# Patient Record
Sex: MALE | Race: WHITE
[De-identification: names, ages, dates, MRNs, and addresses within clinical notes are randomized per-mention and may not be internally consistent; named-entity substitution may affect disease eponyms.]

---

## 2017-09-25 ENCOUNTER — HOSPITAL ENCOUNTER (OUTPATIENT)
Dept: HOSPITAL 53 - M LAB REF | Age: 67
End: 2017-09-25
Attending: INTERNAL MEDICINE
Payer: MEDICARE

## 2017-09-25 DIAGNOSIS — R19.7: Primary | ICD-10-CM

## 2017-10-31 ENCOUNTER — HOSPITAL ENCOUNTER (EMERGENCY)
Dept: HOSPITAL 53 - M ED | Age: 67
Discharge: HOME | End: 2017-10-31
Payer: MEDICARE

## 2017-10-31 VITALS — DIASTOLIC BLOOD PRESSURE: 56 MMHG | SYSTOLIC BLOOD PRESSURE: 105 MMHG

## 2017-10-31 VITALS — BODY MASS INDEX: 37.94 KG/M2 | HEIGHT: 70 IN | WEIGHT: 265 LBS

## 2017-10-31 DIAGNOSIS — E78.4: ICD-10-CM

## 2017-10-31 DIAGNOSIS — I25.2: ICD-10-CM

## 2017-10-31 DIAGNOSIS — T81.4XXA: Primary | ICD-10-CM

## 2017-10-31 DIAGNOSIS — Z86.19: ICD-10-CM

## 2017-10-31 DIAGNOSIS — Z72.0: ICD-10-CM

## 2017-10-31 LAB
ANION GAP SERPL CALC-SCNC: 5 MEQ/L (ref 8–16)
BASOPHILS # BLD AUTO: 0.1 10^3/UL (ref 0–0.2)
BASOPHILS NFR BLD AUTO: 0.8 % (ref 0–1)
BUN SERPL-MCNC: 23 MG/DL (ref 7–18)
CALCIUM SERPL-MCNC: 9.2 MG/DL (ref 8.8–10.2)
CHLORIDE SERPL-SCNC: 106 MEQ/L (ref 98–107)
CO2 SERPL-SCNC: 27 MEQ/L (ref 21–32)
CREAT SERPL-MCNC: 1.7 MG/DL (ref 0.7–1.3)
EOSINOPHIL # BLD AUTO: 0.6 10^3/UL (ref 0–0.5)
EOSINOPHIL NFR BLD AUTO: 7.2 % (ref 0–3)
ERYTHROCYTE [DISTWIDTH] IN BLOOD BY AUTOMATED COUNT: 15 % (ref 11.5–14.5)
GFR SERPL CREATININE-BSD FRML MDRD: 43 ML/MIN/{1.73_M2} (ref 49–?)
GLUCOSE SERPL-MCNC: 148 MG/DL (ref 80–110)
IMM GRANULOCYTES NFR BLD: 0.8 % (ref 0–0)
LYMPHOCYTES # BLD AUTO: 1.7 10^3/UL (ref 1.5–4.5)
LYMPHOCYTES NFR BLD AUTO: 18.4 % (ref 24–44)
MCH RBC QN AUTO: 28.8 PG (ref 27–33)
MCHC RBC AUTO-ENTMCNC: 32.8 G/DL (ref 32–36.5)
MCV RBC AUTO: 87.9 FL (ref 80–96)
MONOCYTES # BLD AUTO: 0.4 10^3/UL (ref 0–0.8)
MONOCYTES NFR BLD AUTO: 4.2 % (ref 0–5)
NEUTROPHILS # BLD AUTO: 6.1 10^3/UL (ref 1.8–7.7)
NEUTROPHILS NFR BLD AUTO: 68.6 % (ref 36–66)
NRBC BLD AUTO-RTO: 0 % (ref 0–0)
PLATELET # BLD AUTO: 219 10^3/UL (ref 150–450)
POTASSIUM SERPL-SCNC: 4.3 MEQ/L (ref 3.5–5.1)
SODIUM SERPL-SCNC: 138 MEQ/L (ref 136–145)
WBC # BLD AUTO: 9 10^3/UL (ref 4–10)

## 2017-10-31 NOTE — REP
Abdominal wall ultrasound:

 

The patient states she had a midline abdominal surgery in September 2017.

 

Scanning of the anterior abdominal wall identifies a few focal fluid collection

approximate 2 cm deep to the skin surface.  The collection measures 4.1 x 0.8 x

1.9 cm and contains internal echoes therefore is a complex fluid collection.

This is compatible with abscess or hematoma or seroma.  . There appears to be a

tract pointing to the skin surface from this fluid collection.

 

Interposed between this collection and the skin surface approximately 0.7 cm deep

to the skin surface.  There is a second hypoechoic collection measuring 2.3 x 1.3

x 1.2 cm, compatible with a second abscess versus hematoma versus phlegmon.

 

 

Signed by

Jackson Carnes MD 10/31/2017 03:27 P

## 2017-11-20 ENCOUNTER — HOSPITAL ENCOUNTER (INPATIENT)
Dept: HOSPITAL 53 - M MSPAV | Age: 67
LOS: 2 days | Discharge: HOME HEALTH | DRG: 372 | End: 2017-11-22
Attending: INTERNAL MEDICINE | Admitting: INTERNAL MEDICINE
Payer: MEDICARE

## 2017-11-20 VITALS — WEIGHT: 269.4 LBS | HEIGHT: 70 IN | BODY MASS INDEX: 38.57 KG/M2

## 2017-11-20 VITALS — DIASTOLIC BLOOD PRESSURE: 63 MMHG | SYSTOLIC BLOOD PRESSURE: 121 MMHG

## 2017-11-20 DIAGNOSIS — F32.9: ICD-10-CM

## 2017-11-20 DIAGNOSIS — Z88.8: ICD-10-CM

## 2017-11-20 DIAGNOSIS — Z88.0: ICD-10-CM

## 2017-11-20 DIAGNOSIS — E11.9: ICD-10-CM

## 2017-11-20 DIAGNOSIS — G20: ICD-10-CM

## 2017-11-20 DIAGNOSIS — Z79.84: ICD-10-CM

## 2017-11-20 DIAGNOSIS — G47.33: ICD-10-CM

## 2017-11-20 DIAGNOSIS — L02.211: ICD-10-CM

## 2017-11-20 DIAGNOSIS — N40.0: ICD-10-CM

## 2017-11-20 DIAGNOSIS — I27.20: ICD-10-CM

## 2017-11-20 DIAGNOSIS — M19.90: ICD-10-CM

## 2017-11-20 DIAGNOSIS — Z79.82: ICD-10-CM

## 2017-11-20 DIAGNOSIS — F17.210: ICD-10-CM

## 2017-11-20 DIAGNOSIS — E78.5: ICD-10-CM

## 2017-11-20 DIAGNOSIS — G47.00: ICD-10-CM

## 2017-11-20 DIAGNOSIS — I25.10: ICD-10-CM

## 2017-11-20 DIAGNOSIS — J44.9: ICD-10-CM

## 2017-11-20 DIAGNOSIS — I50.32: ICD-10-CM

## 2017-11-20 DIAGNOSIS — F41.9: ICD-10-CM

## 2017-11-20 DIAGNOSIS — B95.62: ICD-10-CM

## 2017-11-20 DIAGNOSIS — I35.0: ICD-10-CM

## 2017-11-20 DIAGNOSIS — Z96.643: ICD-10-CM

## 2017-11-20 DIAGNOSIS — J30.9: ICD-10-CM

## 2017-11-20 DIAGNOSIS — K21.9: ICD-10-CM

## 2017-11-20 DIAGNOSIS — Z79.899: ICD-10-CM

## 2017-11-20 DIAGNOSIS — E55.9: ICD-10-CM

## 2017-11-20 DIAGNOSIS — I11.0: ICD-10-CM

## 2017-11-20 DIAGNOSIS — A04.72: Primary | ICD-10-CM

## 2017-11-20 LAB
ALBUMIN SERPL BCG-MCNC: 3.8 GM/DL (ref 3.2–5.2)
ALBUMIN/GLOB SERPL: 1.03 {RATIO} (ref 1–1.93)
ALP SERPL-CCNC: 50 U/L (ref 45–117)
ALT SERPL W P-5'-P-CCNC: 13 U/L (ref 12–78)
ANION GAP SERPL CALC-SCNC: 8 MEQ/L (ref 8–16)
AST SERPL-CCNC: 15 U/L (ref 7–37)
BASOPHILS # BLD AUTO: 0.1 10^3/UL (ref 0–0.2)
BASOPHILS NFR BLD AUTO: 1.1 % (ref 0–1)
BILIRUB SERPL-MCNC: 0.3 MG/DL (ref 0.2–1)
BUN SERPL-MCNC: 25 MG/DL (ref 7–18)
CALCIUM SERPL-MCNC: 9.3 MG/DL (ref 8.8–10.2)
CHLORIDE SERPL-SCNC: 109 MEQ/L (ref 98–107)
CO2 SERPL-SCNC: 23 MEQ/L (ref 21–32)
CREAT SERPL-MCNC: 1.77 MG/DL (ref 0.7–1.3)
EOSINOPHIL # BLD AUTO: 0.7 10^3/UL (ref 0–0.5)
EOSINOPHIL NFR BLD AUTO: 7.5 % (ref 0–3)
ERYTHROCYTE [DISTWIDTH] IN BLOOD BY AUTOMATED COUNT: 14.2 % (ref 11.5–14.5)
ERYTHROCYTE [SEDIMENTATION RATE] IN BLOOD BY WESTERGREN METHOD: 40 MM/HR (ref 0–20)
GFR SERPL CREATININE-BSD FRML MDRD: 41.1 ML/MIN/{1.73_M2} (ref 49–?)
GLUCOSE SERPL-MCNC: 107 MG/DL (ref 80–110)
IMM GRANULOCYTES NFR BLD: 0.7 % (ref 0–0)
INR PPP: 1.06
LYMPHOCYTES # BLD AUTO: 2.1 10^3/UL (ref 1.5–4.5)
LYMPHOCYTES NFR BLD AUTO: 22.6 % (ref 24–44)
MCH RBC QN AUTO: 27.8 PG (ref 27–33)
MCHC RBC AUTO-ENTMCNC: 32 G/DL (ref 32–36.5)
MCV RBC AUTO: 87 FL (ref 80–96)
MONOCYTES # BLD AUTO: 0.5 10^3/UL (ref 0–0.8)
MONOCYTES NFR BLD AUTO: 4.9 % (ref 0–5)
NEUTROPHILS # BLD AUTO: 5.8 10^3/UL (ref 1.8–7.7)
NEUTROPHILS NFR BLD AUTO: 63.2 % (ref 36–66)
NRBC BLD AUTO-RTO: 0 % (ref 0–0)
PLATELET # BLD AUTO: 335 10^3/UL (ref 150–450)
POTASSIUM SERPL-SCNC: 5.1 MEQ/L (ref 3.5–5.1)
PROT SERPL-MCNC: 7.5 GM/DL (ref 6.4–8.2)
SODIUM SERPL-SCNC: 140 MEQ/L (ref 136–145)
WBC # BLD AUTO: 9.1 10^3/UL (ref 4–10)

## 2017-11-20 PROCEDURE — 02HV33Z INSERTION OF INFUSION DEVICE INTO SUPERIOR VENA CAVA, PERCUTANEOUS APPROACH: ICD-10-PCS | Performed by: RADIOLOGY

## 2017-11-20 RX ADMIN — INSULIN LISPRO SCH UNITS: 100 INJECTION, SOLUTION INTRAVENOUS; SUBCUTANEOUS at 18:58

## 2017-11-20 RX ADMIN — TAMSULOSIN HYDROCHLORIDE SCH MG: 0.4 CAPSULE ORAL at 22:12

## 2017-11-20 RX ADMIN — CARBIDOPA AND LEVODOPA SCH TAB: 25; 100 TABLET ORAL at 22:12

## 2017-11-20 RX ADMIN — MONTELUKAST SODIUM SCH MG: 10 TABLET, FILM COATED ORAL at 22:15

## 2017-11-20 RX ADMIN — Medication SCH UNITS: at 22:12

## 2017-11-20 RX ADMIN — PANTOPRAZOLE SODIUM SCH MG: 40 TABLET, DELAYED RELEASE ORAL at 19:04

## 2017-11-20 RX ADMIN — Medication SCH UNITS: at 18:59

## 2017-11-20 RX ADMIN — DEXTROSE MONOHYDRATE SCH MLS/HR: 50 INJECTION, SOLUTION INTRAVENOUS at 18:58

## 2017-11-20 RX ADMIN — SODIUM CHLORIDE SCH ML: 9 INJECTION, SOLUTION INTRAMUSCULAR; INTRAVENOUS; SUBCUTANEOUS at 18:59

## 2017-11-20 RX ADMIN — PROBIOTIC PRODUCT - TAB SCH EA: TAB at 18:57

## 2017-11-20 RX ADMIN — LISINOPRIL SCH MG: 10 TABLET ORAL at 22:15

## 2017-11-20 RX ADMIN — SITAGLIPTIN SCH MG: 50 TABLET, FILM COATED ORAL at 18:57

## 2017-11-20 RX ADMIN — INSULIN LISPRO SCH UNITS: 100 INJECTION, SOLUTION INTRAVENOUS; SUBCUTANEOUS at 20:10

## 2017-11-20 RX ADMIN — ESCITALOPRAM OXALATE SCH MG: 10 TABLET, FILM COATED ORAL at 22:11

## 2017-11-20 RX ADMIN — BUDESONIDE AND FORMOTEROL FUMARATE DIHYDRATE SCH PUFF: 160; 4.5 AEROSOL RESPIRATORY (INHALATION) at 20:46

## 2017-11-20 RX ADMIN — SODIUM CHLORIDE SCH UNITS: 4.5 INJECTION, SOLUTION INTRAVENOUS at 18:46

## 2017-11-20 RX ADMIN — SODIUM CHLORIDE SCH UNITS: 4.5 INJECTION, SOLUTION INTRAVENOUS at 22:17

## 2017-11-20 RX ADMIN — ROSUVASTATIN CALCIUM SCH MG: 10 TABLET, FILM COATED ORAL at 22:11

## 2017-11-20 RX ADMIN — DUTASTERIDE SCH MG: 0.5 CAPSULE, LIQUID FILLED ORAL at 22:16

## 2017-11-20 RX ADMIN — FIDAXOMICIN SCH MG: 200 TABLET, FILM COATED ORAL at 22:16

## 2017-11-20 NOTE — HPEPDOC
General


Date of Admission


2017 at 15:04


Other Providers


Infectious disease:  Dr. UMM Mendoza


Attending Physician:  NICK AUSTIN MD


Chief Complaint


The patient is a 67-year-old male admitted with a reason for visit of Mrsa,Abd 

Wound,Cdiff.


Source:  Patient, Family, RN notes reviewed, Old records


Exam Limitations:  No limitations





History of Present Illness


Mr. Singleton is a 67-year-old  male who presents to [facility] as a direct admission from the infectious disease specialist's office.





Past medical history is significant for:  MRSA of the abdomen, Clostridium 

difficile infection, dyslipidemia, benign prostatic hyperplasia, insomnia, 

degenerative disc disease, small sliding esophageal hiatal hernia, 

gastroesophageal reflux disease, diastolic congestive heart failure, pulmonary 

hypertension, mild aortic stensosis without insufficiency, obstructive sleep 

apnea, diabetes mellitus, chronic obstructive pulmonary disease, Parkinson's 

disease, depression/anxiety, hypertension, history of "silent" myocardial 

infarction.





Patient had presented to the infectious disease specialist's office for 

evaluation of his C. diff infection and abdominal abscess.  Initially presented 

to [facility] with fever on 17.  Transferred to [email or web address] in [place] 

where he received IV antibiotics for 48 hours and an I&D was performed.  

Discharged from [facility] [[email or web address]] on 17.  Continued with Ciprofloxacin and 

Metronidazole for two weeks post-discharge.  Patient admitted to the 

development of a rash with associated pruritis of his arms and abdomen, but 

unsure of its relation to antibiotic use.  Almost a month later on 17 

patient developed diarrhea and was positive for Clostridium difficile.  Treated 

with Vancomycin 250mg orally three times a day for ten days.  Has had a total 

of three cycles of antibiotic therapy with the most recent on 10/30/17.  

Patient then presented to the Emergency Department on 10/31/17 and an abdominal 

ultrasound was done which showed a 4x2 cm complex fluid collection compatible 

with an abscess with a tract pointing to the skin surface.  Would culture was 

performed and was positive for MRSA and Klebsiella oxytoca.  On 17 he was 

given Bactrim DS orally twice day, but subsequently developed an increase in 

his creatinine.  Patient was then switched to Doxycycline 100mg orally twice a 

day which he is still on.  Patient did have an appointment scheduled with 

general surgery for evaluation of his abdominal abscess.  Patient's wife states 

that incision is quite erythematous.  Patient continues to have diarrhea with a 

20 pound weight loss over the last two months.  Admits to shortness of breath 

with exertion and the occasional numbness and tingling in his extremities, but 

no other review of systems was positive during my evaluation at bedside.





Hospitalist service was consulted for further evaluation and management.





Home Medications


Scheduled


 (Trilipix) 135 Mg Cap, 135 MG PO QPM, (Reported)


 (Karen-Bid Probiotic) 1 Tab Tab, 2 EA PO WM


Aspirin (Aspirin 81) 81 Mg Tab, 81 MG PO QPM, (Reported)


Budesonide/Formoterol (Symbicort 160-4.5 Mcg/Act) 60 Puff/Inhaler Aers, 2 PUFF 

INH BID, (Reported)


Bupropion HCl (Wellbutrin Sr) 150 Mg Tabcr, 150 MG PO DAILY, (Reported)


Carbidopa/Levodopa (Carbidopa/Levodopa  mg) 1 Tab Tab, 1 TAB PO TID, (

Reported)


Cetirizine HCl (Cetirizine HCl) 10 Mg Tab, 10 MG PO QPM, (Reported)


Cholecalciferol (Vitamin D3) 2,000 Unit Tab, 2,000 UNIT PO QPM, (Reported)


Dutasteride (Avodart) 0.5 Mg Cap, 0.5 MG PO QPM, (Reported)


Escitalopram Oxalate (Escitalopram Oxalate) 20 Mg Tab, 20 MG PO QPM, (Reported)


Fidaxomicin (Dificid) 200 Mg Tab, 200 MG PO BID


Glimepiride (Glimepiride) 4 Mg Tab, 4 MG PO QPM, (Reported)


Lisinopril (Lisinopril) 10 Mg Tab, 10 MG PO QPM, (Reported)


Montelukast Sodium (Montelukast Sodium) 10 Mg Tab, 10 MG PO QPM, (Reported)


Omeprazole (Omeprazole) 20 Mg Cap, 20 MG PO QPM, (Reported)


Potassium Chloride (Klor-Con M10) 10 Meq Tabcr, 10 MEQ PO QPM, (Reported)


Roflumilast (Daliresp) 500 Mcg Tab, 500 MCG PO QPM, (Reported)


Rosuvastatin Calcium (Crestor) 20 Mg Tab, 20 MG PO QPM, (Reported)


Sitagliptin Phosphate (Januvia) 100 Mg Tab, 100 MG PO QPM, (Reported)


Tamsulosin Hydrochloride (Flomax) 0.4 Mg Cap, 0.4 MG PO QPM, (Reported)


Zolpidem Tartrate (Zolpidem Tartrate) 10 Mg Tab, 10 MG PO QHS, (Reported)





Scheduled PRN


Albuterol Sulfate (Proventil Hfa) 108 Mcg/Act Aer, 2 MCG INH QID PRN for 

SHORTNESS OF BREATH, (Reported)


Fluticasone Propionate (Flovent Hfa) Unknown Strength Aer, Unknown Dose INH BID 

PRN for SHORTNESS OF BREATH, (Reported)


Levalbuterol Hydrochloride (Xopenex Concentrate) 1.25 Mg/0.5 Ml Neb, 1.25 MG 

INH Q4H PRN for SHORTNESS OF BREATH, (Reported)


Lidocaine (Lidoderm) 5 % Dis, 1 PATCH TD DAILY PRN for BACK PAIN, (Reported)


Prednisone (Prednisone) 10 Mg Tab, 10 MG PO DAILY PRN for ASTHMA, (Reported)


Quetiapine Fumerate (Seroquel) 50 Mg Tab, 50 MG PO BID PRN for ANXIETY, (

Reported)





Allergies


Coded Allergies:  


     Metformin (Unverified  Allergy, Unknown, 6/15/16)


     Niacin (Unverified  Allergy, Unknown, 6/15/16)


     Penicillins (Verified  Allergy, Unknown, 13)


     Penicillins Cross Reactors (Verified  Allergy, Unknown, 13)


     Insulin Degludec (Unverified  Adverse Reaction, Intermediate, EXTREMELY 

LOW BLOOD SUGAR- JUST TRULICITY, 17)





Past Medical History


Medical History


1. MRSA of the abdomen with abscess


2. Clostridium difficile infection


3. Dyslipidemia


4. Benign prostatic hyperplasia


5. Insomnia


6. Degenerative disc disease


7. Small sliding esophageal hiatal hernia


8. Gastroesophageal reflux disease


9. Diastolic congestive heart failure


10. Pulmonary hypertension


11. Mild aortic stensosis without insufficiency


12. Obstructive sleep apnea


13. Diabetes mellitus


14. Chronic obstructive pulmonary disease


15. Parkinson's disease


16. Depression/anxiety


17. Hypertension


18. History of "silent" myocardial infarction.


Surgical History


1. Colonoscopy


2. Palate lesion removal


3. EGD


4. Bilateral hip replacements


5. Cardiac stress test x2


6. Incision and drainage of abdominal abscess





Family History


Father:  , 86, lung disease


Mother:  , 68, myocardial infarction


Brother:  , 62, myocardial infarction


Sister:  Alive, 77, Alzheimer's disease


Half-sister:  Alive, 81, renal and liver cancer


Son:  , 31, suicide





Social History


Lives independently with wife at home.  Retired  and fire 

.  One adult son living, one son who is .  Admits 

to five grandchildren.  Has a dog and two cats in the home.  Smokes 

approximately 1/2-1 PPD for the last 50 years.  Denies alcohol or illicit drug 

use.





Review of Symptoms


Constitutional:  Reports: Weight Loss (20 pounds over two months), 


   Denies: Chills, Fever, Night Sweats, Weakness, Fatigue, Lethargy


Eyes:  Denies: Pain, Vision change, Conjunctivae inflammation


ENT:  Denies: Head Aches, Dysphagia, Sinus Congestion, Post Nasal Drip, Sore 

Throat, Epistaxis


Skin:  Reports: Lesions, Dry, Breakdown, 


   Denies: Rash, Jaundice, Bruising, Itching


Pulmonary:  Denies: Dyspnea, Cough


Cardiovascular:  Denies: Chest Pain, Palpitations, Orthopnea, Paroxysmal Noc. 

Dyspnea, Edema, Lt Headedness


Gastrointestinal:  Reports: Diarrhea, 


   Denies: Nausea, Vomiting, Abdominal Pain, Constipation, Melena, Hematochezia


Genitourinary:  Denies: Dysuria, Frequency, Incontinence, Hematuria, Retention


Hematologic:  Denies: Bruising, Bleeding Excessively, Petecchia, Purpura, 

Enlarged Lymph Nodes


Musculoskeletal:  Denies: Neck Pain, Back Pain, Joint Pain


Neurological:  Denies: Weakness, Numbness





Physical Examination


General Exam:  Positive: Alert, Cooperative, No Acute Distress


Eye Exam:  Positive: PERRLA, Conjunctiva & lids normal, EOMI, 


   Negative: Sclera icteric, Ptosis


ENT Exam:  Positive: Atraumatic, Mucous membr. moist/pink, Pharynx Normal, 

Tongue Midline, Nares Patent, 


   Negative: Pharyngeal Edema


Neck Exam:  Positive: Supple, +2 carotid pulse wo bruit, 


   Negative: JVD, thyromegaly, Lymphadenopathy


Chest Exam:  Positive: Clear to auscultation, Normal air movement, 


   Negative: Rales, Rhonchi, Wheezing


Heart Exam:  Positive: Rate Normal, Regular Rhythm, Normal S1, Normal S2, 


   Negative: Gallops, Murmurs, Rubs


Abdomen Exam:  Positive: BS Hypoactive, Other (Protuberant, distended, tympanic 

throughout, healed midline abdominal incision extending below umbilicus, open 

and erythematous abodminal wound with apparent sinus tract)


Extremity Exam:  Positive: Normal pulses, Other (Small, discrete hyperkeratotic 

lesions noted on upper extremities bilaterally), 


   Negative: Clubbing, Cyanosis, Edema


Skin Exam:  Positive: Lesion (As noted above), 


   Negative: Rash


Neuro Exam:  Positive: Normal Speech, Cranial Nerves 3-12 NL


Other physical findings


PICC line insertion





CT abdomen and pelvis


IMPRESSION:


Pending





Vital Signs





Vital Signs








  Date Time  Temp Pulse Resp B/P (MAP) Pulse Ox O2 Delivery O2 Flow Rate FiO2


 


17 14:55 97.8 82 18 121/63 (82) 95 Room Air  








Height (in):  70


Weight (kg):  121.6


BMI (kg):  38.5





Laboratory Data


Labs 24H


Laboratory Tests 2


17 15:23: 


Immature Granulocyte % (Auto) 0.7H, White Blood Count 9.1, Red Blood Count 4.24L

, Hemoglobin 11.8L, Hematocrit 36.9L, Mean Corpuscular Volume 87.0, Mean 

Corpuscular Hemoglobin 27.8, Mean Corpuscular Hemoglobin Concent 32.0, Red Cell 

Distribution Width 14.2, Platelet Count 335, Neutrophils (%) (Auto) 63.2, 

Lymphocytes (%) (Auto) 22.6L, Monocytes (%) (Auto) 4.9, Eosinophils (%) (Auto) 

7.5H, Basophils (%) (Auto) 1.1H, Neutrophils # (Auto) 5.8, Lymphocytes # (Auto) 

2.1, Monocytes # (Auto) 0.5, Eosinophils # (Auto) 0.7H, Basophils # (Auto) 0.1, 

Immature Granulocyte # (Auto) 0.1H, Nucleated Red Blood Cells % (auto) 0.0, 

Erythrocyte Sedimentation Rate 40H, Prothrombin Time 13.9, Prothromb Time 

International Ratio 1.06, Anion Gap 8, Glomerular Filtration Rate 41.1L, Blood 

Urea Nitrogen 25H, Creatinine 1.77H, Sodium Level 140, Potassium Level 5.1, 

Chloride Level 109H, Carbon Dioxide Level 23, Calcium Level 9.3, Aspartate 

Amino Transf (AST/SGOT) 15, Alanine Aminotransferase (ALT/SGPT) 13, Alkaline 

Phosphatase 50, Total Bilirubin 0.3, Total Protein 7.5, Albumin 3.8, C-Reactive 

Protein, Quantitative 1.34H, Albumin/Globulin Ratio 1.03


17 16:51: Bedside Glucose (Misc Panel) 128H


CBC/BMP


Laboratory Tests


17 15:23








Red Blood Count 4.24 L, Mean Corpuscular Volume 87.0, Mean Corpuscular 

Hemoglobin 27.8, Mean Corpuscular Hemoglobin Concent 32.0, Red Cell 

Distribution Width 14.2, Neutrophils (%) (Auto) 63.2, Lymphocytes (%) (Auto) 

22.6 L, Monocytes (%) (Auto) 4.9, Eosinophils (%) (Auto) 7.5 H, Basophils (%) (

Auto) 1.1 H, Neutrophils # (Auto) 5.8, Lymphocytes # (Auto) 2.1, Monocytes # (

Auto) 0.5, Eosinophils # (Auto) 0.7 H, Basophils # (Auto) 0.1, Calcium Level 9.3

, Aspartate Amino Transf (AST/SGOT) 15, Alanine Aminotransferase (ALT/SGPT) 13, 

Alkaline Phosphatase 50, Total Bilirubin 0.3, Total Protein 7.5, Albumin 3.8





Plan / VTE


VTE Prophylaxis Ordered?:  Yes (Heparin 5,000 units SC every 8 hours)





Plan


Plan


Clostridium difficile


- Start Fidaxomycin


- Start Bacid


- Consult infectious disease


MRSA of abdominal abscess


- Start IV Vancomycin


- Start IVF with NS @125mLs/hr


- Percocet for pain, as needed


- Consult general surgery


Hypertension


- Continue with Lisinopril


COPD


- Continue Proventil, Symbicort, Avodart, Prednisone


Allergies


- Continue with Zyrtec, Singulair


Dyslipidemia


- Continue with Crestor


Diabetes mellitus


- Continue with Januvia


- Provided SSI, fingersticks before meals and at bedtime, hypoglycemic protocol


Depression


- Continue with Wellbutrin and Lexapro


Insomnia


- Continue Ambien, Seroquel


BPH


- Continue Flomax


GERD


- Protonix


Iron deficiency


- Ferrous sulfate


CAD


- ASA


Osteoarthritis


- Continue with Lidocaine patch


Vitamin D deficiency


- Continue with supplementation


Disposition


Admit:  Medical-surgical unit


Anticipated hospitalization:  2 nights


Attending;  Dr. Austin


IVF:  Initiate (NS @125mLs/hr)


Diet:  Continue Current


Activity:  Continue Current


Medications:  Change to IV, Start Antibiotics


Diagnostics:  Check Labs, Repeat Labs in AM, Obtain Cultures, CT (Guided 

abscess drain)


Anticipated Discharge:  Home





GME ATTESTATION


GME ATTESTATION


My faculty preceptor for this patient encounter was physically present during 

the encounter and was fully available. All aspects of the patient interview, 

examination, medical decision making process, and medical care plan development 

were reviewed and approved by the faculty preceptor. The faculty preceptor is 

aware and concurs with the plan as stated in the body of this note and will 

attest to such by his/her cosignature.





ATTENDING NOTE


I, Nick Austin, have both independently examined this patient as well as 

reviewed the documentation.  I have discussed in detail with the resident the 

findings and plan of treatment as documented in the residents documentation. I 

will continue to follow the patient and offer further guidance to the patients 

care as necessary during this hospital stay.











JOSÉ KRAUS DO 2017 21:38


NICK AUSTIN MD 2017 16:02

## 2017-11-20 NOTE — REP
RIGHT PICC LINE:

 

The procedure was performed by JOAN Romero under the direct

supervision of Dr. Tinajero.

 

The procedure along with its risks, benefits, and complications were discussed

with the patient prior to the examination. Informed consent was obtained both

verbally and written.

 

The patient was identified in the interventional suite and placed in a supine

position. The right arm was prepped and draped in the usual sterile fashion. A

procedural time out was performed to ensure that the correct, site and procedure

were being performed. Under ultrasound guidance the right basilic vein was

punctured. A thin guidewire was introduced and the needle was removed and local

infiltrative anesthesia was achieved using 2% Lidocaine. A break-away sheath was

placed over the wire. Under fluoroscopic observation via the break away sheath a

44 cm long 4.5 Estonian single lumen PICC line was placed with its tip at the

junction of the distal superior vena cava. The catheter was flushed with

heparinized saline and affixed to the patients skin. The patient tolerated the

procedure well and had no immediate complications.

 

IMPRESSION:

 

Uncomplicated placement of right upper extremity single lumen PICC line.

 

Fluoroscopy time 0.2 minutes were utilized for the this procedure.

 

 

Reviewed by

JOAN Maciel 11/21/2017 05:28 PEdited and Signed by

Jackson Tinajero MD 11/22/2017 01:49 P

## 2017-11-20 NOTE — PHACANCOPD
PHARMACY VANCOMYCIN DOSING


Pt Demographics


Demographics


Patient Age:67 , Weight:121.600  , Gender: male


Adjusted Body Weight


Date: 11/20/17, Adjusted Body Weight: [92.44] Kg





Events Past 24 Hours


Events Past 24 Hours:  YES: Other (MRSA positive abdominal wound culture)





Vancomycin


Vancomycin indication:  abd abscess


Vancomycin Target Ranges:  15-20 mcg/ml


Vancomycin Load Y/N:  No


Load Dose Date Time


Vancomycin Load Dose:         Date:         Time:


Vancomycin Dose


Date: 11/20/17. Current Vancomycin Dose: [1g IV Q12H]


Intermittent Dosing?:  No





Labs


Labs











Item Value  Date Time


 


White Blood Count 9.1 10^3/uL 11/20/17 1523


 


Erythrocyte Sedimentation Rate 40 mm/hr H 11/20/17 1523


 


Creatinine 1.77 MG/DL H 11/20/17 1523


 


Blood Urea Nitrogen 25 MG/DL H 11/20/17 1523


 


C-Reactive Protein, Quantitative 1.34 MG/DL H 11/20/17 1523








Creatinine Clearance


Date:11/20/17. Estimated Creatinine Clearance: [~42ml/min].


Pending Labs


Vancomycin trough level scheduled 11/22/17 @0500





Assessment and Plan


Maintaining Current Dose?:  Yes


Reason for dose change:  No Dose Change





Pharmacist Note


Pharmacist Note


Date: 11/20/17. Pharmacist note: Day #1 vancomycin tx initiated at 1g IV Q12H 

for the treatment of an abdominal abscess - aiming for a goal trough of 15-20mcg

/ml. The patient recently had an abdominal wound culture that was positive for 

MRSA from 10/31/17 with an PERI=1. No hx of vanco use here at [facility]. The patient 

is also currently being treated for cdiff colitis with Dificid. Patient is 

currently afebrile, WBC is WNL, but ESR and CRP are elevated. A vancomycin 

trough has been scheduled for 11/22/17@0500, prior to the 4th dose. We will 

continue to monitor and make dose adjustments if needed.











SALIMA VIRGEN PHARMACY Nov 20, 2017 21:04

## 2017-11-21 VITALS — SYSTOLIC BLOOD PRESSURE: 118 MMHG | DIASTOLIC BLOOD PRESSURE: 63 MMHG

## 2017-11-21 VITALS — DIASTOLIC BLOOD PRESSURE: 63 MMHG | SYSTOLIC BLOOD PRESSURE: 118 MMHG

## 2017-11-21 VITALS — DIASTOLIC BLOOD PRESSURE: 59 MMHG | SYSTOLIC BLOOD PRESSURE: 111 MMHG

## 2017-11-21 VITALS — OXYGEN SATURATION: 96 %

## 2017-11-21 VITALS — OXYGEN SATURATION: 95 %

## 2017-11-21 LAB
ANION GAP SERPL CALC-SCNC: 6 MEQ/L (ref 8–16)
BASOPHILS # BLD AUTO: 0.1 10^3/UL (ref 0–0.2)
BASOPHILS NFR BLD AUTO: 1.1 % (ref 0–1)
BUN SERPL-MCNC: 22 MG/DL (ref 7–18)
CALCIUM SERPL-MCNC: 8.8 MG/DL (ref 8.8–10.2)
CHLORIDE SERPL-SCNC: 108 MEQ/L (ref 98–107)
CO2 SERPL-SCNC: 26 MEQ/L (ref 21–32)
CREAT SERPL-MCNC: 1.8 MG/DL (ref 0.7–1.3)
EOSINOPHIL # BLD AUTO: 0.6 10^3/UL (ref 0–0.5)
EOSINOPHIL NFR BLD AUTO: 7.5 % (ref 0–3)
ERYTHROCYTE [DISTWIDTH] IN BLOOD BY AUTOMATED COUNT: 14.2 % (ref 11.5–14.5)
GFR SERPL CREATININE-BSD FRML MDRD: 40.3 ML/MIN/{1.73_M2} (ref 49–?)
GLUCOSE SERPL-MCNC: 59 MG/DL (ref 80–110)
IMM GRANULOCYTES NFR BLD: 0.7 % (ref 0–0)
LYMPHOCYTES # BLD AUTO: 2.2 10^3/UL (ref 1.5–4.5)
LYMPHOCYTES NFR BLD AUTO: 26.3 % (ref 24–44)
MCH RBC QN AUTO: 27.8 PG (ref 27–33)
MCHC RBC AUTO-ENTMCNC: 31.4 G/DL (ref 32–36.5)
MCV RBC AUTO: 88.4 FL (ref 80–96)
MONOCYTES # BLD AUTO: 0.4 10^3/UL (ref 0–0.8)
MONOCYTES NFR BLD AUTO: 5.3 % (ref 0–5)
NEUTROPHILS # BLD AUTO: 4.8 10^3/UL (ref 1.8–7.7)
NEUTROPHILS NFR BLD AUTO: 59.1 % (ref 36–66)
NRBC BLD AUTO-RTO: 0 % (ref 0–0)
PLATELET # BLD AUTO: 283 10^3/UL (ref 150–450)
POTASSIUM SERPL-SCNC: 4.7 MEQ/L (ref 3.5–5.1)
SODIUM SERPL-SCNC: 140 MEQ/L (ref 136–145)
WBC # BLD AUTO: 8.2 10^3/UL (ref 4–10)

## 2017-11-21 PROCEDURE — 0W9F3ZZ DRAINAGE OF ABDOMINAL WALL, PERCUTANEOUS APPROACH: ICD-10-PCS | Performed by: RADIOLOGY

## 2017-11-21 RX ADMIN — SODIUM CHLORIDE SCH ML: 9 INJECTION, SOLUTION INTRAMUSCULAR; INTRAVENOUS; SUBCUTANEOUS at 18:47

## 2017-11-21 RX ADMIN — TAMSULOSIN HYDROCHLORIDE SCH MG: 0.4 CAPSULE ORAL at 21:16

## 2017-11-21 RX ADMIN — ROSUVASTATIN CALCIUM SCH MG: 10 TABLET, FILM COATED ORAL at 21:17

## 2017-11-21 RX ADMIN — ESCITALOPRAM OXALATE SCH MG: 10 TABLET, FILM COATED ORAL at 21:17

## 2017-11-21 RX ADMIN — INSULIN LISPRO SCH UNITS: 100 INJECTION, SOLUTION INTRAVENOUS; SUBCUTANEOUS at 13:49

## 2017-11-21 RX ADMIN — SODIUM CHLORIDE SCH UNITS: 4.5 INJECTION, SOLUTION INTRAVENOUS at 21:18

## 2017-11-21 RX ADMIN — Medication SCH UNITS: at 21:16

## 2017-11-21 RX ADMIN — PROBIOTIC PRODUCT - TAB SCH EA: TAB at 08:54

## 2017-11-21 RX ADMIN — BUDESONIDE AND FORMOTEROL FUMARATE DIHYDRATE SCH PUFF: 160; 4.5 AEROSOL RESPIRATORY (INHALATION) at 22:38

## 2017-11-21 RX ADMIN — INSULIN LISPRO SCH UNITS: 100 INJECTION, SOLUTION INTRAVENOUS; SUBCUTANEOUS at 07:30

## 2017-11-21 RX ADMIN — FENOFIBRATE SCH MG: 145 TABLET ORAL at 09:16

## 2017-11-21 RX ADMIN — LISINOPRIL SCH MG: 10 TABLET ORAL at 21:16

## 2017-11-21 RX ADMIN — INSULIN LISPRO SCH UNITS: 100 INJECTION, SOLUTION INTRAVENOUS; SUBCUTANEOUS at 21:00

## 2017-11-21 RX ADMIN — SODIUM CHLORIDE SCH UNITS: 4.5 INJECTION, SOLUTION INTRAVENOUS at 05:12

## 2017-11-21 RX ADMIN — CARBIDOPA AND LEVODOPA SCH TAB: 25; 100 TABLET ORAL at 15:57

## 2017-11-21 RX ADMIN — DUTASTERIDE SCH MG: 0.5 CAPSULE, LIQUID FILLED ORAL at 21:17

## 2017-11-21 RX ADMIN — DEXTROSE MONOHYDRATE SCH MLS/HR: 50 INJECTION, SOLUTION INTRAVENOUS at 05:58

## 2017-11-21 RX ADMIN — SODIUM CHLORIDE SCH UNITS: 4.5 INJECTION, SOLUTION INTRAVENOUS at 13:50

## 2017-11-21 RX ADMIN — Medication SCH UNITS: at 18:47

## 2017-11-21 RX ADMIN — ASPIRIN SCH MG: 81 TABLET ORAL at 09:16

## 2017-11-21 RX ADMIN — CARBIDOPA AND LEVODOPA SCH TAB: 25; 100 TABLET ORAL at 21:17

## 2017-11-21 RX ADMIN — PROBIOTIC PRODUCT - TAB SCH EA: TAB at 13:50

## 2017-11-21 RX ADMIN — PANTOPRAZOLE SODIUM SCH MG: 40 TABLET, DELAYED RELEASE ORAL at 09:16

## 2017-11-21 RX ADMIN — FERROUS SULFATE TAB 325 MG (65 MG ELEMENTAL FE) SCH MG: 325 (65 FE) TAB at 09:16

## 2017-11-21 RX ADMIN — SODIUM CHLORIDE SCH ML: 9 INJECTION, SOLUTION INTRAMUSCULAR; INTRAVENOUS; SUBCUTANEOUS at 05:12

## 2017-11-21 RX ADMIN — BUPROPION HYDROCHLORIDE SCH MG: 150 TABLET, FILM COATED, EXTENDED RELEASE ORAL at 09:15

## 2017-11-21 RX ADMIN — INSULIN LISPRO SCH UNITS: 100 INJECTION, SOLUTION INTRAVENOUS; SUBCUTANEOUS at 17:35

## 2017-11-21 RX ADMIN — PROBIOTIC PRODUCT - TAB SCH EA: TAB at 17:35

## 2017-11-21 RX ADMIN — BUDESONIDE AND FORMOTEROL FUMARATE DIHYDRATE SCH PUFF: 160; 4.5 AEROSOL RESPIRATORY (INHALATION) at 07:25

## 2017-11-21 RX ADMIN — FIDAXOMICIN SCH MG: 200 TABLET, FILM COATED ORAL at 09:15

## 2017-11-21 RX ADMIN — Medication SCH UNITS: at 05:12

## 2017-11-21 RX ADMIN — MONTELUKAST SODIUM SCH MG: 10 TABLET, FILM COATED ORAL at 21:16

## 2017-11-21 RX ADMIN — SITAGLIPTIN SCH MG: 50 TABLET, FILM COATED ORAL at 17:34

## 2017-11-21 RX ADMIN — SODIUM CHLORIDE SCH UNITS: 4.5 INJECTION, SOLUTION INTRAVENOUS at 21:19

## 2017-11-21 RX ADMIN — FIDAXOMICIN SCH MG: 200 TABLET, FILM COATED ORAL at 21:17

## 2017-11-21 RX ADMIN — DEXTROSE MONOHYDRATE SCH MLS/HR: 50 INJECTION, SOLUTION INTRAVENOUS at 17:36

## 2017-11-21 RX ADMIN — CARBIDOPA AND LEVODOPA SCH TAB: 25; 100 TABLET ORAL at 09:16

## 2017-11-21 NOTE — REP
ULTRASOUND GUIDED ABDOMINAL ABSCESS DRAINAGE:

 

The procedure was performed by JOAN Zhou under the direct

supervision of Dr. Tinajero.

 

The procedure along with its risks, benefits, and complications were discussed

with the patient prior to the examination. Informed consent was obtained both

verbally and written.

 

The patient was identified in the ultrasound suite and placed in a supine

position. Ultrasound guidance was used to find an appropriate site for needle

entry. This area was marked, prepped and draped in the usual sterile fashion. A

procedural time-out was performed to ensure that the correct patient, site and

procedure were being performed. Local infiltrate of the anesthesia was achieved

using 1% Xylocaine. A small skin nick was made. An attempt was made to place a

#10-Egyptian skater catheter. This attempt was unsuccessful as I was not able to

get the catheter to advance through the scar tissue and into the abscess.

 

An #8-Egyptian centesis catheter was advanced into the area of interest and 5 mL of

purulent yellow fluid was aspirated. The fluid was placed in a container and sent

to the lab for further evaluation. Results pending. A soft dressing was applied

to the needle entry site.

The patient tolerated the procedure well and had no immediate complications.

 

IMPRESSION:

 

Ultrasound guided abdominal abscess drainage yielding 5 mL of purulent yellow

fluid that was sent to the lab for further evaluation. Results pending.

 

 

Reviewed by

JOAN Maciel 11/21/2017 06:02 PEdited and Signed by

Jackson Tinajero MD 11/22/2017 01:49 P

## 2017-11-21 NOTE — IPNPDOC
Text Note


Date of Service


The patient was seen on 11/21/17.





NOTE


Infectious Disease Progress Note





Subjective:


Mr. Singleton is feeling well today, he did have his aspiration performed by the 

surgeon earlier as well as some debridement of the abdominal wound, and the 

patient reports that they were unable to place a drain because of all the scar 

tissue, however they were able to draw off an aspirate for culture.  Otherwise, 

he is not complaining of much discomfort from the procedure.  He denies any 

fevers, chills, night sweats, respiratory issues, chest discomfort, and all 

other review of systems is negative.





Objective:


General: Awake, alert, oriented 3. He is in no acute distress. He is sitting 

upright on the edge of the bed.


HEENT: Head normocephalic, atraumatic, sclera are nonicteric. Hearing is 

grossly intact to conversation.


Respiratory: Clear to auscultation bilaterally with no wheezes, rales, or 

rhonchi.


Cardiovascular: Regular rate and rhythm, with no rubs, gallops, or murmur.


Abdomen: Protuberant belly, he does have a midline abdominal incision that was 

debrided earlier today, there is no surrounding erythema, no discharge at this 

time. He is not tender in the area.


Extremities: 2+ pulses in the radial and dorsalis pedis bilaterally. No 

evidence of clubbing or cyanosis.





Assessment/Plan:


1. Clostridium difficile colitis. He has failed 3 rounds of oral vancomycin 10 

days each (for a total of 30 days) and therefore he has been started on Dificid 

200 mg by mouth twice a day.  I have already sent the prescription to [facility] 

[facility] on [facility] {phone number is [phone]}, I called and spoke 

with the pharmacist on 11/21/17 @ 0735 who said that he does not require prior 

authorization, however they do not stock this drug and he will need to obtain 

the drug through their specialty pharmacy in [place], she has already sent the 

request/prescription to them and the patient should be receiving a phone call 

within the next 24 hours from the pharmacy and they will asked the patient 

whether he desires to have medication shipped to the local pharmacy or sensory 

to his home, therefore the patient should be expecting this phone call.  

Supposedly specialty pharmacy is quite good and usually is able to overnight 

drugs to their destination.





2. MRSA Abdominal abscess. I would recommend keeping him on IV vancomycin until 

more definitive surgical treatment is able to be performed regarding his 

abdominal abscess and infected mesh. It appears that surgery would like to 

order a special type of mesh that is less likely to become infected, and 

apparantly they will not be able to get him in to the OR until at least next 

week. He does have a PICC line in place.  He will have home IV vancomycin 1 

gram Q12H through the home health care [facility].  





Plan: From an infectious disease standpoint, he will be appropriate for 

discharge when his home antibiotics are all set up and ready to go.








My preceptor for this patient encounter was physically present in the building 

during the encounter and was fully available. As needed, all aspects of the 

patient interview, examination, medical decision making process, and medical 

care plan development were reviewed and approved by the preceptor. Preceptor is 

aware and concurs with the plan as stated in the body of this note and will 

attest to such by his/her cosignature.





VS,Paramjit, I+O


VS, Paramjit, I+O


Laboratory Tests


11/21/17 05:11








Red Blood Count 3.78 L, Mean Corpuscular Volume 88.4, Mean Corpuscular 

Hemoglobin 27.8, Mean Corpuscular Hemoglobin Concent 31.4 L, Red Cell 

Distribution Width 14.2, Neutrophils (%) (Auto) 59.1, Lymphocytes (%) (Auto) 

26.3, Monocytes (%) (Auto) 5.3 H, Eosinophils (%) (Auto) 7.5 H, Basophils (%) (

Auto) 1.1 H, Neutrophils # (Auto) 4.8, Lymphocytes # (Auto) 2.2, Monocytes # (

Auto) 0.4, Eosinophils # (Auto) 0.6 H, Basophils # (Auto) 0.1, Calcium Level 8.8








Vital Signs








  Date Time  Temp Pulse Resp B/P (MAP) Pulse Ox O2 Delivery O2 Flow Rate FiO2


 


11/21/17 14:00 99.5 63 20 111/59 (76) 93 Room Air  














I&O- Last 24 Hours up to 6 AM 


 


 11/22/17





 06:00


 


Intake Total 1330 ml


 


Output Total 400 ml


 


Balance 930 ml

















CARLOS ALLEN DO Nov 21, 2017 18:15

## 2017-11-21 NOTE — IPN
DATE:  11/21/2017

 

Mr. Singleton is feeling well today.  He has no complaints of pain, chest pain, no

shortness of breath.  He has tolerated a diet.

 

Most recent temperature in the computer is 97.8, pulse 55, respiratory rate 15,

blood pressure 128/78, pulse oximetry 95% on room air.  Intake and output are

notable for no bowel movements being recorded.  Body mass index (BMI) is 39.  He

is awake, appropriately interactive, pleasantly conversant.  Breathing is

symmetrical.  I-to-E ratio is 1:3.  Heart is distant sounding.  Normal S1 and S2.

Abdomen is soft.  There is an area of erythema under his subumbilical bandage

without any obvious drainage or scent.  It is grossly nontender.

 

White cell count 8.2, hemoglobin 10.5, and platelets of 283.  BUN 22, creatinine

1.8.

 

CT scan of that area has not been read.

 

My assessment is as follows:  This is a 67-year-old patient with abdominal wall

abscess, previously with Methicillin-resistant Staphylococcus aureus (MRSA).

 

My plan will be as follows:

1.  Infectious disease.  Patient is being treated for MRSA abdominal wall

abscess.  Will go for drainage today.  Patient also has C. Difficile, is on

Dificid, which will be continued.  Patient is being followed by Dr. Mendoza from

infectious disease and will be seen by Dr. Gosselin from general surgery.

 

2.  The patient has congestive heart failure (CHF) with diastolic dysfunction,

which is compensated.

 

3.  The patient has hypertension which is reasonably well controlled for the

current settings.

 

4.  The patient has diabetes.

 

5  The patient has known coronary artery disease.

 

6.  Deep vein thrombosis (DVT) prophylaxis with subcutaneous heparin.

## 2017-11-21 NOTE — REP
Clinical:  Abscess

 

Comparison:  MRI dated 12/07/2016.

 

Findings:

Evidence for an anterior abdominal wall surgery with small complex fluid

collection in the midline subcutaneous tissues measuring approximately 3.7 x 3.3

x 2.3 cm containing suspect a septation and small amount of gas. Small associated

hernia containing nonobstructed portions of the bowel (large bowel at images 55 -

64; small bowel and images 67 - 74).

 

Liver, spleen, pancreas, bilateral adrenal glands are normal.  Cholelithiasis

noted.  Kidneys demonstrate bilateral hypodensities compatible with cysts and

similar to findings on prior MRI.  5 mm nonobstructing left renal calculus

identified.  No perinephric stranding or hydroureteronephrosis.  The enteric

system is without obstruction or acute inflammatory process evidence of prior

sigmoid anastomoses.  Further evaluation of the pelvis is significantly limited

due to beam-hardening artifact from bilateral hip prostheses. No ascites.  No

free intraperitoneal air.  No significant adenopathy.  Musculoskeletal structures

demonstrate age-related degenerative changes.  Lung bases demonstrate minimal

posterior basilar dependent changes.  Visualized portions of the heart and

pericardium are normal.

 

Impression:

1.  Postsurgical changes involving the midline anterior abdominal wall with small

complex fluid collection/abscess may be amendable to percutaneous or incisional

drainage if necessary.  Associated partial hernia of nonobstructed large and

small bowel as described above.

2.  Cholelithiasis.

3.  Bilateral renal hypodensities compatible with cysts as documented by prior

MRI and 5 mm nonobstructing left renal calculus.

 

 

Signed by

Cuong Issa MD 11/21/2017 01:29 P

## 2017-11-22 VITALS — DIASTOLIC BLOOD PRESSURE: 56 MMHG | SYSTOLIC BLOOD PRESSURE: 109 MMHG

## 2017-11-22 LAB
ANION GAP SERPL CALC-SCNC: 7 MEQ/L (ref 8–16)
BASOPHILS # BLD AUTO: 0.1 10^3/UL (ref 0–0.2)
BASOPHILS NFR BLD AUTO: 1.2 % (ref 0–1)
BUN SERPL-MCNC: 25 MG/DL (ref 7–18)
CALCIUM SERPL-MCNC: 8.5 MG/DL (ref 8.8–10.2)
CHLORIDE SERPL-SCNC: 106 MEQ/L (ref 98–107)
CO2 SERPL-SCNC: 27 MEQ/L (ref 21–32)
CREAT SERPL-MCNC: 1.75 MG/DL (ref 0.7–1.3)
EOSINOPHIL # BLD AUTO: 0.4 10^3/UL (ref 0–0.5)
EOSINOPHIL NFR BLD AUTO: 6.9 % (ref 0–3)
ERYTHROCYTE [DISTWIDTH] IN BLOOD BY AUTOMATED COUNT: 14.1 % (ref 11.5–14.5)
GFR SERPL CREATININE-BSD FRML MDRD: 41.6 ML/MIN/{1.73_M2} (ref 49–?)
GLUCOSE SERPL-MCNC: 131 MG/DL (ref 80–110)
IMM GRANULOCYTES NFR BLD: 1 % (ref 0–0)
LYMPHOCYTES # BLD AUTO: 1.8 10^3/UL (ref 1.5–4.5)
LYMPHOCYTES NFR BLD AUTO: 31.1 % (ref 24–44)
MCH RBC QN AUTO: 27.4 PG (ref 27–33)
MCHC RBC AUTO-ENTMCNC: 31.5 G/DL (ref 32–36.5)
MCV RBC AUTO: 87.1 FL (ref 80–96)
MONOCYTES # BLD AUTO: 0.3 10^3/UL (ref 0–0.8)
MONOCYTES NFR BLD AUTO: 5.6 % (ref 0–5)
NEUTROPHILS # BLD AUTO: 3.2 10^3/UL (ref 1.8–7.7)
NEUTROPHILS NFR BLD AUTO: 54.2 % (ref 36–66)
NRBC BLD AUTO-RTO: 0 % (ref 0–0)
PLATELET # BLD AUTO: 225 10^3/UL (ref 150–450)
POTASSIUM SERPL-SCNC: 4.6 MEQ/L (ref 3.5–5.1)
SODIUM SERPL-SCNC: 140 MEQ/L (ref 136–145)
WBC # BLD AUTO: 5.9 10^3/UL (ref 4–10)

## 2017-11-22 RX ADMIN — BUDESONIDE AND FORMOTEROL FUMARATE DIHYDRATE SCH PUFF: 160; 4.5 AEROSOL RESPIRATORY (INHALATION) at 08:00

## 2017-11-22 RX ADMIN — BUPROPION HYDROCHLORIDE SCH MG: 150 TABLET, FILM COATED, EXTENDED RELEASE ORAL at 09:05

## 2017-11-22 RX ADMIN — SODIUM CHLORIDE SCH ML: 9 INJECTION, SOLUTION INTRAMUSCULAR; INTRAVENOUS; SUBCUTANEOUS at 05:19

## 2017-11-22 RX ADMIN — ASPIRIN SCH MG: 81 TABLET ORAL at 09:06

## 2017-11-22 RX ADMIN — SODIUM CHLORIDE SCH UNITS: 4.5 INJECTION, SOLUTION INTRAVENOUS at 05:08

## 2017-11-22 RX ADMIN — Medication SCH UNITS: at 05:19

## 2017-11-22 RX ADMIN — CARBIDOPA AND LEVODOPA SCH TAB: 25; 100 TABLET ORAL at 09:05

## 2017-11-22 RX ADMIN — INSULIN LISPRO SCH UNITS: 100 INJECTION, SOLUTION INTRAVENOUS; SUBCUTANEOUS at 09:05

## 2017-11-22 RX ADMIN — DEXTROSE MONOHYDRATE SCH MLS/HR: 50 INJECTION, SOLUTION INTRAVENOUS at 05:20

## 2017-11-22 RX ADMIN — FERROUS SULFATE TAB 325 MG (65 MG ELEMENTAL FE) SCH MG: 325 (65 FE) TAB at 09:06

## 2017-11-22 RX ADMIN — PANTOPRAZOLE SODIUM SCH MG: 40 TABLET, DELAYED RELEASE ORAL at 09:06

## 2017-11-22 RX ADMIN — FENOFIBRATE SCH MG: 145 TABLET ORAL at 09:06

## 2017-11-22 RX ADMIN — FIDAXOMICIN SCH MG: 200 TABLET, FILM COATED ORAL at 09:05

## 2017-11-22 RX ADMIN — PROBIOTIC PRODUCT - TAB SCH EA: TAB at 09:06

## 2017-11-22 NOTE — PHACANCOPD
PHARMACY VANCOMYCIN DOSING


Pt Demographics


Demographics


Patient Age:67 , Weight:123.400  , Gender: male


Adjusted Body Weight


Date: 11/20/17, Adjusted Body Weight: [92.44] Kg





Vancomycin


Vancomycin indication:  abd abscess


Vancomycin Target Ranges:  15-20 mcg/ml


Vancomycin Load Y/N:  No


Load Dose Date Time


Vancomycin Load Dose:         Date:         Time:


Vancomycin Dose


Date: 11/20/17. Current Vancomycin Dose: [1g IV Q12H]


Intermittent Dosing?:  No





Labs


Labs











Item Value  Date Time


 


White Blood Count 5.9 10^3/uL 11/22/17 0521


 


Creatinine 1.75 MG/DL H 11/22/17 0521


 


Blood Urea Nitrogen 25 MG/DL H 11/22/17 0521


 


Vancomycin Level Trough 16.5 UG/ML 11/22/17 0521











 Vital Signs








Label Value  Date Time


 


Patient Temperature 98.9 degrees F 11/21/17 2200


 


Temperature Source Temporal 11/21/17 2200








Micro





Microbiology


11/21/17 Anaerobic Culture, Received


           Pending


11/21/17 Gram Stain - Final, Resulted


           


11/21/17 Abscess Culture, Resulted


           Pending


Creatinine Clearance


Date:11/20/17. Estimated Creatinine Clearance: [~42ml/min].





Assessment and Plan


Maintaining Current Dose?:  Yes


Reason for dose change:  No Dose Change





Pharmacist Note


Pharmacist Note


Date: 11/20/17. Pharmacist note:Trough of 16.5 is within target range.  Will 

continue current dosing.  Will continue to monitor and make adjustments as 

needed.











ALDO [facility] Nov 22, 2017 06:00

## 2017-11-23 NOTE — DSES
DATE OF ADMISSION:  11/20/2017

DATE OF DISCHARGE: 11/22/2017

 

SPECIALISTS INVOLVED IN CARE:  Dr. Gosselin and Dr. Mendoza.

 

COMPLICATIONS:  No complications during stay.

 

PROCEDURES PERFORMED DURING STAY:

Needle aspiration of abdominal wall abscess, cultures of which are pending.

 

DISCHARGE DIAGNOSES:

1. Abdominal wall abscess, presumably methicillin-resistant Staphylococcus 
aureus

(MRSA).

2. Clostridium (C.) difficile colitis, recurrent.

3. Congestive heart failure with diastolic dysfunction, compensated.

4. Hypertension.

5. Diabetes.

6. Coronary artery disease.

7. Degenerative disc disease.

8. Pulmonary hypertension.

9. Mild aortic stenosis without insufficiency.

10. Obstructive sleep apnea (CHRISTIE).

11. Parkinson's.

 

SUMMARY OF PRESENTATION:

This is a 67-year-old who was admitted as a direct admission from Dr. Mendoza's

office with known MRSA infection of the abdominal wall, history of C. difficile.

Was treated with an incision and drainage (I and D) of his abdominal wall in

August at [facility]. Was treated with

antibiotics over three different courses. Has had C. difficile colitis, went to

Dr. Mendoza's office and felt to need admission and was admitted. He had an abscess

drain. Had a peripherally inserted central catheter (PICC) line placed. Was

started on Dificid and vancomycin. We seen in consultation by Dr. Mendoza, and the

case was discussed with Dr. Gosselin. On the day of discharge he is feeling 
well.

The plan is to followup with Dr. Gosselin for mesh replacement and debridement,

and to have ongoing antibiotic therapy as managed by Dr. Mendoza.

 

On the day of discharge, he is feeling well. He has no complaints of pain, chest

pain, shortness of breath. Is tolerating diet.

 

PHYSICAL EXAMINATION:

VITAL SIGNS: Temperature 97.4, pulse 59, respiratory rate 17, blood pressure

109/56, 93% on room air.

GENERAL: Awake, appropriately interactive, pleasantly conversant.

LUNGS: Breathing is symmetrical and rested.

HEART: Regular rate and rhythm.

ABDOMEN: Soft, doughy, nontender. He has an Optifoam covering the subumbilical

site of abscess drainage. There is some maceration of the skin from previous

wet-to-dry dressing changes.

 

LABORATORY DATA:

White blood cell count 5.9, hemoglobin 10.4, platelets of 225. BUN 25, 
creatinine

1.75.

 

DISCHARGE INSTRUCTIONS:

Followup with Dr. Mendoza per her instruction; Dr. Gosselin within two weeks. Diet

and activity as tolerated. Continue Optifoam dressing; apply daily and as 
needed.

 

DISCHARGE MEDICATIONS:

- Dificid 200 mg by mouth twice daily for nine days

- probiotic tablets two tablets with meals #90

- albuterol inhaled four times a day

- aspirin 81 mg by mouth every evening

- Symbicort two puffs inhaled twice daily

- Wellbutrin S R 150 mg by mouth daily

- Sinemet one tablet by mouth three times a day

- cetirizine 10 mg by mouth every evening

- vitamin D 2000 units every morning

- Avodart 0.5 mg by mouth every evening

- Nexium 20 mg by mouth every evening

- Flovent inhaled twice daily as needed for shortness of breath

- glimepiride 4 mg by mouth every evening

- Xopenex every four hours as needed for shortness of breath

- Lidoderm one patch transdermally as needed for back pain

- lisinopril 10 mg by mouth every evening

- Singulair 10 mg by mouth every evening

- omeprazole 20 mg by mouth every evening

- potassium chloride 10 mEq by mouth every evening

- prednisone 10 mg by mouth daily as needed for asthma

- Seroquel 50 mg by mouth twice a day as needed for anxiety

- Daliresp 500 mcg by mouth every evening

- Crestor 20 mg by mouth every evening

- Januvia 100 mg by mouth every evening

- Flomax 0.4 mg by mouth every evening

- Trilipix 135 mg by mouth every evening

- Ambien 10 mg by mouth daily at bedtime

 

Discontinue doxycycline, discontinue oral vancomycin.

MTDD

## 2017-11-27 ENCOUNTER — HOSPITAL ENCOUNTER (OUTPATIENT)
Dept: HOSPITAL 53 - M LAB REF | Age: 67
End: 2017-11-27
Attending: INTERNAL MEDICINE
Payer: MEDICARE

## 2017-11-27 DIAGNOSIS — B95.62: Primary | ICD-10-CM

## 2017-11-27 LAB
BASOPHILS # BLD AUTO: 0.1 10^3/UL (ref 0–0.2)
BASOPHILS NFR BLD AUTO: 1.1 % (ref 0–1)
EOSINOPHIL # BLD AUTO: 0.6 10^3/UL (ref 0–0.5)
EOSINOPHIL NFR BLD AUTO: 7.5 % (ref 0–3)
ERYTHROCYTE [DISTWIDTH] IN BLOOD BY AUTOMATED COUNT: 14.6 % (ref 11.5–14.5)
ERYTHROCYTE [SEDIMENTATION RATE] IN BLOOD BY WESTERGREN METHOD: 25 MM/HR (ref 0–20)
IMM GRANULOCYTES NFR BLD: 1.6 % (ref 0–0)
LYMPHOCYTES # BLD AUTO: 2 10^3/UL (ref 1.5–4.5)
LYMPHOCYTES NFR BLD AUTO: 23.1 % (ref 24–44)
MCH RBC QN AUTO: 28.3 PG (ref 27–33)
MCHC RBC AUTO-ENTMCNC: 31.7 G/DL (ref 32–36.5)
MCV RBC AUTO: 89.1 FL (ref 80–96)
MONOCYTES # BLD AUTO: 0.5 10^3/UL (ref 0–0.8)
MONOCYTES NFR BLD AUTO: 5.9 % (ref 0–5)
NEUTROPHILS # BLD AUTO: 5.2 10^3/UL (ref 1.8–7.7)
NEUTROPHILS NFR BLD AUTO: 60.8 % (ref 36–66)
NRBC BLD AUTO-RTO: 0 % (ref 0–0)
PLATELET # BLD AUTO: 249 10^3/UL (ref 150–450)
WBC # BLD AUTO: 8.5 10^3/UL (ref 4–10)

## 2017-12-04 ENCOUNTER — HOSPITAL ENCOUNTER (OUTPATIENT)
Dept: HOSPITAL 53 - M LAB REF | Age: 67
End: 2017-12-04
Attending: INTERNAL MEDICINE
Payer: MEDICARE

## 2017-12-04 DIAGNOSIS — B95.62: Primary | ICD-10-CM

## 2017-12-04 LAB
ANION GAP SERPL CALC-SCNC: 8 MEQ/L (ref 8–16)
BASOPHILS # BLD AUTO: 0.1 10^3/UL (ref 0–0.2)
BASOPHILS NFR BLD AUTO: 1 % (ref 0–1)
BUN SERPL-MCNC: 32 MG/DL (ref 7–18)
CALCIUM SERPL-MCNC: 8.9 MG/DL (ref 8.8–10.2)
CHLORIDE SERPL-SCNC: 106 MEQ/L (ref 98–107)
CO2 SERPL-SCNC: 26 MEQ/L (ref 21–32)
CREAT SERPL-MCNC: 1.81 MG/DL (ref 0.7–1.3)
EOSINOPHIL # BLD AUTO: 0.6 10^3/UL (ref 0–0.5)
EOSINOPHIL NFR BLD AUTO: 8.4 % (ref 0–3)
ERYTHROCYTE [DISTWIDTH] IN BLOOD BY AUTOMATED COUNT: 14.1 % (ref 11.5–14.5)
ERYTHROCYTE [SEDIMENTATION RATE] IN BLOOD BY WESTERGREN METHOD: 49 MM/HR (ref 0–20)
GFR SERPL CREATININE-BSD FRML MDRD: 40 ML/MIN/{1.73_M2} (ref 49–?)
GLUCOSE SERPL-MCNC: 180 MG/DL (ref 80–110)
IMM GRANULOCYTES NFR BLD: 0.6 % (ref 0–0)
LYMPHOCYTES # BLD AUTO: 1.3 10^3/UL (ref 1.5–4.5)
LYMPHOCYTES NFR BLD AUTO: 19.1 % (ref 24–44)
MCH RBC QN AUTO: 27.7 PG (ref 27–33)
MCHC RBC AUTO-ENTMCNC: 31.4 G/DL (ref 32–36.5)
MCV RBC AUTO: 88.3 FL (ref 80–96)
MONOCYTES # BLD AUTO: 0.3 10^3/UL (ref 0–0.8)
MONOCYTES NFR BLD AUTO: 4.3 % (ref 0–5)
NEUTROPHILS # BLD AUTO: 4.5 10^3/UL (ref 1.8–7.7)
NEUTROPHILS NFR BLD AUTO: 66.6 % (ref 36–66)
NRBC BLD AUTO-RTO: 0 % (ref 0–0)
PLATELET # BLD AUTO: 232 10^3/UL (ref 150–450)
POTASSIUM SERPL-SCNC: 4.5 MEQ/L (ref 3.5–5.1)
SODIUM SERPL-SCNC: 140 MEQ/L (ref 136–145)
WBC # BLD AUTO: 6.8 10^3/UL (ref 4–10)

## 2017-12-11 ENCOUNTER — HOSPITAL ENCOUNTER (OUTPATIENT)
Dept: HOSPITAL 53 - M LAB REF | Age: 67
End: 2017-12-11
Attending: INTERNAL MEDICINE
Payer: MEDICARE

## 2017-12-11 DIAGNOSIS — B95.62: ICD-10-CM

## 2017-12-11 DIAGNOSIS — T81.4XXA: Primary | ICD-10-CM

## 2017-12-11 LAB
ANION GAP SERPL CALC-SCNC: 10 MEQ/L (ref 8–16)
BUN SERPL-MCNC: 25 MG/DL (ref 7–18)
CALCIUM SERPL-MCNC: 8.8 MG/DL (ref 8.8–10.2)
CHLORIDE SERPL-SCNC: 107 MEQ/L (ref 98–107)
CO2 SERPL-SCNC: 26 MEQ/L (ref 21–32)
CREAT SERPL-MCNC: 1.64 MG/DL (ref 0.7–1.3)
GFR SERPL CREATININE-BSD FRML MDRD: 44.8 ML/MIN/{1.73_M2} (ref 49–?)
GLUCOSE SERPL-MCNC: 149 MG/DL (ref 80–110)
POTASSIUM SERPL-SCNC: 4.6 MEQ/L (ref 3.5–5.1)
SODIUM SERPL-SCNC: 143 MEQ/L (ref 136–145)

## 2017-12-18 ENCOUNTER — HOSPITAL ENCOUNTER (OUTPATIENT)
Dept: HOSPITAL 53 - M LAB REF | Age: 67
End: 2017-12-18
Attending: INTERNAL MEDICINE
Payer: MEDICARE

## 2017-12-18 DIAGNOSIS — L02.211: Primary | ICD-10-CM

## 2017-12-18 DIAGNOSIS — B95.62: ICD-10-CM

## 2017-12-18 LAB
ANION GAP SERPL CALC-SCNC: 8 MEQ/L (ref 8–16)
BASOPHILS # BLD AUTO: 0.1 10^3/UL (ref 0–0.2)
BASOPHILS NFR BLD AUTO: 1.3 % (ref 0–1)
BUN SERPL-MCNC: 35 MG/DL (ref 7–18)
CALCIUM SERPL-MCNC: 8.9 MG/DL (ref 8.8–10.2)
CHLORIDE SERPL-SCNC: 106 MEQ/L (ref 98–107)
CO2 SERPL-SCNC: 26 MEQ/L (ref 21–32)
CREAT SERPL-MCNC: 1.74 MG/DL (ref 0.7–1.3)
EOSINOPHIL # BLD AUTO: 0.5 10^3/UL (ref 0–0.5)
EOSINOPHIL NFR BLD AUTO: 8.6 % (ref 0–3)
ERYTHROCYTE [DISTWIDTH] IN BLOOD BY AUTOMATED COUNT: 14.5 % (ref 11.5–14.5)
ERYTHROCYTE [SEDIMENTATION RATE] IN BLOOD BY WESTERGREN METHOD: 28 MM/HR (ref 0–20)
GFR SERPL CREATININE-BSD FRML MDRD: 41.9 ML/MIN/{1.73_M2} (ref 49–?)
GLUCOSE SERPL-MCNC: 133 MG/DL (ref 80–110)
IMM GRANULOCYTES NFR BLD: 1 % (ref 0–0)
LYMPHOCYTES # BLD AUTO: 1.4 10^3/UL (ref 1.5–4.5)
LYMPHOCYTES NFR BLD AUTO: 22.2 % (ref 24–44)
MCH RBC QN AUTO: 27.9 PG (ref 27–33)
MCHC RBC AUTO-ENTMCNC: 31.5 G/DL (ref 32–36.5)
MCV RBC AUTO: 88.5 FL (ref 80–96)
MONOCYTES # BLD AUTO: 0.4 10^3/UL (ref 0–0.8)
MONOCYTES NFR BLD AUTO: 5.9 % (ref 0–5)
NEUTROPHILS # BLD AUTO: 3.9 10^3/UL (ref 1.8–7.7)
NEUTROPHILS NFR BLD AUTO: 61 % (ref 36–66)
NRBC BLD AUTO-RTO: 0 % (ref 0–0)
PLATELET # BLD AUTO: 202 10^3/UL (ref 150–450)
POTASSIUM SERPL-SCNC: 4.5 MEQ/L (ref 3.5–5.1)
SODIUM SERPL-SCNC: 140 MEQ/L (ref 136–145)
WBC # BLD AUTO: 6.3 10^3/UL (ref 4–10)

## 2017-12-26 ENCOUNTER — HOSPITAL ENCOUNTER (OUTPATIENT)
Dept: HOSPITAL 53 - M LAB | Age: 67
End: 2017-12-26
Attending: STUDENT IN AN ORGANIZED HEALTH CARE EDUCATION/TRAINING PROGRAM
Payer: MEDICARE

## 2017-12-26 DIAGNOSIS — J44.9: Primary | ICD-10-CM

## 2017-12-26 LAB
ALBUMIN SERPL BCG-MCNC: 3.9 GM/DL (ref 3.2–5.2)
ALBUMIN/GLOB SERPL: 1.15 {RATIO} (ref 1–1.93)
ALP SERPL-CCNC: 51 U/L (ref 45–117)
ALT SERPL W P-5'-P-CCNC: 14 U/L (ref 12–78)
ANION GAP SERPL CALC-SCNC: 5 MEQ/L (ref 8–16)
AST SERPL-CCNC: 10 U/L (ref 7–37)
BILIRUB SERPL-MCNC: 0.4 MG/DL (ref 0.2–1)
BUN SERPL-MCNC: 29 MG/DL (ref 7–18)
CALCIUM SERPL-MCNC: 8.6 MG/DL (ref 8.8–10.2)
CHLORIDE SERPL-SCNC: 109 MEQ/L (ref 98–107)
CO2 SERPL-SCNC: 27 MEQ/L (ref 21–32)
CREAT SERPL-MCNC: 1.84 MG/DL (ref 0.7–1.3)
GFR SERPL CREATININE-BSD FRML MDRD: 39.3 ML/MIN/{1.73_M2} (ref 49–?)
GLUCOSE SERPL-MCNC: 95 MG/DL (ref 80–110)
POTASSIUM SERPL-SCNC: 4.9 MEQ/L (ref 3.5–5.1)
PROT SERPL-MCNC: 7.3 GM/DL (ref 6.4–8.2)
SODIUM SERPL-SCNC: 141 MEQ/L (ref 136–145)

## 2017-12-26 NOTE — ECGEPIP
Stationary ECG Study

                              [facility]

                                       

                                       Test Date:    2017

Pat Name:     BISI HOLM          Department:   

Patient ID:   [id number]                 Room:         -

Gender:       M                        Technician:   VIKKI

:          1950               Requested By: ALONDRA BARILLAS

Order Number: [id number]     Reading MD:   Simon Briones

                                 Measurements

Intervals                              Axis          

Rate:         64                       P:            64

WV:           173                      QRS:          28

QRSD:         97                       T:            56

QT:           399                                    

QTc:          412                                    

                           Interpretive Statements

Normal sinus rhythm

Low QRS complex voltage in the limb leads

Nonspecific ST-T wave abnormalities

Comparison tracing not on file

Electronically Signed On 2017 21:27:04 EST by Simon Briones

## 2017-12-27 ENCOUNTER — HOSPITAL ENCOUNTER (OUTPATIENT)
Dept: HOSPITAL 53 - M LAB REF | Age: 67
End: 2017-12-27
Attending: INTERNAL MEDICINE
Payer: MEDICARE

## 2017-12-27 DIAGNOSIS — L02.211: Primary | ICD-10-CM

## 2017-12-27 DIAGNOSIS — B95.62: ICD-10-CM

## 2017-12-27 LAB
ANION GAP: 6 MEQ/L (ref 8–16)
BASO #: 0.1 10^3/UL (ref 0–0.2)
BASO %: 1.2 % (ref 0–1)
BLOOD UREA NITROGEN: 29 MG/DL (ref 7–18)
CALCIUM LEVEL: 8.7 MG/DL (ref 8.8–10.2)
CARBON DIOXIDE LEVEL: 27 MEQ/L (ref 21–32)
CHLORIDE LEVEL: 109 MEQ/L (ref 98–107)
CREATININE FOR GFR: 1.82 MG/DL (ref 0.7–1.3)
EOS #: 0.4 10^3/UL (ref 0–0.5)
EOSINOPHIL NFR BLD AUTO: 5.7 % (ref 0–3)
ERYTHROCYTE SEDIMENTATION RATE: 20 MM/HR (ref 0–20)
GFR SERPL CREATININE-BSD FRML MDRD: 39.8 ML/MIN/{1.73_M2} (ref 49–?)
GLUCOSE, FASTING: 81 MG/DL (ref 80–110)
IMMATURE GRANULOCYTE #: 0.1 10^3/UL (ref 0–0)
IMMATURE GRANULOCYTE %: 0.9 % (ref 0–0)
LYMPH #: 1.4 10^3/UL (ref 1.5–4.5)
LYMPH %: 20.9 % (ref 24–44)
MEAN CORPUSCULAR HEMOGLOBIN: 27.8 PG (ref 27–33)
MEAN CORPUSCULAR HGB CONC: 31.6 G/DL (ref 32–36.5)
MEAN CORPUSCULAR VOLUME: 88 FL (ref 80–96)
MONO #: 0.3 10^3/UL (ref 0–0.8)
MONO %: 4.8 % (ref 0–5)
NEUTROPHILS #: 4.5 10^3/UL (ref 1.8–7.7)
NEUTROPHILS %: 66.5 % (ref 36–66)
NRBC BLD AUTO-RTO: 0 % (ref 0–0)
PLATELET COUNT, AUTOMATED: 204 10^3/UL (ref 150–450)
POTASSIUM SERUM: 5.1 MEQ/L (ref 3.5–5.1)
RED CELL DISTRIBUTION WIDTH: 14.5 % (ref 11.5–14.5)
SODIUM LEVEL: 142 MEQ/L (ref 136–145)
VANCOMYCIN RANDOM: 15.4 UG/ML
WHITE BLOOD COUNT: 6.7 10^3/UL (ref 4–10)

## 2017-12-27 PROCEDURE — 80202 ASSAY OF VANCOMYCIN: CPT

## 2017-12-27 NOTE — REP
Clinical:  Chest pain.  History of COPD.  .

 

Comparison: None .

 

Technique:  PA and lateral.

 

Findings:

The mediastinum and cardiac silhouette are normal.  Right PICC line with tip in

the SVC.  The lung fields are clear and without acute consolidation, effusion, or

pneumothorax.  The skeletal structures are intact and normal.

 

Impression:

1.   No acute cardiopulmonary process.

 

 

Signed by

Cuong Issa MD 12/26/2017 09:42 A

## 2018-01-02 ENCOUNTER — HOSPITAL ENCOUNTER (INPATIENT)
Dept: HOSPITAL 53 - M OR | Age: 68
LOS: 12 days | Discharge: HOME HEALTH | DRG: 907 | End: 2018-01-14
Attending: GENERAL PRACTICE | Admitting: SURGERY
Payer: MEDICARE

## 2018-01-02 DIAGNOSIS — I25.2: ICD-10-CM

## 2018-01-02 DIAGNOSIS — F32.9: ICD-10-CM

## 2018-01-02 DIAGNOSIS — Z79.82: ICD-10-CM

## 2018-01-02 DIAGNOSIS — Z88.0: ICD-10-CM

## 2018-01-02 DIAGNOSIS — I25.10: ICD-10-CM

## 2018-01-02 DIAGNOSIS — B95.62: ICD-10-CM

## 2018-01-02 DIAGNOSIS — A41.9: ICD-10-CM

## 2018-01-02 DIAGNOSIS — Z96.643: ICD-10-CM

## 2018-01-02 DIAGNOSIS — N17.0: ICD-10-CM

## 2018-01-02 DIAGNOSIS — K43.2: ICD-10-CM

## 2018-01-02 DIAGNOSIS — T85.79XA: Primary | ICD-10-CM

## 2018-01-02 DIAGNOSIS — N40.0: ICD-10-CM

## 2018-01-02 DIAGNOSIS — G47.33: ICD-10-CM

## 2018-01-02 DIAGNOSIS — F43.10: ICD-10-CM

## 2018-01-02 DIAGNOSIS — N18.3: ICD-10-CM

## 2018-01-02 DIAGNOSIS — E66.01: ICD-10-CM

## 2018-01-02 DIAGNOSIS — F17.210: ICD-10-CM

## 2018-01-02 DIAGNOSIS — E87.6: ICD-10-CM

## 2018-01-02 DIAGNOSIS — M62.08: ICD-10-CM

## 2018-01-02 DIAGNOSIS — I50.32: ICD-10-CM

## 2018-01-02 DIAGNOSIS — Z79.2: ICD-10-CM

## 2018-01-02 DIAGNOSIS — E83.51: ICD-10-CM

## 2018-01-02 DIAGNOSIS — A04.72: ICD-10-CM

## 2018-01-02 DIAGNOSIS — I27.20: ICD-10-CM

## 2018-01-02 DIAGNOSIS — G20: ICD-10-CM

## 2018-01-02 DIAGNOSIS — I13.0: ICD-10-CM

## 2018-01-02 DIAGNOSIS — Z91.048: ICD-10-CM

## 2018-01-02 DIAGNOSIS — J44.1: ICD-10-CM

## 2018-01-02 DIAGNOSIS — F41.9: ICD-10-CM

## 2018-01-02 DIAGNOSIS — E78.5: ICD-10-CM

## 2018-01-02 DIAGNOSIS — I35.0: ICD-10-CM

## 2018-01-02 DIAGNOSIS — Z79.84: ICD-10-CM
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
## 2018-01-02 DIAGNOSIS — Z88.8: ICD-10-CM

## 2018-01-02 DIAGNOSIS — Z79.899: ICD-10-CM

## 2018-01-02 DIAGNOSIS — K21.9: ICD-10-CM

## 2018-01-02 DIAGNOSIS — E11.22: ICD-10-CM

## 2018-01-02 DIAGNOSIS — Z99.81: ICD-10-CM

## 2018-01-02 DIAGNOSIS — G47.00: ICD-10-CM

## 2018-01-02 LAB
GLUCOSE BLDC GLUCOMTR-MCNC: 175 MG/DL (ref 80–115)
GLUCOSE BLDC GLUCOMTR-MCNC: 89 MG/DL (ref 80–115)

## 2018-01-02 PROCEDURE — 0WUF0JZ SUPPLEMENT ABDOMINAL WALL WITH SYNTHETIC SUBSTITUTE, OPEN APPROACH: ICD-10-PCS

## 2018-01-02 PROCEDURE — 0KXK0Z6 TRANSFER RIGHT ABDOMEN MUSCLE, TRANSVERSE RECTUS ABDOMINIS MYOCUTANEOUS FLAP, OPEN APPROACH: ICD-10-PCS

## 2018-01-02 PROCEDURE — 0WPF0JZ REMOVAL OF SYNTHETIC SUBSTITUTE FROM ABDOMINAL WALL, OPEN APPROACH: ICD-10-PCS

## 2018-01-02 RX ADMIN — SODIUM CHLORIDE, POTASSIUM CHLORIDE, SODIUM LACTATE AND CALCIUM CHLORIDE 1 MLS/HR: 600; 310; 30; 20 INJECTION, SOLUTION INTRAVENOUS at 13:30

## 2018-01-02 RX ADMIN — DEXTROSE MONOHYDRATE 1 MLS/HR: 50 INJECTION, SOLUTION INTRAVENOUS at 08:00

## 2018-01-02 RX ADMIN — Medication 1: at 21:00

## 2018-01-02 RX ADMIN — BUPROPION HYDROCHLORIDE 1 MG: 150 TABLET, FILM COATED, EXTENDED RELEASE ORAL at 13:44

## 2018-01-02 RX ADMIN — VANCOMYCIN HYDROCHLORIDE 1 MG: KIT at 21:59

## 2018-01-02 RX ADMIN — SODIUM CHLORIDE, POTASSIUM CHLORIDE, SODIUM LACTATE AND CALCIUM CHLORIDE 1 MLS/HR: 600; 310; 30; 20 INJECTION, SOLUTION INTRAVENOUS at 07:56

## 2018-01-02 RX ADMIN — DEXTROSE MONOHYDRATE 1 MG: 50 INJECTION, SOLUTION INTRAVENOUS at 14:00

## 2018-01-02 RX ADMIN — SODIUM CHLORIDE 1 MLS/HR: 9 INJECTION, SOLUTION INTRAVENOUS at 14:00

## 2018-01-02 RX ADMIN — NALBUPHINE HYDROCHLORIDE 1 MG: 10 INJECTION, SOLUTION INTRAMUSCULAR; INTRAVENOUS; SUBCUTANEOUS at 21:58

## 2018-01-02 RX ADMIN — ESCITALOPRAM OXALATE 1 MG: 10 TABLET, FILM COATED ORAL at 21:59

## 2018-01-02 RX ADMIN — INSULIN LISPRO 1 UNITS: 100 INJECTION, SOLUTION INTRAVENOUS; SUBCUTANEOUS at 17:20

## 2018-01-02 RX ADMIN — PROBIOTIC PRODUCT - TAB 1 EA: TAB at 21:59

## 2018-01-02 RX ADMIN — SODIUM CHLORIDE 1 MLS/HR: 9 INJECTION, SOLUTION INTRAVENOUS at 12:05

## 2018-01-02 RX ADMIN — DUTASTERIDE 1 MG: 0.5 CAPSULE, LIQUID FILLED ORAL at 22:00

## 2018-01-02 RX ADMIN — ZOLPIDEM TARTRATE 1 MG: 10 TABLET, FILM COATED ORAL at 21:34

## 2018-01-02 RX ADMIN — BUDESONIDE AND FORMOTEROL FUMARATE DIHYDRATE 1 PUFF: 160; 4.5 AEROSOL RESPIRATORY (INHALATION) at 19:47

## 2018-01-02 RX ADMIN — ROSUVASTATIN CALCIUM 1 MG: 10 TABLET, FILM COATED ORAL at 22:00

## 2018-01-02 RX ADMIN — CETIRIZINE HYDROCHLORIDE 1 MG: 10 TABLET, FILM COATED ORAL at 22:00

## 2018-01-02 RX ADMIN — CARBIDOPA AND LEVODOPA 1 TAB: 25; 100 TABLET ORAL at 21:59

## 2018-01-02 RX ADMIN — CARBIDOPA AND LEVODOPA 1 TAB: 25; 100 TABLET ORAL at 17:20

## 2018-01-02 RX ADMIN — PROBIOTIC PRODUCT - TAB 1 EA: TAB at 09:00

## 2018-01-02 RX ADMIN — OMEPRAZOLE 1 MG: 20 CAPSULE, DELAYED RELEASE ORAL at 09:00

## 2018-01-02 RX ADMIN — SODIUM CHLORIDE 1 MLS/HR: 9 INJECTION, SOLUTION INTRAVENOUS at 22:03

## 2018-01-02 RX ADMIN — ALBUTEROL SULFATE 1 MG: 2.5 SOLUTION RESPIRATORY (INHALATION) at 08:15

## 2018-01-02 RX ADMIN — ALBUTEROL SULFATE 1 MG: 2.5 SOLUTION RESPIRATORY (INHALATION) at 16:07

## 2018-01-02 RX ADMIN — PROBIOTIC PRODUCT - TAB 1 EA: TAB at 17:19

## 2018-01-02 RX ADMIN — ALBUTEROL SULFATE 1 MG: 2.5 SOLUTION RESPIRATORY (INHALATION) at 19:47

## 2018-01-02 RX ADMIN — TAMSULOSIN HYDROCHLORIDE 1 MG: 0.4 CAPSULE ORAL at 22:00

## 2018-01-03 LAB
ALBUMIN: 3.5 GM/DL (ref 3.2–5.2)
ALBUMIN: 3.6 GM/DL (ref 3.2–5.2)
ANION GAP: 8 MEQ/L (ref 8–16)
ANION GAP: 9 MEQ/L (ref 8–16)
ANION GAP: 9 MEQ/L (ref 8–16)
BLOOD UREA NITROGEN: 36 MG/DL (ref 7–18)
BLOOD UREA NITROGEN: 43 MG/DL (ref 7–18)
BLOOD UREA NITROGEN: 48 MG/DL (ref 7–18)
CALCIUM LEVEL: 6.6 MG/DL (ref 8.8–10.2)
CALCIUM LEVEL: 7 MG/DL (ref 8.8–10.2)
CALCIUM LEVEL: 7.4 MG/DL (ref 8.8–10.2)
CARBON DIOXIDE LEVEL: 21 MEQ/L (ref 21–32)
CARBON DIOXIDE LEVEL: 22 MEQ/L (ref 21–32)
CARBON DIOXIDE LEVEL: 22 MEQ/L (ref 21–32)
CHLORIDE LEVEL: 108 MEQ/L (ref 98–107)
CHLORIDE LEVEL: 110 MEQ/L (ref 98–107)
CHLORIDE LEVEL: 111 MEQ/L (ref 98–107)
CREATININE FOR GFR: 3.28 MG/DL (ref 0.7–1.3)
CREATININE FOR GFR: 4.3 MG/DL (ref 0.7–1.3)
CREATININE FOR GFR: 4.63 MG/DL (ref 0.7–1.3)
GFR SERPL CREATININE-BSD FRML MDRD: 13.5 ML/MIN/{1.73_M2} (ref 49–?)
GFR SERPL CREATININE-BSD FRML MDRD: 14.7 ML/MIN/{1.73_M2} (ref 49–?)
GFR SERPL CREATININE-BSD FRML MDRD: 20.1 ML/MIN/{1.73_M2} (ref 49–?)
GLUCOSE BLDC GLUCOMTR-MCNC: 107 MG/DL (ref 80–115)
GLUCOSE BLDC GLUCOMTR-MCNC: 118 MG/DL (ref 80–115)
GLUCOSE BLDC GLUCOMTR-MCNC: 176 MG/DL (ref 80–115)
GLUCOSE BLDC GLUCOMTR-MCNC: 176 MG/DL (ref 80–115)
GLUCOSE BLDC GLUCOMTR-MCNC: 218 MG/DL (ref 80–115)
GLUCOSE BLDC GLUCOMTR-MCNC: 89 MG/DL (ref 80–115)
GLUCOSE, FASTING: 106 MG/DL (ref 80–110)
GLUCOSE, FASTING: 148 MG/DL (ref 80–110)
GLUCOSE, FASTING: 157 MG/DL (ref 80–110)
HEMATOCRIT: 32.7 % (ref 42–52)
HEMATOCRIT: 34.1 % (ref 42–52)
HEMOGLOBIN: 10.1 G/DL (ref 14–18)
HEMOGLOBIN: 10.4 G/DL (ref 14–18)
LACTIC ACID SEPSIS PROTOCOL: 1.1 MMOL/L (ref 0.4–2)
MAGNESIUM LEVEL: 1.7 MG/DL (ref 1.8–2.4)
MAGNESIUM LEVEL: 2 MG/DL (ref 1.8–2.4)
MEAN CORPUSCULAR HEMOGLOBIN: 27.8 PG (ref 27–33)
MEAN CORPUSCULAR HEMOGLOBIN: 28.5 PG (ref 27–33)
MEAN CORPUSCULAR HGB CONC: 30.5 G/DL (ref 32–36.5)
MEAN CORPUSCULAR HGB CONC: 30.9 G/DL (ref 32–36.5)
MEAN CORPUSCULAR VOLUME: 91.2 FL (ref 80–96)
MEAN CORPUSCULAR VOLUME: 92.1 FL (ref 80–96)
NRBC BLD AUTO-RTO: 0 % (ref 0–0)
NRBC BLD AUTO-RTO: 0 % (ref 0–0)
OSMOLALITY SERUM: 303 MOSM/KG (ref 280–301)
PHOSPHORUS LEVEL: 5.6 MG/DL (ref 2.5–4.9)
PHOSPHORUS LEVEL: 5.8 MG/DL (ref 2.5–4.9)
PLATELET COUNT, AUTOMATED: 187 10^3/UL (ref 150–450)
PLATELET COUNT, AUTOMATED: 203 10^3/UL (ref 150–450)
POTASSIUM SERUM: 5.8 MEQ/L (ref 3.5–5.1)
POTASSIUM SERUM: 6.1 MEQ/L (ref 3.5–5.1)
POTASSIUM SERUM: 6.4 MEQ/L (ref 3.5–5.1)
RED BLOOD COUNT: 3.55 10^6/UL (ref 4.3–6.1)
RED BLOOD COUNT: 3.74 10^6/UL (ref 4.3–6.1)
RED CELL DISTRIBUTION WIDTH: 14.6 % (ref 11.5–14.5)
RED CELL DISTRIBUTION WIDTH: 14.8 % (ref 11.5–14.5)
SODIUM LEVEL: 138 MEQ/L (ref 136–145)
SODIUM LEVEL: 140 MEQ/L (ref 136–145)
SODIUM LEVEL: 142 MEQ/L (ref 136–145)
VANCOMYCIN RANDOM: 23.9 UG/ML
WHITE BLOOD COUNT: 11.3 10^3/UL (ref 4–10)
WHITE BLOOD COUNT: 11.4 10^3/UL (ref 4–10)

## 2018-01-03 RX ADMIN — ONDANSETRON 1 MG: 2 INJECTION INTRAMUSCULAR; INTRAVENOUS at 04:16

## 2018-01-03 RX ADMIN — VANCOMYCIN HYDROCHLORIDE 1 MG: KIT at 09:23

## 2018-01-03 RX ADMIN — CARBIDOPA AND LEVODOPA 1 TAB: 25; 100 TABLET ORAL at 09:23

## 2018-01-03 RX ADMIN — INSULIN LISPRO 1 UNITS: 100 INJECTION, SOLUTION INTRAVENOUS; SUBCUTANEOUS at 17:39

## 2018-01-03 RX ADMIN — VANCOMYCIN HYDROCHLORIDE 1 MG: KIT at 21:39

## 2018-01-03 RX ADMIN — BUDESONIDE AND FORMOTEROL FUMARATE DIHYDRATE 1 PUFF: 160; 4.5 AEROSOL RESPIRATORY (INHALATION) at 08:05

## 2018-01-03 RX ADMIN — INSULIN LISPRO 1 UNITS: 100 INJECTION, SOLUTION INTRAVENOUS; SUBCUTANEOUS at 23:36

## 2018-01-03 RX ADMIN — METRONIDAZOLE 1 MLS/HR: 500 INJECTION, SOLUTION INTRAVENOUS at 12:34

## 2018-01-03 RX ADMIN — ALBUTEROL SULFATE 1 MG: 2.5 SOLUTION RESPIRATORY (INHALATION) at 12:13

## 2018-01-03 RX ADMIN — INSULIN HUMAN 1 UNITS: 100 INJECTION, SOLUTION PARENTERAL at 02:45

## 2018-01-03 RX ADMIN — PROBIOTIC PRODUCT - TAB 1 EA: TAB at 16:02

## 2018-01-03 RX ADMIN — SODIUM CHLORIDE 1 ML: 9 INJECTION, SOLUTION INTRAMUSCULAR; INTRAVENOUS; SUBCUTANEOUS at 04:16

## 2018-01-03 RX ADMIN — INSULIN LISPRO 1 UNITS: 100 INJECTION, SOLUTION INTRAVENOUS; SUBCUTANEOUS at 00:00

## 2018-01-03 RX ADMIN — ALBUTEROL SULFATE 1 MG: 2.5 SOLUTION RESPIRATORY (INHALATION) at 08:00

## 2018-01-03 RX ADMIN — DEXTROSE MONOHYDRATE 1 MG: 50 INJECTION, SOLUTION INTRAVENOUS at 09:23

## 2018-01-03 RX ADMIN — Medication 1 UNITS: at 17:39

## 2018-01-03 RX ADMIN — METRONIDAZOLE 1 MLS/HR: 500 INJECTION, SOLUTION INTRAVENOUS at 21:38

## 2018-01-03 RX ADMIN — SODIUM CHLORIDE 1 MLS/HR: 9 INJECTION, SOLUTION INTRAVENOUS at 18:00

## 2018-01-03 RX ADMIN — SODIUM CHLORIDE 1 MLS/HR: 9 INJECTION, SOLUTION INTRAVENOUS at 06:00

## 2018-01-03 RX ADMIN — SODIUM CHLORIDE 1 ML: 9 INJECTION, SOLUTION INTRAVENOUS at 11:35

## 2018-01-03 RX ADMIN — Medication 1 UNITS: at 04:16

## 2018-01-03 RX ADMIN — MONTELUKAST SODIUM 1 MG: 10 TABLET, FILM COATED ORAL at 09:23

## 2018-01-03 RX ADMIN — ESCITALOPRAM OXALATE 1 MG: 10 TABLET, FILM COATED ORAL at 21:38

## 2018-01-03 RX ADMIN — INSULIN LISPRO 1 UNITS: 100 INJECTION, SOLUTION INTRAVENOUS; SUBCUTANEOUS at 12:00

## 2018-01-03 RX ADMIN — Medication 1: at 21:00

## 2018-01-03 RX ADMIN — DEXTROSE MONOHYDRATE 1 ML: 25 INJECTION, SOLUTION INTRAVENOUS at 03:09

## 2018-01-03 RX ADMIN — PROBIOTIC PRODUCT - TAB 1 EA: TAB at 21:38

## 2018-01-03 RX ADMIN — PROBIOTIC PRODUCT - TAB 1 EA: TAB at 09:23

## 2018-01-03 RX ADMIN — INSULIN LISPRO 1 UNITS: 100 INJECTION, SOLUTION INTRAVENOUS; SUBCUTANEOUS at 03:09

## 2018-01-03 RX ADMIN — BUPROPION HYDROCHLORIDE 1 MG: 150 TABLET, FILM COATED, EXTENDED RELEASE ORAL at 09:23

## 2018-01-03 RX ADMIN — BUDESONIDE AND FORMOTEROL FUMARATE DIHYDRATE 1 PUFF: 160; 4.5 AEROSOL RESPIRATORY (INHALATION) at 20:21

## 2018-01-03 RX ADMIN — SODIUM CHLORIDE 1 MLS/HR: 9 INJECTION, SOLUTION INTRAVENOUS at 12:34

## 2018-01-03 RX ADMIN — SODIUM CHLORIDE 1 MLS/HR: 9 INJECTION, SOLUTION INTRAVENOUS at 21:39

## 2018-01-03 RX ADMIN — LEVALBUTEROL HYDROCHLORIDE 1 MG: 1.25 SOLUTION, CONCENTRATE RESPIRATORY (INHALATION) at 04:51

## 2018-01-03 RX ADMIN — SODIUM CHLORIDE 1 ML: 9 INJECTION, SOLUTION INTRAMUSCULAR; INTRAVENOUS; SUBCUTANEOUS at 17:39

## 2018-01-03 RX ADMIN — ALBUTEROL SULFATE 1 MG: 2.5 SOLUTION RESPIRATORY (INHALATION) at 20:00

## 2018-01-03 RX ADMIN — SODIUM POLYSTYRENE SULFONATE 1 GM: 15 SUSPENSION ORAL; RECTAL at 03:08

## 2018-01-03 RX ADMIN — ALBUTEROL SULFATE 1 MG: 2.5 SOLUTION RESPIRATORY (INHALATION) at 15:41

## 2018-01-03 RX ADMIN — Medication 1 MG: at 14:48

## 2018-01-03 RX ADMIN — INSULIN LISPRO 4 UNITS: 100 INJECTION, SOLUTION INTRAVENOUS; SUBCUTANEOUS at 06:17

## 2018-01-04 LAB
ANION GAP: 8 MEQ/L (ref 8–16)
APPEARANCE, URINE: (no result)
BACTERIA UR QL AUTO: NEGATIVE
BILIRUBIN, URINE AUTO: NEGATIVE
BLOOD UREA NITROGEN: 53 MG/DL (ref 7–18)
BLOOD, URINE BLOOD: (no result)
CALCIUM LEVEL: 7.1 MG/DL (ref 8.8–10.2)
CARBON DIOXIDE LEVEL: 23 MEQ/L (ref 21–32)
CHLORIDE LEVEL: 111 MEQ/L (ref 98–107)
CREATININE FOR GFR: 3.61 MG/DL (ref 0.7–1.3)
CREATININE,RANDOM URINE: 291 MG/DL
GFR SERPL CREATININE-BSD FRML MDRD: 18 ML/MIN/{1.73_M2} (ref 49–?)
GLUCOSE BLDC GLUCOMTR-MCNC: 109 MG/DL (ref 80–115)
GLUCOSE BLDC GLUCOMTR-MCNC: 110 MG/DL (ref 80–115)
GLUCOSE BLDC GLUCOMTR-MCNC: 145 MG/DL (ref 80–115)
GLUCOSE, FASTING: 100 MG/DL (ref 80–110)
GLUCOSE, URINE (UA) AUTO: NEGATIVE MG/DL
HEMATOCRIT: 32.1 % (ref 42–52)
HEMOGLOBIN: 9.7 G/DL (ref 14–18)
IONIZED CALCIUM: 3.9 MG/DL (ref 4.5–5.3)
KETONE, URINE AUTO: (no result) MG/DL
LEUKOCYTE ESTERASE UR QL STRIP.AUTO: NEGATIVE
MAGNESIUM LEVEL: 1.8 MG/DL (ref 1.8–2.4)
MEAN CORPUSCULAR HEMOGLOBIN: 28.4 PG (ref 27–33)
MEAN CORPUSCULAR HGB CONC: 30.2 G/DL (ref 32–36.5)
MEAN CORPUSCULAR VOLUME: 94.1 FL (ref 80–96)
MUCUS, URINE: (no result)
NITRITE, URINE AUTO: NEGATIVE
NRBC BLD AUTO-RTO: 0 % (ref 0–0)
OSMOLALITY URINE: 389 MOSM/KG (ref 500–800)
PH,URINE: 5 UNITS (ref 5–9)
PLATELET COUNT, AUTOMATED: 148 10^3/UL (ref 150–450)
POTASSIUM RANDOM URINE: 43.1 MEQ/L
POTASSIUM SERUM: 5.2 MEQ/L (ref 3.5–5.1)
PROT UR QL STRIP.AUTO: NEGATIVE MG/DL
RBC, URINE AUTO: 20 /HPF (ref 0–3)
RED BLOOD COUNT: 3.41 10^6/UL (ref 4.3–6.1)
RED CELL DISTRIBUTION WIDTH: 14.7 % (ref 11.5–14.5)
SODIUM LEVEL: 142 MEQ/L (ref 136–145)
SODIUM,RANDOM URINE: 25 MEQ/L
SPECIFIC GRAVITY URINE AUTO: 1.01 (ref 1–1.03)
SQUAMOUS #/AREA URNS AUTO: 0 /HPF (ref 0–6)
UROBILINOGEN, URINE AUTO: 0.2 MG/DL (ref 0–2)
VANCOMYCIN RANDOM: 15 UG/ML
WBC, URINE AUTO: 5 /HPF (ref 0–3)
WHITE BLOOD COUNT: 10.6 10^3/UL (ref 4–10)

## 2018-01-04 RX ADMIN — SODIUM CHLORIDE 1 MLS/HR: 4.5 INJECTION, SOLUTION INTRAVENOUS at 21:39

## 2018-01-04 RX ADMIN — ALBUTEROL SULFATE 1 MG: 2.5 SOLUTION RESPIRATORY (INHALATION) at 11:13

## 2018-01-04 RX ADMIN — INFLUENZA A VIRUS A/MICHIGAN/45/2015 X-275 (H1N1) ANTIGEN (FORMALDEHYDE INACTIVATED), INFLUENZA A VIRUS A/SINGAPORE/INFIMH-16-0019/2016 IVR-186 (H3N2) ANTIGEN (FORMALDEHYDE INACTIVATED), AND INFLUENZA B VIRUS B/MARYLAND/15/2016 BX-69A (A B/COLORADO/6/2017-LIKE VIRUS) ANTIGEN (FORMALDEHYDE INACTIVATED) 1 ML: 60; 60; 60 INJECTION, SUSPENSION INTRAMUSCULAR at 09:00

## 2018-01-04 RX ADMIN — SODIUM CHLORIDE 1 MLS/HR: 4.5 INJECTION, SOLUTION INTRAVENOUS at 14:02

## 2018-01-04 RX ADMIN — SODIUM CHLORIDE 1 ML: 9 INJECTION, SOLUTION INTRAMUSCULAR; INTRAVENOUS; SUBCUTANEOUS at 05:37

## 2018-01-04 RX ADMIN — PROBIOTIC PRODUCT - TAB 1 EA: TAB at 16:06

## 2018-01-04 RX ADMIN — SODIUM CHLORIDE 1 MLS/HR: 9 INJECTION, SOLUTION INTRAVENOUS at 09:40

## 2018-01-04 RX ADMIN — SODIUM CHLORIDE 1 ML: 9 INJECTION, SOLUTION INTRAMUSCULAR; INTRAVENOUS; SUBCUTANEOUS at 21:41

## 2018-01-04 RX ADMIN — ONDANSETRON 1 MG: 2 INJECTION INTRAMUSCULAR; INTRAVENOUS at 21:40

## 2018-01-04 RX ADMIN — PROBIOTIC PRODUCT - TAB 1 EA: TAB at 20:15

## 2018-01-04 RX ADMIN — MONTELUKAST SODIUM 1 MG: 10 TABLET, FILM COATED ORAL at 09:43

## 2018-01-04 RX ADMIN — ACETAMINOPHEN 1 MG: 325 TABLET ORAL at 21:40

## 2018-01-04 RX ADMIN — DEXTROSE AND SODIUM CHLORIDE 1 MLS/HR: 10; .2 INJECTION, SOLUTION INTRAVENOUS at 09:37

## 2018-01-04 RX ADMIN — ALVIMOPAN 1 MG: 12 CAPSULE ORAL at 20:15

## 2018-01-04 RX ADMIN — METRONIDAZOLE 1 MLS/HR: 500 INJECTION, SOLUTION INTRAVENOUS at 05:37

## 2018-01-04 RX ADMIN — PNEUMOCOCCAL 13-VALENT CONJUGATE VACCINE 1 ML: 2.2; 2.2; 2.2; 2.2; 2.2; 4.4; 2.2; 2.2; 2.2; 2.2; 2.2; 2.2; 2.2 INJECTION, SUSPENSION INTRAMUSCULAR at 09:00

## 2018-01-04 RX ADMIN — BUDESONIDE AND FORMOTEROL FUMARATE DIHYDRATE 1 PUFF: 160; 4.5 AEROSOL RESPIRATORY (INHALATION) at 19:55

## 2018-01-04 RX ADMIN — VANCOMYCIN HYDROCHLORIDE 1 MG: KIT at 20:14

## 2018-01-04 RX ADMIN — ALVIMOPAN 1 MG: 12 CAPSULE ORAL at 09:44

## 2018-01-04 RX ADMIN — BUDESONIDE AND FORMOTEROL FUMARATE DIHYDRATE 1 PUFF: 160; 4.5 AEROSOL RESPIRATORY (INHALATION) at 08:01

## 2018-01-04 RX ADMIN — INSULIN LISPRO 1 UNITS: 100 INJECTION, SOLUTION INTRAVENOUS; SUBCUTANEOUS at 17:38

## 2018-01-04 RX ADMIN — INSULIN LISPRO 1 UNITS: 100 INJECTION, SOLUTION INTRAVENOUS; SUBCUTANEOUS at 11:46

## 2018-01-04 RX ADMIN — PROBIOTIC PRODUCT - TAB 1 EA: TAB at 09:44

## 2018-01-04 RX ADMIN — ALBUTEROL SULFATE 1 MG: 2.5 SOLUTION RESPIRATORY (INHALATION) at 08:00

## 2018-01-04 RX ADMIN — METRONIDAZOLE 1 MLS/HR: 500 INJECTION, SOLUTION INTRAVENOUS at 12:28

## 2018-01-04 RX ADMIN — Medication 1 UNITS: at 17:51

## 2018-01-04 RX ADMIN — BUPROPION HYDROCHLORIDE 1 MG: 150 TABLET, FILM COATED, EXTENDED RELEASE ORAL at 09:44

## 2018-01-04 RX ADMIN — SODIUM CHLORIDE 1 MLS/HR: 9 INJECTION, SOLUTION INTRAVENOUS at 06:20

## 2018-01-04 RX ADMIN — VANCOMYCIN HYDROCHLORIDE 1 MG: KIT at 09:44

## 2018-01-04 RX ADMIN — SODIUM CHLORIDE 1 MLS/HR: 9 INJECTION, SOLUTION INTRAVENOUS at 09:44

## 2018-01-04 RX ADMIN — ALBUTEROL SULFATE 1 MG: 2.5 SOLUTION RESPIRATORY (INHALATION) at 19:56

## 2018-01-04 RX ADMIN — ESCITALOPRAM OXALATE 1 MG: 10 TABLET, FILM COATED ORAL at 20:15

## 2018-01-04 RX ADMIN — Medication 1 UNITS: at 05:37

## 2018-01-04 RX ADMIN — SODIUM CHLORIDE 1 ML: 9 INJECTION, SOLUTION INTRAMUSCULAR; INTRAVENOUS; SUBCUTANEOUS at 17:50

## 2018-01-04 RX ADMIN — ALBUTEROL SULFATE 1 MG: 2.5 SOLUTION RESPIRATORY (INHALATION) at 15:29

## 2018-01-04 RX ADMIN — DEXTROSE MONOHYDRATE 1 MG: 50 INJECTION, SOLUTION INTRAVENOUS at 09:40

## 2018-01-04 RX ADMIN — Medication 1 UNITS: at 21:41

## 2018-01-04 RX ADMIN — Medication 1: at 20:02

## 2018-01-04 RX ADMIN — INSULIN LISPRO 1 UNITS: 100 INJECTION, SOLUTION INTRAVENOUS; SUBCUTANEOUS at 05:31

## 2018-01-05 LAB
ANION GAP: 12 MEQ/L (ref 8–16)
BLOOD UREA NITROGEN: 41 MG/DL (ref 7–18)
CALCIUM LEVEL: 7.5 MG/DL (ref 8.8–10.2)
CARBON DIOXIDE LEVEL: 19 MEQ/L (ref 21–32)
CHLORIDE LEVEL: 109 MEQ/L (ref 98–107)
CREATININE FOR GFR: 1.95 MG/DL (ref 0.7–1.3)
GFR SERPL CREATININE-BSD FRML MDRD: 36.7 ML/MIN/{1.73_M2} (ref 49–?)
GLUCOSE BLDC GLUCOMTR-MCNC: 118 MG/DL (ref 80–115)
GLUCOSE BLDC GLUCOMTR-MCNC: 133 MG/DL (ref 80–115)
GLUCOSE BLDC GLUCOMTR-MCNC: 152 MG/DL (ref 80–115)
GLUCOSE BLDC GLUCOMTR-MCNC: 178 MG/DL (ref 80–115)
GLUCOSE, FASTING: 186 MG/DL (ref 80–110)
HEMATOCRIT: 30.2 % (ref 42–52)
HEMOGLOBIN: 9.1 G/DL (ref 14–18)
MAGNESIUM LEVEL: 1.9 MG/DL (ref 1.8–2.4)
MEAN CORPUSCULAR HEMOGLOBIN: 28.1 PG (ref 27–33)
MEAN CORPUSCULAR HGB CONC: 30.1 G/DL (ref 32–36.5)
MEAN CORPUSCULAR VOLUME: 93.2 FL (ref 80–96)
NRBC BLD AUTO-RTO: 0 % (ref 0–0)
PLATELET COUNT, AUTOMATED: 137 10^3/UL (ref 150–450)
POTASSIUM SERUM: 5.5 MEQ/L (ref 3.5–5.1)
RED BLOOD COUNT: 3.24 10^6/UL (ref 4.3–6.1)
RED CELL DISTRIBUTION WIDTH: 14.2 % (ref 11.5–14.5)
SODIUM LEVEL: 140 MEQ/L (ref 136–145)
VANCOMYCIN RANDOM: 9.2 UG/ML
WHITE BLOOD COUNT: 9.5 10^3/UL (ref 4–10)

## 2018-01-05 RX ADMIN — Medication 1 UNITS: at 05:42

## 2018-01-05 RX ADMIN — Medication 1 UNITS: at 02:53

## 2018-01-05 RX ADMIN — DEXTROSE AND SODIUM CHLORIDE 1 MLS/HR: 10; .2 INJECTION, SOLUTION INTRAVENOUS at 10:06

## 2018-01-05 RX ADMIN — PROBIOTIC PRODUCT - TAB 1 EA: TAB at 09:02

## 2018-01-05 RX ADMIN — MONTELUKAST SODIUM 1 MG: 10 TABLET, FILM COATED ORAL at 09:02

## 2018-01-05 RX ADMIN — ALBUTEROL SULFATE 1 MG: 2.5 SOLUTION RESPIRATORY (INHALATION) at 08:00

## 2018-01-05 RX ADMIN — ALBUTEROL SULFATE 1 MG: 2.5 SOLUTION RESPIRATORY (INHALATION) at 19:53

## 2018-01-05 RX ADMIN — PROBIOTIC PRODUCT - TAB 1 EA: TAB at 21:21

## 2018-01-05 RX ADMIN — HYDROCODONE BITARTRATE AND ACETAMINOPHEN 1 TAB: 5; 325 TABLET ORAL at 14:59

## 2018-01-05 RX ADMIN — SODIUM CHLORIDE 1 ML: 9 INJECTION, SOLUTION INTRAMUSCULAR; INTRAVENOUS; SUBCUTANEOUS at 05:41

## 2018-01-05 RX ADMIN — Medication 1: at 20:56

## 2018-01-05 RX ADMIN — LEVALBUTEROL HYDROCHLORIDE 1 MG: 1.25 SOLUTION, CONCENTRATE RESPIRATORY (INHALATION) at 04:58

## 2018-01-05 RX ADMIN — SODIUM CHLORIDE 1 MLS/HR: 4.5 INJECTION, SOLUTION INTRAVENOUS at 12:40

## 2018-01-05 RX ADMIN — HYDROCODONE BITARTRATE AND ACETAMINOPHEN 1 TAB: 5; 325 TABLET ORAL at 21:22

## 2018-01-05 RX ADMIN — ESCITALOPRAM OXALATE 1 MG: 10 TABLET, FILM COATED ORAL at 21:21

## 2018-01-05 RX ADMIN — ALBUTEROL SULFATE 1 MG: 2.5 SOLUTION RESPIRATORY (INHALATION) at 11:24

## 2018-01-05 RX ADMIN — PROMETHAZINE HYDROCHLORIDE 1 MG: 25 INJECTION INTRAMUSCULAR; INTRAVENOUS at 02:07

## 2018-01-05 RX ADMIN — ALVIMOPAN 1 MG: 12 CAPSULE ORAL at 09:02

## 2018-01-05 RX ADMIN — DEXTROSE MONOHYDRATE 1 MG: 50 INJECTION, SOLUTION INTRAVENOUS at 08:59

## 2018-01-05 RX ADMIN — SODIUM CHLORIDE 1 ML: 9 INJECTION, SOLUTION INTRAMUSCULAR; INTRAVENOUS; SUBCUTANEOUS at 02:53

## 2018-01-05 RX ADMIN — DEXTROSE MONOHYDRATE 1 MLS/HR: 50 INJECTION, SOLUTION INTRAVENOUS at 08:58

## 2018-01-05 RX ADMIN — VANCOMYCIN HYDROCHLORIDE 1 MG: KIT at 21:21

## 2018-01-05 RX ADMIN — LEVALBUTEROL HYDROCHLORIDE 1 MG: 1.25 SOLUTION, CONCENTRATE RESPIRATORY (INHALATION) at 17:57

## 2018-01-05 RX ADMIN — BUPROPION HYDROCHLORIDE 1 MG: 150 TABLET, FILM COATED, EXTENDED RELEASE ORAL at 09:02

## 2018-01-05 RX ADMIN — BUDESONIDE AND FORMOTEROL FUMARATE DIHYDRATE 1 PUFF: 160; 4.5 AEROSOL RESPIRATORY (INHALATION) at 08:22

## 2018-01-05 RX ADMIN — INSULIN LISPRO 4 UNITS: 100 INJECTION, SOLUTION INTRAVENOUS; SUBCUTANEOUS at 17:35

## 2018-01-05 RX ADMIN — INSULIN LISPRO 1 UNITS: 100 INJECTION, SOLUTION INTRAVENOUS; SUBCUTANEOUS at 00:00

## 2018-01-05 RX ADMIN — INSULIN LISPRO 1 UNITS: 100 INJECTION, SOLUTION INTRAVENOUS; SUBCUTANEOUS at 20:55

## 2018-01-05 RX ADMIN — CARBIDOPA AND LEVODOPA 1 TAB: 25; 100 TABLET ORAL at 15:00

## 2018-01-05 RX ADMIN — INSULIN LISPRO 2 UNITS: 100 INJECTION, SOLUTION INTRAVENOUS; SUBCUTANEOUS at 12:40

## 2018-01-05 RX ADMIN — SODIUM CHLORIDE 1 ML: 9 INJECTION, SOLUTION INTRAMUSCULAR; INTRAVENOUS; SUBCUTANEOUS at 17:34

## 2018-01-05 RX ADMIN — VANCOMYCIN HYDROCHLORIDE 1 MG: KIT at 09:03

## 2018-01-05 RX ADMIN — PROBIOTIC PRODUCT - TAB 1 EA: TAB at 15:00

## 2018-01-05 RX ADMIN — Medication 1 UNITS: at 17:34

## 2018-01-05 RX ADMIN — ALBUTEROL SULFATE 1 MG: 2.5 SOLUTION RESPIRATORY (INHALATION) at 15:07

## 2018-01-05 RX ADMIN — CARBIDOPA AND LEVODOPA 1 TAB: 25; 100 TABLET ORAL at 21:21

## 2018-01-05 RX ADMIN — INSULIN LISPRO 4 UNITS: 100 INJECTION, SOLUTION INTRAVENOUS; SUBCUTANEOUS at 06:04

## 2018-01-05 RX ADMIN — ACETAMINOPHEN 1 MG: 325 TABLET ORAL at 04:48

## 2018-01-05 RX ADMIN — SODIUM CHLORIDE 1 MLS/HR: 4.5 INJECTION, SOLUTION INTRAVENOUS at 05:41

## 2018-01-05 RX ADMIN — BUDESONIDE AND FORMOTEROL FUMARATE DIHYDRATE 1 PUFF: 160; 4.5 AEROSOL RESPIRATORY (INHALATION) at 19:52

## 2018-01-05 RX ADMIN — CARBIDOPA AND LEVODOPA 1 TAB: 25; 100 TABLET ORAL at 10:06

## 2018-01-06 LAB
ABG BASE EXCESS: -3.5 (ref -2–2)
ABG HCO3: 21.7 MEQ/L (ref 22–26)
ABG O2 SATURATION: 97.6 % (ref 95–99)
ABG PARTIAL PRESSURE CO2: 40 MMHG (ref 35–45)
ABG PARTIAL PRESSURE O2: 96.1 MMHG (ref 75–100)
ABG PH (ARTERIAL): 7.35 UNITS (ref 7.35–7.45)
ABG STANDARD HCO3: 21.5 MEQ/L (ref 22–26)
ABG TOTAL CO2: 23 MEQ/L (ref 23–31)
ANION GAP: 7 MEQ/L (ref 8–16)
BLOOD UREA NITROGEN: 25 MG/DL (ref 7–18)
CALCIUM LEVEL: 8.3 MG/DL (ref 8.8–10.2)
CARBON DIOXIDE LEVEL: 27 MEQ/L (ref 21–32)
CHLORIDE LEVEL: 107 MEQ/L (ref 98–107)
CREATININE FOR GFR: 1.21 MG/DL (ref 0.7–1.3)
GFR SERPL CREATININE-BSD FRML MDRD: > 60 ML/MIN/{1.73_M2} (ref 49–?)
GLUCOSE BLDC GLUCOMTR-MCNC: 207 MG/DL (ref 80–115)
GLUCOSE BLDC GLUCOMTR-MCNC: 304 MG/DL (ref 80–115)
GLUCOSE, FASTING: 133 MG/DL (ref 80–110)
HEMATOCRIT: 28.9 % (ref 42–52)
HEMOGLOBIN: 9.1 G/DL (ref 14–18)
MAGNESIUM LEVEL: 1.6 MG/DL (ref 1.8–2.4)
MEAN CORPUSCULAR HEMOGLOBIN: 27.7 PG (ref 27–33)
MEAN CORPUSCULAR HGB CONC: 31.5 G/DL (ref 32–36.5)
MEAN CORPUSCULAR VOLUME: 88.1 FL (ref 80–96)
NRBC BLD AUTO-RTO: 0 % (ref 0–0)
PLATELET COUNT, AUTOMATED: 177 10^3/UL (ref 150–450)
POTASSIUM SERUM: 4.1 MEQ/L (ref 3.5–5.1)
RED BLOOD COUNT: 3.28 10^6/UL (ref 4.3–6.1)
RED CELL DISTRIBUTION WIDTH: 14.1 % (ref 11.5–14.5)
SODIUM LEVEL: 141 MEQ/L (ref 136–145)
WHITE BLOOD COUNT: 8.1 10^3/UL (ref 4–10)

## 2018-01-06 RX ADMIN — Medication 1: at 21:00

## 2018-01-06 RX ADMIN — DEXTROSE MONOHYDRATE 1 MG: 50 INJECTION, SOLUTION INTRAVENOUS at 08:33

## 2018-01-06 RX ADMIN — MONTELUKAST SODIUM 1 MG: 10 TABLET, FILM COATED ORAL at 08:35

## 2018-01-06 RX ADMIN — PROBIOTIC PRODUCT - TAB 1 EA: TAB at 08:33

## 2018-01-06 RX ADMIN — FORMOTEROL FUMARATE DIHYDRATE 1 MCG: 20 SOLUTION RESPIRATORY (INHALATION) at 20:00

## 2018-01-06 RX ADMIN — DEXTROSE AND SODIUM CHLORIDE 1 MLS/HR: 10; .2 INJECTION, SOLUTION INTRAVENOUS at 15:54

## 2018-01-06 RX ADMIN — Medication 1 UNITS: at 11:06

## 2018-01-06 RX ADMIN — INSULIN LISPRO 2 UNITS: 100 INJECTION, SOLUTION INTRAVENOUS; SUBCUTANEOUS at 08:33

## 2018-01-06 RX ADMIN — CARBIDOPA AND LEVODOPA 1 TAB: 25; 100 TABLET ORAL at 15:54

## 2018-01-06 RX ADMIN — VANCOMYCIN HYDROCHLORIDE 1 MG: KIT at 21:23

## 2018-01-06 RX ADMIN — BUDESONIDE AND FORMOTEROL FUMARATE DIHYDRATE 1 PUFF: 160; 4.5 AEROSOL RESPIRATORY (INHALATION) at 08:36

## 2018-01-06 RX ADMIN — HYDROCODONE BITARTRATE AND ACETAMINOPHEN 1 TAB: 5; 325 TABLET ORAL at 08:35

## 2018-01-06 RX ADMIN — BUDESONIDE 1 MG: 0.5 INHALANT RESPIRATORY (INHALATION) at 20:00

## 2018-01-06 RX ADMIN — PROBIOTIC PRODUCT - TAB 1 EA: TAB at 15:54

## 2018-01-06 RX ADMIN — CARBIDOPA AND LEVODOPA 1 TAB: 25; 100 TABLET ORAL at 08:36

## 2018-01-06 RX ADMIN — METHYLPREDNISOLONE SODIUM SUCCINATE 1 MG: 125 INJECTION, POWDER, FOR SOLUTION INTRAMUSCULAR; INTRAVENOUS at 11:05

## 2018-01-06 RX ADMIN — SODIUM CHLORIDE 1 ML: 9 INJECTION, SOLUTION INTRAMUSCULAR; INTRAVENOUS; SUBCUTANEOUS at 11:06

## 2018-01-06 RX ADMIN — INSULIN LISPRO 8 UNITS: 100 INJECTION, SOLUTION INTRAVENOUS; SUBCUTANEOUS at 13:24

## 2018-01-06 RX ADMIN — DEXTROSE MONOHYDRATE 1 MLS/HR: 50 INJECTION, SOLUTION INTRAVENOUS at 08:36

## 2018-01-06 RX ADMIN — CARBIDOPA AND LEVODOPA 1 TAB: 25; 100 TABLET ORAL at 21:22

## 2018-01-06 RX ADMIN — IPRATROPIUM BROMIDE AND ALBUTEROL SULFATE 1 ML: .5; 3 SOLUTION RESPIRATORY (INHALATION) at 13:48

## 2018-01-06 RX ADMIN — SODIUM CHLORIDE 1 ML: 9 INJECTION, SOLUTION INTRAMUSCULAR; INTRAVENOUS; SUBCUTANEOUS at 06:18

## 2018-01-06 RX ADMIN — PROBIOTIC PRODUCT - TAB 1 EA: TAB at 21:22

## 2018-01-06 RX ADMIN — VANCOMYCIN HYDROCHLORIDE 1 MG: KIT at 08:36

## 2018-01-06 RX ADMIN — Medication 1 UNITS: at 06:18

## 2018-01-06 RX ADMIN — ESCITALOPRAM OXALATE 1 MG: 10 TABLET, FILM COATED ORAL at 21:22

## 2018-01-06 RX ADMIN — ALBUTEROL SULFATE 1 MG: 2.5 SOLUTION RESPIRATORY (INHALATION) at 08:00

## 2018-01-06 RX ADMIN — INSULIN LISPRO 10 UNITS: 100 INJECTION, SOLUTION INTRAVENOUS; SUBCUTANEOUS at 17:32

## 2018-01-06 RX ADMIN — BUPROPION HYDROCHLORIDE 1 MG: 150 TABLET, FILM COATED, EXTENDED RELEASE ORAL at 08:33

## 2018-01-06 RX ADMIN — INSULIN LISPRO 1 UNITS: 100 INJECTION, SOLUTION INTRAVENOUS; SUBCUTANEOUS at 21:00

## 2018-01-06 RX ADMIN — MAGNESIUM SULFATE IN DEXTROSE 1 MLS/HR: 10 INJECTION, SOLUTION INTRAVENOUS at 11:05

## 2018-01-06 RX ADMIN — MAGNESIUM SULFATE IN DEXTROSE 1 MLS/HR: 10 INJECTION, SOLUTION INTRAVENOUS at 11:04

## 2018-01-06 RX ADMIN — IPRATROPIUM BROMIDE AND ALBUTEROL SULFATE 1 ML: .5; 3 SOLUTION RESPIRATORY (INHALATION) at 21:40

## 2018-01-07 LAB
ANION GAP: 6 MEQ/L (ref 8–16)
BLOOD UREA NITROGEN: 22 MG/DL (ref 7–18)
CALCIUM LEVEL: 8.8 MG/DL (ref 8.8–10.2)
CARBON DIOXIDE LEVEL: 30 MEQ/L (ref 21–32)
CHLORIDE LEVEL: 108 MEQ/L (ref 98–107)
CREATININE FOR GFR: 1.26 MG/DL (ref 0.7–1.3)
GFR SERPL CREATININE-BSD FRML MDRD: > 60 ML/MIN/{1.73_M2} (ref 49–?)
GLUCOSE BLDC GLUCOMTR-MCNC: 288 MG/DL (ref 80–115)
GLUCOSE BLDC GLUCOMTR-MCNC: 302 MG/DL (ref 80–115)
GLUCOSE, FASTING: 187 MG/DL (ref 80–110)
HEMATOCRIT: 29.1 % (ref 42–52)
HEMOGLOBIN: 9.2 G/DL (ref 14–18)
MAGNESIUM LEVEL: 2 MG/DL (ref 1.8–2.4)
MEAN CORPUSCULAR HEMOGLOBIN: 27.7 PG (ref 27–33)
MEAN CORPUSCULAR HGB CONC: 31.6 G/DL (ref 32–36.5)
MEAN CORPUSCULAR VOLUME: 87.7 FL (ref 80–96)
NRBC BLD AUTO-RTO: 0 % (ref 0–0)
PLATELET COUNT, AUTOMATED: 192 10^3/UL (ref 150–450)
POTASSIUM SERUM: 3.9 MEQ/L (ref 3.5–5.1)
RED BLOOD COUNT: 3.32 10^6/UL (ref 4.3–6.1)
RED CELL DISTRIBUTION WIDTH: 14.2 % (ref 11.5–14.5)
SODIUM LEVEL: 144 MEQ/L (ref 136–145)
VANCOMYCIN LEVEL TROUGH: 7.5 UG/ML (ref 10–20)
WHITE BLOOD COUNT: 8.2 10^3/UL (ref 4–10)

## 2018-01-07 RX ADMIN — CARBIDOPA AND LEVODOPA 1 TAB: 25; 100 TABLET ORAL at 15:17

## 2018-01-07 RX ADMIN — IPRATROPIUM BROMIDE AND ALBUTEROL SULFATE 1 ML: .5; 3 SOLUTION RESPIRATORY (INHALATION) at 02:00

## 2018-01-07 RX ADMIN — INSULIN LISPRO 4 UNITS: 100 INJECTION, SOLUTION INTRAVENOUS; SUBCUTANEOUS at 13:00

## 2018-01-07 RX ADMIN — Medication 1: at 21:00

## 2018-01-07 RX ADMIN — PROBIOTIC PRODUCT - TAB 1 EA: TAB at 21:39

## 2018-01-07 RX ADMIN — IPRATROPIUM BROMIDE AND ALBUTEROL SULFATE 1 ML: .5; 3 SOLUTION RESPIRATORY (INHALATION) at 12:16

## 2018-01-07 RX ADMIN — FORMOTEROL FUMARATE DIHYDRATE 1 MCG: 20 SOLUTION RESPIRATORY (INHALATION) at 07:49

## 2018-01-07 RX ADMIN — CARBIDOPA AND LEVODOPA 1 TAB: 25; 100 TABLET ORAL at 20:54

## 2018-01-07 RX ADMIN — PROBIOTIC PRODUCT - TAB 1 EA: TAB at 09:13

## 2018-01-07 RX ADMIN — BUPROPION HYDROCHLORIDE 1 MG: 150 TABLET, FILM COATED, EXTENDED RELEASE ORAL at 09:13

## 2018-01-07 RX ADMIN — FORMOTEROL FUMARATE DIHYDRATE 1 MCG: 20 SOLUTION RESPIRATORY (INHALATION) at 21:18

## 2018-01-07 RX ADMIN — IPRATROPIUM BROMIDE AND ALBUTEROL SULFATE 1 ML: .5; 3 SOLUTION RESPIRATORY (INHALATION) at 07:49

## 2018-01-07 RX ADMIN — Medication 1 UNITS: at 05:11

## 2018-01-07 RX ADMIN — INSULIN LISPRO 8 UNITS: 100 INJECTION, SOLUTION INTRAVENOUS; SUBCUTANEOUS at 18:18

## 2018-01-07 RX ADMIN — PROBIOTIC PRODUCT - TAB 1 EA: TAB at 15:17

## 2018-01-07 RX ADMIN — BUDESONIDE 1 MG: 0.5 INHALANT RESPIRATORY (INHALATION) at 21:18

## 2018-01-07 RX ADMIN — Medication 1 UNITS: at 18:18

## 2018-01-07 RX ADMIN — ESCITALOPRAM OXALATE 1 MG: 10 TABLET, FILM COATED ORAL at 20:54

## 2018-01-07 RX ADMIN — HYDROCODONE BITARTRATE AND ACETAMINOPHEN 1 TAB: 5; 325 TABLET ORAL at 06:07

## 2018-01-07 RX ADMIN — CARBIDOPA AND LEVODOPA 1 TAB: 25; 100 TABLET ORAL at 09:13

## 2018-01-07 RX ADMIN — IPRATROPIUM BROMIDE AND ALBUTEROL SULFATE 1 ML: .5; 3 SOLUTION RESPIRATORY (INHALATION) at 20:00

## 2018-01-07 RX ADMIN — INSULIN LISPRO 4 UNITS: 100 INJECTION, SOLUTION INTRAVENOUS; SUBCUTANEOUS at 09:14

## 2018-01-07 RX ADMIN — DEXTROSE MONOHYDRATE 1 MG: 50 INJECTION, SOLUTION INTRAVENOUS at 09:13

## 2018-01-07 RX ADMIN — DEXTROSE MONOHYDRATE 1 MLS/HR: 50 INJECTION, SOLUTION INTRAVENOUS at 09:13

## 2018-01-07 RX ADMIN — BUDESONIDE 1 MG: 0.5 INHALANT RESPIRATORY (INHALATION) at 07:49

## 2018-01-07 RX ADMIN — MONTELUKAST SODIUM 1 MG: 10 TABLET, FILM COATED ORAL at 09:13

## 2018-01-07 RX ADMIN — DEXTROSE MONOHYDRATE 1 MLS/HR: 50 INJECTION, SOLUTION INTRAVENOUS at 18:19

## 2018-01-07 RX ADMIN — SODIUM CHLORIDE 1 ML: 9 INJECTION, SOLUTION INTRAMUSCULAR; INTRAVENOUS; SUBCUTANEOUS at 18:18

## 2018-01-07 RX ADMIN — SODIUM CHLORIDE 1 ML: 9 INJECTION, SOLUTION INTRAMUSCULAR; INTRAVENOUS; SUBCUTANEOUS at 05:11

## 2018-01-07 RX ADMIN — INSULIN LISPRO 4 UNITS: 100 INJECTION, SOLUTION INTRAVENOUS; SUBCUTANEOUS at 20:56

## 2018-01-07 RX ADMIN — VANCOMYCIN HYDROCHLORIDE 1 MG: KIT at 09:13

## 2018-01-07 RX ADMIN — VANCOMYCIN HYDROCHLORIDE 1 MG: KIT at 20:54

## 2018-01-08 LAB
ANION GAP: 7 MEQ/L (ref 8–16)
BLOOD UREA NITROGEN: 22 MG/DL (ref 7–18)
CALCIUM LEVEL: 8.8 MG/DL (ref 8.8–10.2)
CARBON DIOXIDE LEVEL: 31 MEQ/L (ref 21–32)
CHLORIDE LEVEL: 106 MEQ/L (ref 98–107)
CREATININE FOR GFR: 1.26 MG/DL (ref 0.7–1.3)
GFR SERPL CREATININE-BSD FRML MDRD: > 60 ML/MIN/{1.73_M2} (ref 49–?)
GLUCOSE BLDC GLUCOMTR-MCNC: 188 MG/DL (ref 80–115)
GLUCOSE BLDC GLUCOMTR-MCNC: 298 MG/DL (ref 80–115)
GLUCOSE BLDC GLUCOMTR-MCNC: 313 MG/DL (ref 80–115)
GLUCOSE, FASTING: 116 MG/DL (ref 80–110)
HEMATOCRIT: 31.1 % (ref 42–52)
HEMOGLOBIN: 9.8 G/DL (ref 14–18)
MAGNESIUM LEVEL: 1.6 MG/DL (ref 1.8–2.4)
MEAN CORPUSCULAR HEMOGLOBIN: 27.6 PG (ref 27–33)
MEAN CORPUSCULAR HGB CONC: 31.5 G/DL (ref 32–36.5)
MEAN CORPUSCULAR VOLUME: 87.6 FL (ref 80–96)
NRBC BLD AUTO-RTO: 0.2 % (ref 0–0)
PLATELET COUNT, AUTOMATED: 229 10^3/UL (ref 150–450)
POTASSIUM SERUM: 4.1 MEQ/L (ref 3.5–5.1)
RED BLOOD COUNT: 3.55 10^6/UL (ref 4.3–6.1)
RED CELL DISTRIBUTION WIDTH: 14.5 % (ref 11.5–14.5)
SODIUM LEVEL: 144 MEQ/L (ref 136–145)
WHITE BLOOD COUNT: 10.6 10^3/UL (ref 4–10)

## 2018-01-08 RX ADMIN — IPRATROPIUM BROMIDE AND ALBUTEROL SULFATE 1 ML: .5; 3 SOLUTION RESPIRATORY (INHALATION) at 02:00

## 2018-01-08 RX ADMIN — DEXTROSE MONOHYDRATE 1 MLS/HR: 50 INJECTION, SOLUTION INTRAVENOUS at 05:50

## 2018-01-08 RX ADMIN — INSULIN LISPRO 8 UNITS: 100 INJECTION, SOLUTION INTRAVENOUS; SUBCUTANEOUS at 18:26

## 2018-01-08 RX ADMIN — PROBIOTIC PRODUCT - TAB 1 EA: TAB at 15:09

## 2018-01-08 RX ADMIN — FORMOTEROL FUMARATE DIHYDRATE 1 MCG: 20 SOLUTION RESPIRATORY (INHALATION) at 07:13

## 2018-01-08 RX ADMIN — SODIUM CHLORIDE 1 ML: 9 INJECTION, SOLUTION INTRAMUSCULAR; INTRAVENOUS; SUBCUTANEOUS at 18:27

## 2018-01-08 RX ADMIN — IPRATROPIUM BROMIDE AND ALBUTEROL SULFATE 1 ML: .5; 3 SOLUTION RESPIRATORY (INHALATION) at 07:13

## 2018-01-08 RX ADMIN — DEXTROSE MONOHYDRATE 1 MLS/HR: 50 INJECTION, SOLUTION INTRAVENOUS at 18:27

## 2018-01-08 RX ADMIN — HYDROCODONE BITARTRATE AND ACETAMINOPHEN 1 TAB: 5; 325 TABLET ORAL at 15:11

## 2018-01-08 RX ADMIN — BUDESONIDE 1 MG: 0.5 INHALANT RESPIRATORY (INHALATION) at 20:00

## 2018-01-08 RX ADMIN — IPRATROPIUM BROMIDE AND ALBUTEROL SULFATE 1 ML: .5; 3 SOLUTION RESPIRATORY (INHALATION) at 20:00

## 2018-01-08 RX ADMIN — VANCOMYCIN HYDROCHLORIDE 1 MG: KIT at 09:30

## 2018-01-08 RX ADMIN — Medication 1 UNITS: at 09:30

## 2018-01-08 RX ADMIN — INSULIN LISPRO 4 UNITS: 100 INJECTION, SOLUTION INTRAVENOUS; SUBCUTANEOUS at 20:59

## 2018-01-08 RX ADMIN — HYDROCODONE BITARTRATE AND ACETAMINOPHEN 1 TAB: 5; 325 TABLET ORAL at 20:57

## 2018-01-08 RX ADMIN — CARBIDOPA AND LEVODOPA 1 TAB: 25; 100 TABLET ORAL at 21:01

## 2018-01-08 RX ADMIN — IPRATROPIUM BROMIDE AND ALBUTEROL SULFATE 1 ML: .5; 3 SOLUTION RESPIRATORY (INHALATION) at 13:00

## 2018-01-08 RX ADMIN — HYDRALAZINE HYDROCHLORIDE 1 MG: 25 TABLET, FILM COATED ORAL at 20:58

## 2018-01-08 RX ADMIN — VANCOMYCIN HYDROCHLORIDE 1 MG: KIT at 20:59

## 2018-01-08 RX ADMIN — MAGNESIUM SULFATE IN DEXTROSE 1 MLS/HR: 10 INJECTION, SOLUTION INTRAVENOUS at 14:00

## 2018-01-08 RX ADMIN — BUPROPION HYDROCHLORIDE 1 MG: 150 TABLET, FILM COATED, EXTENDED RELEASE ORAL at 09:34

## 2018-01-08 RX ADMIN — INSULIN LISPRO 4 UNITS: 100 INJECTION, SOLUTION INTRAVENOUS; SUBCUTANEOUS at 13:59

## 2018-01-08 RX ADMIN — INSULIN DETEMIR 1 UNITS: 100 INJECTION, SOLUTION SUBCUTANEOUS at 13:59

## 2018-01-08 RX ADMIN — CARBIDOPA AND LEVODOPA 1 TAB: 25; 100 TABLET ORAL at 15:12

## 2018-01-08 RX ADMIN — PROBIOTIC PRODUCT - TAB 1 EA: TAB at 09:34

## 2018-01-08 RX ADMIN — MAGNESIUM SULFATE IN DEXTROSE 1 MLS/HR: 10 INJECTION, SOLUTION INTRAVENOUS at 13:59

## 2018-01-08 RX ADMIN — CARBIDOPA AND LEVODOPA 1 TAB: 25; 100 TABLET ORAL at 09:34

## 2018-01-08 RX ADMIN — PROBIOTIC PRODUCT - TAB 1 EA: TAB at 20:57

## 2018-01-08 RX ADMIN — BUDESONIDE 1 MG: 0.5 INHALANT RESPIRATORY (INHALATION) at 07:12

## 2018-01-08 RX ADMIN — Medication 1: at 21:00

## 2018-01-08 RX ADMIN — MONTELUKAST SODIUM 1 MG: 10 TABLET, FILM COATED ORAL at 09:35

## 2018-01-08 RX ADMIN — HYDRALAZINE HYDROCHLORIDE 1 MG: 25 TABLET, FILM COATED ORAL at 09:35

## 2018-01-08 RX ADMIN — SODIUM CHLORIDE 1 ML: 9 INJECTION, SOLUTION INTRAMUSCULAR; INTRAVENOUS; SUBCUTANEOUS at 09:29

## 2018-01-08 RX ADMIN — Medication 1 UNITS: at 18:27

## 2018-01-08 RX ADMIN — DEXTROSE MONOHYDRATE 1 MG: 50 INJECTION, SOLUTION INTRAVENOUS at 09:33

## 2018-01-08 RX ADMIN — HYDRALAZINE HYDROCHLORIDE 1 MG: 25 TABLET, FILM COATED ORAL at 15:11

## 2018-01-08 RX ADMIN — Medication 1 UNITS: at 20:59

## 2018-01-08 RX ADMIN — ESCITALOPRAM OXALATE 1 MG: 10 TABLET, FILM COATED ORAL at 20:55

## 2018-01-08 RX ADMIN — FORMOTEROL FUMARATE DIHYDRATE 1 MCG: 20 SOLUTION RESPIRATORY (INHALATION) at 20:00

## 2018-01-08 RX ADMIN — INSULIN LISPRO 2 UNITS: 100 INJECTION, SOLUTION INTRAVENOUS; SUBCUTANEOUS at 09:34

## 2018-01-09 LAB
ANION GAP: 9 MEQ/L (ref 8–16)
BLOOD UREA NITROGEN: 24 MG/DL (ref 7–18)
CALCIUM LEVEL: 8.6 MG/DL (ref 8.8–10.2)
CARBON DIOXIDE LEVEL: 30 MEQ/L (ref 21–32)
CHLORIDE LEVEL: 103 MEQ/L (ref 98–107)
CREATININE FOR GFR: 1.28 MG/DL (ref 0.7–1.3)
CRP SERPL-MCNC: 4.68 MG/DL (ref 0–0.3)
GFR SERPL CREATININE-BSD FRML MDRD: 59.7 ML/MIN/{1.73_M2} (ref 49–?)
GLUCOSE BLDC GLUCOMTR-MCNC: 148 MG/DL (ref 80–115)
GLUCOSE BLDC GLUCOMTR-MCNC: 232 MG/DL (ref 80–115)
GLUCOSE BLDC GLUCOMTR-MCNC: 263 MG/DL (ref 80–115)
GLUCOSE, FASTING: 110 MG/DL (ref 80–110)
HEMATOCRIT: 31 % (ref 42–52)
HEMOGLOBIN: 9.9 G/DL (ref 14–18)
MAGNESIUM LEVEL: 1.9 MG/DL (ref 1.8–2.4)
MEAN CORPUSCULAR HEMOGLOBIN: 27.9 PG (ref 27–33)
MEAN CORPUSCULAR HGB CONC: 31.9 G/DL (ref 32–36.5)
MEAN CORPUSCULAR VOLUME: 87.3 FL (ref 80–96)
NRBC BLD AUTO-RTO: 0.2 % (ref 0–0)
PLATELET COUNT, AUTOMATED: 210 10^3/UL (ref 150–450)
POTASSIUM SERUM: 3.9 MEQ/L (ref 3.5–5.1)
RED BLOOD COUNT: 3.55 10^6/UL (ref 4.3–6.1)
RED CELL DISTRIBUTION WIDTH: 14.2 % (ref 11.5–14.5)
SODIUM LEVEL: 142 MEQ/L (ref 136–145)
VANCOMYCIN LEVEL TROUGH: 10.6 UG/ML (ref 10–20)
WHITE BLOOD COUNT: 9.5 10^3/UL (ref 4–10)

## 2018-01-09 RX ADMIN — ESCITALOPRAM OXALATE 1 MG: 10 TABLET, FILM COATED ORAL at 20:50

## 2018-01-09 RX ADMIN — CARBIDOPA AND LEVODOPA 1 TAB: 25; 100 TABLET ORAL at 20:49

## 2018-01-09 RX ADMIN — Medication 1 UNITS: at 18:08

## 2018-01-09 RX ADMIN — IPRATROPIUM BROMIDE AND ALBUTEROL SULFATE 1 ML: .5; 3 SOLUTION RESPIRATORY (INHALATION) at 20:00

## 2018-01-09 RX ADMIN — IPRATROPIUM BROMIDE AND ALBUTEROL SULFATE 1 ML: .5; 3 SOLUTION RESPIRATORY (INHALATION) at 02:00

## 2018-01-09 RX ADMIN — PROBIOTIC PRODUCT - TAB 1 EA: TAB at 20:50

## 2018-01-09 RX ADMIN — IPRATROPIUM BROMIDE AND ALBUTEROL SULFATE 1 ML: .5; 3 SOLUTION RESPIRATORY (INHALATION) at 14:53

## 2018-01-09 RX ADMIN — HYDRALAZINE HYDROCHLORIDE 1 MG: 25 TABLET, FILM COATED ORAL at 16:00

## 2018-01-09 RX ADMIN — BUDESONIDE 1 MG: 0.5 INHALANT RESPIRATORY (INHALATION) at 21:51

## 2018-01-09 RX ADMIN — MONTELUKAST SODIUM 1 MG: 10 TABLET, FILM COATED ORAL at 08:43

## 2018-01-09 RX ADMIN — BUDESONIDE 1 MG: 0.5 INHALANT RESPIRATORY (INHALATION) at 07:38

## 2018-01-09 RX ADMIN — Medication 1: at 20:50

## 2018-01-09 RX ADMIN — INSULIN LISPRO 2 UNITS: 100 INJECTION, SOLUTION INTRAVENOUS; SUBCUTANEOUS at 13:38

## 2018-01-09 RX ADMIN — FORMOTEROL FUMARATE DIHYDRATE 1 MCG: 20 SOLUTION RESPIRATORY (INHALATION) at 07:37

## 2018-01-09 RX ADMIN — PROBIOTIC PRODUCT - TAB 1 EA: TAB at 08:43

## 2018-01-09 RX ADMIN — CARBIDOPA AND LEVODOPA 1 TAB: 25; 100 TABLET ORAL at 08:42

## 2018-01-09 RX ADMIN — CARBIDOPA AND LEVODOPA 1 TAB: 25; 100 TABLET ORAL at 16:22

## 2018-01-09 RX ADMIN — DEXTROSE MONOHYDRATE 1 MLS/HR: 50 INJECTION, SOLUTION INTRAVENOUS at 18:08

## 2018-01-09 RX ADMIN — HYDRALAZINE HYDROCHLORIDE 1 MG: 25 TABLET, FILM COATED ORAL at 08:41

## 2018-01-09 RX ADMIN — HYDROCODONE BITARTRATE AND ACETAMINOPHEN 1 TAB: 5; 325 TABLET ORAL at 08:41

## 2018-01-09 RX ADMIN — PROBIOTIC PRODUCT - TAB 1 EA: TAB at 16:22

## 2018-01-09 RX ADMIN — INSULIN LISPRO 2 UNITS: 100 INJECTION, SOLUTION INTRAVENOUS; SUBCUTANEOUS at 20:50

## 2018-01-09 RX ADMIN — INSULIN LISPRO 6 UNITS: 100 INJECTION, SOLUTION INTRAVENOUS; SUBCUTANEOUS at 18:08

## 2018-01-09 RX ADMIN — IPRATROPIUM BROMIDE AND ALBUTEROL SULFATE 1 ML: .5; 3 SOLUTION RESPIRATORY (INHALATION) at 07:41

## 2018-01-09 RX ADMIN — HYDROCODONE BITARTRATE AND ACETAMINOPHEN 1 TAB: 5; 325 TABLET ORAL at 20:49

## 2018-01-09 RX ADMIN — DEXTROSE MONOHYDRATE 1 MG: 50 INJECTION, SOLUTION INTRAVENOUS at 08:43

## 2018-01-09 RX ADMIN — SODIUM CHLORIDE 1 ML: 9 INJECTION, SOLUTION INTRAMUSCULAR; INTRAVENOUS; SUBCUTANEOUS at 05:53

## 2018-01-09 RX ADMIN — VANCOMYCIN HYDROCHLORIDE 1 MG: KIT at 08:43

## 2018-01-09 RX ADMIN — BUPROPION HYDROCHLORIDE 1 MG: 150 TABLET, FILM COATED, EXTENDED RELEASE ORAL at 08:42

## 2018-01-09 RX ADMIN — Medication 1 UNITS: at 05:53

## 2018-01-09 RX ADMIN — INSULIN LISPRO 2 UNITS: 100 INJECTION, SOLUTION INTRAVENOUS; SUBCUTANEOUS at 08:44

## 2018-01-09 RX ADMIN — FORMOTEROL FUMARATE DIHYDRATE 1 MCG: 20 SOLUTION RESPIRATORY (INHALATION) at 21:51

## 2018-01-09 RX ADMIN — INSULIN DETEMIR 1 UNITS: 100 INJECTION, SOLUTION SUBCUTANEOUS at 08:44

## 2018-01-09 RX ADMIN — DEXTROSE MONOHYDRATE 1 MLS/HR: 50 INJECTION, SOLUTION INTRAVENOUS at 05:53

## 2018-01-09 RX ADMIN — HYDRALAZINE HYDROCHLORIDE 1 MG: 25 TABLET, FILM COATED ORAL at 20:50

## 2018-01-09 RX ADMIN — SODIUM CHLORIDE 1 ML: 9 INJECTION, SOLUTION INTRAMUSCULAR; INTRAVENOUS; SUBCUTANEOUS at 18:08

## 2018-01-10 LAB
ADD MANUAL DIFFER: YES
ANION GAP: 8 MEQ/L (ref 8–16)
ANISOCYTOSIS: (no result)
ATYPICAL LYMPH: 3 % (ref 0–5)
BANDS: 3 % (ref ?–11)
BLOOD UREA NITROGEN: 25 MG/DL (ref 7–18)
CALCIUM LEVEL: 8.6 MG/DL (ref 8.8–10.2)
CARBON DIOXIDE LEVEL: 29 MEQ/L (ref 21–32)
CHLORIDE LEVEL: 102 MEQ/L (ref 98–107)
CREATININE FOR GFR: 1.27 MG/DL (ref 0.7–1.3)
DIFF SLIDE NUMBER: 48
EOSINOPHILS: 13 % (ref 0–5)
GFR SERPL CREATININE-BSD FRML MDRD: > 60 ML/MIN/{1.73_M2} (ref 49–?)
GLUCOSE BLDC GLUCOMTR-MCNC: 172 MG/DL (ref 80–115)
GLUCOSE BLDC GLUCOMTR-MCNC: 174 MG/DL (ref 80–115)
GLUCOSE BLDC GLUCOMTR-MCNC: 208 MG/DL (ref 80–115)
GLUCOSE, FASTING: 160 MG/DL (ref 80–110)
HEMATOCRIT: 32.9 % (ref 42–52)
HEMOGLOBIN: 10.6 G/DL (ref 14–18)
LYMPHOCYTES: 17 % (ref 16–52)
MEAN CORPUSCULAR HEMOGLOBIN: 27.7 PG (ref 27–33)
MEAN CORPUSCULAR HGB CONC: 32.2 G/DL (ref 32–36.5)
MEAN CORPUSCULAR VOLUME: 86.1 FL (ref 80–96)
METAMYELOCYTES: 4 % (ref 0–0)
MONOCYTES: 2 % (ref 0–8)
MYELOCYTES: 9 % (ref 0–0)
NEUTROPHILS: 49 % (ref 35–75)
NRBC BLD AUTO-RTO: 0 % (ref 0–0)
PLATELET BLD QL SMEAR: NORMAL
PLATELET COUNT, AUTOMATED: 224 10^3/UL (ref 150–450)
POIKILOCYTOSIS: (no result)
POS COUNT: (no result)
POSITIVE MORPH: (no result)
POTASSIUM SERUM: 3.7 MEQ/L (ref 3.5–5.1)
RED BLOOD COUNT: 3.82 10^6/UL (ref 4.3–6.1)
RED CELL DISTRIBUTION WIDTH: 14.5 % (ref 11.5–14.5)
SODIUM LEVEL: 139 MEQ/L (ref 136–145)
WHITE BLOOD COUNT: 10.1 10^3/UL (ref 4–10)

## 2018-01-10 RX ADMIN — HYDRALAZINE HYDROCHLORIDE 1 MG: 25 TABLET, FILM COATED ORAL at 09:00

## 2018-01-10 RX ADMIN — BUDESONIDE 1 MG: 0.5 INHALANT RESPIRATORY (INHALATION) at 21:35

## 2018-01-10 RX ADMIN — PROBIOTIC PRODUCT - TAB 1 EA: TAB at 16:19

## 2018-01-10 RX ADMIN — INSULIN LISPRO 4 UNITS: 100 INJECTION, SOLUTION INTRAVENOUS; SUBCUTANEOUS at 12:18

## 2018-01-10 RX ADMIN — IPRATROPIUM BROMIDE AND ALBUTEROL SULFATE 1 ML: .5; 3 SOLUTION RESPIRATORY (INHALATION) at 01:34

## 2018-01-10 RX ADMIN — SODIUM CHLORIDE 1 ML: 9 INJECTION, SOLUTION INTRAMUSCULAR; INTRAVENOUS; SUBCUTANEOUS at 05:53

## 2018-01-10 RX ADMIN — BUPROPION HYDROCHLORIDE 1 MG: 150 TABLET, FILM COATED, EXTENDED RELEASE ORAL at 09:10

## 2018-01-10 RX ADMIN — INSULIN DETEMIR 1 UNITS: 100 INJECTION, SOLUTION SUBCUTANEOUS at 09:13

## 2018-01-10 RX ADMIN — DEXTROSE MONOHYDRATE 1 MLS/HR: 50 INJECTION, SOLUTION INTRAVENOUS at 18:05

## 2018-01-10 RX ADMIN — VANCOMYCIN HYDROCHLORIDE 1 MG: KIT at 20:19

## 2018-01-10 RX ADMIN — ACETAMINOPHEN 1 MG: 325 TABLET ORAL at 14:05

## 2018-01-10 RX ADMIN — INSULIN LISPRO 1 UNITS: 100 INJECTION, SOLUTION INTRAVENOUS; SUBCUTANEOUS at 21:00

## 2018-01-10 RX ADMIN — HYDRALAZINE HYDROCHLORIDE 1 MG: 25 TABLET, FILM COATED ORAL at 16:20

## 2018-01-10 RX ADMIN — Medication 1 UNITS: at 05:53

## 2018-01-10 RX ADMIN — DEXTROSE MONOHYDRATE 1 MLS/HR: 50 INJECTION, SOLUTION INTRAVENOUS at 05:54

## 2018-01-10 RX ADMIN — CARBIDOPA AND LEVODOPA 1 TAB: 25; 100 TABLET ORAL at 09:11

## 2018-01-10 RX ADMIN — IPRATROPIUM BROMIDE AND ALBUTEROL SULFATE 1 ML: .5; 3 SOLUTION RESPIRATORY (INHALATION) at 13:43

## 2018-01-10 RX ADMIN — INSULIN LISPRO 4 UNITS: 100 INJECTION, SOLUTION INTRAVENOUS; SUBCUTANEOUS at 18:06

## 2018-01-10 RX ADMIN — INSULIN LISPRO 4 UNITS: 100 INJECTION, SOLUTION INTRAVENOUS; SUBCUTANEOUS at 09:12

## 2018-01-10 RX ADMIN — VANCOMYCIN HYDROCHLORIDE 1 MG: KIT at 01:19

## 2018-01-10 RX ADMIN — MONTELUKAST SODIUM 1 MG: 10 TABLET, FILM COATED ORAL at 09:11

## 2018-01-10 RX ADMIN — SODIUM CHLORIDE 1 ML: 9 INJECTION, SOLUTION INTRAMUSCULAR; INTRAVENOUS; SUBCUTANEOUS at 18:05

## 2018-01-10 RX ADMIN — FORMOTEROL FUMARATE DIHYDRATE 1 MCG: 20 SOLUTION RESPIRATORY (INHALATION) at 07:50

## 2018-01-10 RX ADMIN — CARBIDOPA AND LEVODOPA 1 TAB: 25; 100 TABLET ORAL at 20:19

## 2018-01-10 RX ADMIN — PROBIOTIC PRODUCT - TAB 1 EA: TAB at 09:10

## 2018-01-10 RX ADMIN — Medication 1 UNITS: at 19:23

## 2018-01-10 RX ADMIN — BUDESONIDE 1 MG: 0.5 INHALANT RESPIRATORY (INHALATION) at 07:51

## 2018-01-10 RX ADMIN — VANCOMYCIN HYDROCHLORIDE 1 MG: KIT at 09:12

## 2018-01-10 RX ADMIN — IPRATROPIUM BROMIDE AND ALBUTEROL SULFATE 1 ML: .5; 3 SOLUTION RESPIRATORY (INHALATION) at 07:51

## 2018-01-10 RX ADMIN — Medication 1: at 20:20

## 2018-01-10 RX ADMIN — HYDRALAZINE HYDROCHLORIDE 1 MG: 25 TABLET, FILM COATED ORAL at 20:19

## 2018-01-10 RX ADMIN — HYDROCODONE BITARTRATE AND ACETAMINOPHEN 1 TAB: 5; 325 TABLET ORAL at 12:17

## 2018-01-10 RX ADMIN — IPRATROPIUM BROMIDE AND ALBUTEROL SULFATE 1 ML: .5; 3 SOLUTION RESPIRATORY (INHALATION) at 18:46

## 2018-01-10 RX ADMIN — ESCITALOPRAM OXALATE 1 MG: 10 TABLET, FILM COATED ORAL at 20:19

## 2018-01-10 RX ADMIN — DEXTROSE MONOHYDRATE 1 MG: 50 INJECTION, SOLUTION INTRAVENOUS at 09:11

## 2018-01-10 RX ADMIN — CARBIDOPA AND LEVODOPA 1 TAB: 25; 100 TABLET ORAL at 16:19

## 2018-01-10 RX ADMIN — FORMOTEROL FUMARATE DIHYDRATE 1 MCG: 20 SOLUTION RESPIRATORY (INHALATION) at 21:35

## 2018-01-10 RX ADMIN — PROBIOTIC PRODUCT - TAB 1 EA: TAB at 20:19

## 2018-01-11 LAB
ADD MANUAL DIFFER: YES
ANION GAP: 6 MEQ/L (ref 8–16)
BLOOD UREA NITROGEN: 25 MG/DL (ref 7–18)
CALCIUM LEVEL: 8.3 MG/DL (ref 8.8–10.2)
CARBON DIOXIDE LEVEL: 32 MEQ/L (ref 21–32)
CHLORIDE LEVEL: 103 MEQ/L (ref 98–107)
CREATININE FOR GFR: 1.4 MG/DL (ref 0.7–1.3)
CRP SERPL-MCNC: 7.46 MG/DL (ref 0–0.3)
DIFF SLIDE NUMBER: 38
EOSINOPHILS: 6 % (ref 0–5)
GFR SERPL CREATININE-BSD FRML MDRD: 53.8 ML/MIN/{1.73_M2} (ref 49–?)
GLUCOSE BLDC GLUCOMTR-MCNC: 176 MG/DL (ref 80–115)
GLUCOSE BLDC GLUCOMTR-MCNC: 189 MG/DL (ref 80–115)
GLUCOSE BLDC GLUCOMTR-MCNC: 251 MG/DL (ref 80–115)
GLUCOSE, FASTING: 135 MG/DL (ref 80–110)
HEMATOCRIT: 31.8 % (ref 42–52)
HEMOGLOBIN: 10.3 G/DL (ref 14–18)
HYPOCHROMASIA: (no result)
LYMPHOCYTES: 15 % (ref 16–52)
MEAN CORPUSCULAR HEMOGLOBIN: 28.4 PG (ref 27–33)
MEAN CORPUSCULAR HGB CONC: 32.4 G/DL (ref 32–36.5)
MEAN CORPUSCULAR VOLUME: 87.6 FL (ref 80–96)
METAMYELOCYTES: 2 % (ref 0–0)
MONOCYTES: 11 % (ref 0–8)
MYELOCYTES: 2 % (ref 0–0)
NEUTROPHILS: 64 % (ref 35–75)
NRBC BLD AUTO-RTO: 0 % (ref 0–0)
PLATELET BLD QL SMEAR: NORMAL
PLATELET COUNT, AUTOMATED: 218 10^3/UL (ref 150–450)
POS COUNT: (no result)
POSITIVE MORPH: (no result)
POTASSIUM SERUM: 4.1 MEQ/L (ref 3.5–5.1)
RED BLOOD COUNT: 3.63 10^6/UL (ref 4.3–6.1)
RED CELL DISTRIBUTION WIDTH: 14.3 % (ref 11.5–14.5)
SODIUM LEVEL: 141 MEQ/L (ref 136–145)
VANCOMYCIN LEVEL TROUGH: 16.5 UG/ML (ref 10–20)
WHITE BLOOD COUNT: 11.6 10^3/UL (ref 4–10)

## 2018-01-11 RX ADMIN — FORMOTEROL FUMARATE DIHYDRATE 1 MCG: 20 SOLUTION RESPIRATORY (INHALATION) at 19:43

## 2018-01-11 RX ADMIN — DEXTROSE MONOHYDRATE 1 MG: 50 INJECTION, SOLUTION INTRAVENOUS at 09:53

## 2018-01-11 RX ADMIN — HYDROCODONE BITARTRATE AND ACETAMINOPHEN 1 TAB: 5; 325 TABLET ORAL at 00:28

## 2018-01-11 RX ADMIN — HYDROCODONE BITARTRATE AND ACETAMINOPHEN 1 TAB: 5; 325 TABLET ORAL at 17:22

## 2018-01-11 RX ADMIN — CARBIDOPA AND LEVODOPA 1 TAB: 25; 100 TABLET ORAL at 20:15

## 2018-01-11 RX ADMIN — INSULIN LISPRO 8 UNITS: 100 INJECTION, SOLUTION INTRAVENOUS; SUBCUTANEOUS at 14:18

## 2018-01-11 RX ADMIN — PROBIOTIC PRODUCT - TAB 1 EA: TAB at 20:15

## 2018-01-11 RX ADMIN — Medication 1: at 20:16

## 2018-01-11 RX ADMIN — HYDROCODONE BITARTRATE AND ACETAMINOPHEN 1 TAB: 5; 325 TABLET ORAL at 12:47

## 2018-01-11 RX ADMIN — DEXTROSE MONOHYDRATE 1 MLS/HR: 50 INJECTION, SOLUTION INTRAVENOUS at 05:52

## 2018-01-11 RX ADMIN — Medication 1 UNITS: at 05:52

## 2018-01-11 RX ADMIN — IPRATROPIUM BROMIDE AND ALBUTEROL SULFATE 1 ML: .5; 3 SOLUTION RESPIRATORY (INHALATION) at 02:00

## 2018-01-11 RX ADMIN — BUPROPION HYDROCHLORIDE 1 MG: 150 TABLET, FILM COATED, EXTENDED RELEASE ORAL at 09:53

## 2018-01-11 RX ADMIN — DEXTROSE MONOHYDRATE 1 MLS/HR: 50 INJECTION, SOLUTION INTRAVENOUS at 18:41

## 2018-01-11 RX ADMIN — SODIUM CHLORIDE 1 ML: 9 INJECTION, SOLUTION INTRAMUSCULAR; INTRAVENOUS; SUBCUTANEOUS at 05:52

## 2018-01-11 RX ADMIN — HYDRALAZINE HYDROCHLORIDE 1 MG: 25 TABLET, FILM COATED ORAL at 10:06

## 2018-01-11 RX ADMIN — ESCITALOPRAM OXALATE 1 MG: 10 TABLET, FILM COATED ORAL at 20:16

## 2018-01-11 RX ADMIN — BUDESONIDE 1 MG: 0.5 INHALANT RESPIRATORY (INHALATION) at 19:43

## 2018-01-11 RX ADMIN — MONTELUKAST SODIUM 1 MG: 10 TABLET, FILM COATED ORAL at 09:55

## 2018-01-11 RX ADMIN — SODIUM CHLORIDE 1 ML: 9 INJECTION, SOLUTION INTRAMUSCULAR; INTRAVENOUS; SUBCUTANEOUS at 18:00

## 2018-01-11 RX ADMIN — CARBIDOPA AND LEVODOPA 1 TAB: 25; 100 TABLET ORAL at 16:23

## 2018-01-11 RX ADMIN — IPRATROPIUM BROMIDE AND ALBUTEROL SULFATE 1 ML: .5; 3 SOLUTION RESPIRATORY (INHALATION) at 07:58

## 2018-01-11 RX ADMIN — FORMOTEROL FUMARATE DIHYDRATE 1 MCG: 20 SOLUTION RESPIRATORY (INHALATION) at 07:58

## 2018-01-11 RX ADMIN — INSULIN LISPRO 1 UNITS: 100 INJECTION, SOLUTION INTRAVENOUS; SUBCUTANEOUS at 21:00

## 2018-01-11 RX ADMIN — VANCOMYCIN HYDROCHLORIDE 1 MG: KIT at 20:15

## 2018-01-11 RX ADMIN — INSULIN LISPRO 4 UNITS: 100 INJECTION, SOLUTION INTRAVENOUS; SUBCUTANEOUS at 09:56

## 2018-01-11 RX ADMIN — PROBIOTIC PRODUCT - TAB 1 EA: TAB at 09:54

## 2018-01-11 RX ADMIN — VANCOMYCIN HYDROCHLORIDE 1 MG: KIT at 09:53

## 2018-01-11 RX ADMIN — INSULIN DETEMIR 1 UNITS: 100 INJECTION, SOLUTION SUBCUTANEOUS at 09:55

## 2018-01-11 RX ADMIN — HYDRALAZINE HYDROCHLORIDE 1 MG: 25 TABLET, FILM COATED ORAL at 16:21

## 2018-01-11 RX ADMIN — HYDRALAZINE HYDROCHLORIDE 1 MG: 25 TABLET, FILM COATED ORAL at 20:16

## 2018-01-11 RX ADMIN — INSULIN LISPRO 4 UNITS: 100 INJECTION, SOLUTION INTRAVENOUS; SUBCUTANEOUS at 18:41

## 2018-01-11 RX ADMIN — PROBIOTIC PRODUCT - TAB 1 EA: TAB at 16:23

## 2018-01-11 RX ADMIN — IPRATROPIUM BROMIDE AND ALBUTEROL SULFATE 1 ML: .5; 3 SOLUTION RESPIRATORY (INHALATION) at 19:45

## 2018-01-11 RX ADMIN — BUDESONIDE 1 MG: 0.5 INHALANT RESPIRATORY (INHALATION) at 07:58

## 2018-01-11 RX ADMIN — IPRATROPIUM BROMIDE AND ALBUTEROL SULFATE 1 ML: .5; 3 SOLUTION RESPIRATORY (INHALATION) at 13:12

## 2018-01-11 RX ADMIN — CARBIDOPA AND LEVODOPA 1 TAB: 25; 100 TABLET ORAL at 09:54

## 2018-01-11 RX ADMIN — Medication 1 UNITS: at 20:15

## 2018-01-12 LAB
ADD MANUAL DIFFER: YES
ANION GAP: 7 MEQ/L (ref 8–16)
BLOOD UREA NITROGEN: 27 MG/DL (ref 7–18)
CALCIUM LEVEL: 8.3 MG/DL (ref 8.8–10.2)
CARBON DIOXIDE LEVEL: 31 MEQ/L (ref 21–32)
CHLORIDE LEVEL: 103 MEQ/L (ref 98–107)
CREATININE FOR GFR: 1.65 MG/DL (ref 0.7–1.3)
DIFF SLIDE NUMBER: 20
EOSINOPHILS: 8 % (ref 0–5)
GFR SERPL CREATININE-BSD FRML MDRD: 44.5 ML/MIN/{1.73_M2} (ref 49–?)
GLUCOSE BLDC GLUCOMTR-MCNC: 181 MG/DL (ref 80–115)
GLUCOSE BLDC GLUCOMTR-MCNC: 197 MG/DL (ref 80–115)
GLUCOSE, FASTING: 137 MG/DL (ref 80–110)
HEMATOCRIT: 32.6 % (ref 42–52)
HEMOGLOBIN: 10.3 G/DL (ref 14–18)
LYMPHOCYTES: 23 % (ref 16–52)
MEAN CORPUSCULAR HEMOGLOBIN: 27.5 PG (ref 27–33)
MEAN CORPUSCULAR HGB CONC: 31.6 G/DL (ref 32–36.5)
MEAN CORPUSCULAR VOLUME: 87.2 FL (ref 80–96)
METAMYELOCYTES: 1 % (ref 0–0)
MONOCYTES: 6 % (ref 0–8)
MYELOCYTES: 3 % (ref 0–0)
NEUTROPHILS: 59 % (ref 35–75)
NRBC BLD AUTO-RTO: 0 % (ref 0–0)
PLATELET BLD QL SMEAR: NORMAL
PLATELET COUNT, AUTOMATED: 234 10^3/UL (ref 150–450)
POIKILOCYTOSIS: (no result)
POS COUNT: (no result)
POSITIVE MORPH: (no result)
POTASSIUM SERUM: 4.8 MEQ/L (ref 3.5–5.1)
RED BLOOD COUNT: 3.74 10^6/UL (ref 4.3–6.1)
RED CELL DISTRIBUTION WIDTH: 14.2 % (ref 11.5–14.5)
SODIUM LEVEL: 141 MEQ/L (ref 136–145)
WHITE BLOOD COUNT: 11.8 10^3/UL (ref 4–10)

## 2018-01-12 RX ADMIN — Medication 1: at 21:00

## 2018-01-12 RX ADMIN — DIATRIZOATE MEGLUMINE AND DIATRIZOATE SODIUM 1 ML: 600; 100 SOLUTION ORAL; RECTAL at 10:19

## 2018-01-12 RX ADMIN — BUDESONIDE 1 MG: 0.5 INHALANT RESPIRATORY (INHALATION) at 20:18

## 2018-01-12 RX ADMIN — IPRATROPIUM BROMIDE AND ALBUTEROL SULFATE 1 ML: .5; 3 SOLUTION RESPIRATORY (INHALATION) at 13:36

## 2018-01-12 RX ADMIN — BUDESONIDE 1 MG: 0.5 INHALANT RESPIRATORY (INHALATION) at 07:19

## 2018-01-12 RX ADMIN — PROBIOTIC PRODUCT - TAB 1 EA: TAB at 08:31

## 2018-01-12 RX ADMIN — SODIUM CHLORIDE 1 MLS/HR: 9 INJECTION, SOLUTION INTRAVENOUS at 10:17

## 2018-01-12 RX ADMIN — INSULIN LISPRO 1 UNITS: 100 INJECTION, SOLUTION INTRAVENOUS; SUBCUTANEOUS at 21:00

## 2018-01-12 RX ADMIN — Medication 1 UNITS: at 21:30

## 2018-01-12 RX ADMIN — DIATRIZOATE MEGLUMINE AND DIATRIZOATE SODIUM 1 ML: 600; 100 SOLUTION ORAL; RECTAL at 10:18

## 2018-01-12 RX ADMIN — HYDROCODONE BITARTRATE AND ACETAMINOPHEN 1 TAB: 5; 325 TABLET ORAL at 15:52

## 2018-01-12 RX ADMIN — CARBIDOPA AND LEVODOPA 1 TAB: 25; 100 TABLET ORAL at 15:51

## 2018-01-12 RX ADMIN — INSULIN DETEMIR 1 UNITS: 100 INJECTION, SOLUTION SUBCUTANEOUS at 08:33

## 2018-01-12 RX ADMIN — DEXTROSE MONOHYDRATE 1 MLS/HR: 50 INJECTION, SOLUTION INTRAVENOUS at 05:48

## 2018-01-12 RX ADMIN — IPRATROPIUM BROMIDE AND ALBUTEROL SULFATE 1 ML: .5; 3 SOLUTION RESPIRATORY (INHALATION) at 01:49

## 2018-01-12 RX ADMIN — MONTELUKAST SODIUM 1 MG: 10 TABLET, FILM COATED ORAL at 08:31

## 2018-01-12 RX ADMIN — IPRATROPIUM BROMIDE AND ALBUTEROL SULFATE 1 ML: .5; 3 SOLUTION RESPIRATORY (INHALATION) at 20:00

## 2018-01-12 RX ADMIN — INSULIN LISPRO 2 UNITS: 100 INJECTION, SOLUTION INTRAVENOUS; SUBCUTANEOUS at 08:32

## 2018-01-12 RX ADMIN — DEXTROSE MONOHYDRATE 1 MLS/HR: 50 INJECTION, SOLUTION INTRAVENOUS at 17:47

## 2018-01-12 RX ADMIN — ESCITALOPRAM OXALATE 1 MG: 10 TABLET, FILM COATED ORAL at 21:35

## 2018-01-12 RX ADMIN — HYDRALAZINE HYDROCHLORIDE 1 MG: 25 TABLET, FILM COATED ORAL at 21:00

## 2018-01-12 RX ADMIN — HYDRALAZINE HYDROCHLORIDE 1 MG: 25 TABLET, FILM COATED ORAL at 15:52

## 2018-01-12 RX ADMIN — VANCOMYCIN HYDROCHLORIDE 1 MG: KIT at 21:34

## 2018-01-12 RX ADMIN — HYDROCODONE BITARTRATE AND ACETAMINOPHEN 1 TAB: 5; 325 TABLET ORAL at 08:51

## 2018-01-12 RX ADMIN — FORMOTEROL FUMARATE DIHYDRATE 1 MCG: 20 SOLUTION RESPIRATORY (INHALATION) at 20:18

## 2018-01-12 RX ADMIN — VANCOMYCIN HYDROCHLORIDE 1 MG: KIT at 08:32

## 2018-01-12 RX ADMIN — SODIUM CHLORIDE 1 ML: 9 INJECTION, SOLUTION INTRAMUSCULAR; INTRAVENOUS; SUBCUTANEOUS at 05:48

## 2018-01-12 RX ADMIN — HYDROCODONE BITARTRATE AND ACETAMINOPHEN 1 TAB: 5; 325 TABLET ORAL at 21:35

## 2018-01-12 RX ADMIN — SODIUM CHLORIDE 1 ML: 9 INJECTION, SOLUTION INTRAMUSCULAR; INTRAVENOUS; SUBCUTANEOUS at 21:30

## 2018-01-12 RX ADMIN — HYDRALAZINE HYDROCHLORIDE 1 MG: 25 TABLET, FILM COATED ORAL at 08:30

## 2018-01-12 RX ADMIN — SODIUM CHLORIDE 1 ML: 9 INJECTION, SOLUTION INTRAMUSCULAR; INTRAVENOUS; SUBCUTANEOUS at 08:31

## 2018-01-12 RX ADMIN — DEXTROSE MONOHYDRATE 1 MG: 50 INJECTION, SOLUTION INTRAVENOUS at 08:31

## 2018-01-12 RX ADMIN — FORMOTEROL FUMARATE DIHYDRATE 1 MCG: 20 SOLUTION RESPIRATORY (INHALATION) at 07:19

## 2018-01-12 RX ADMIN — PROBIOTIC PRODUCT - TAB 1 EA: TAB at 21:36

## 2018-01-12 RX ADMIN — BUPROPION HYDROCHLORIDE 1 MG: 150 TABLET, FILM COATED, EXTENDED RELEASE ORAL at 08:31

## 2018-01-12 RX ADMIN — IPRATROPIUM BROMIDE AND ALBUTEROL SULFATE 1 ML: .5; 3 SOLUTION RESPIRATORY (INHALATION) at 07:20

## 2018-01-12 RX ADMIN — INSULIN LISPRO 4 UNITS: 100 INJECTION, SOLUTION INTRAVENOUS; SUBCUTANEOUS at 12:19

## 2018-01-12 RX ADMIN — Medication 1 UNITS: at 08:31

## 2018-01-12 RX ADMIN — INSULIN LISPRO 4 UNITS: 100 INJECTION, SOLUTION INTRAVENOUS; SUBCUTANEOUS at 17:48

## 2018-01-12 RX ADMIN — Medication 1 UNITS: at 05:48

## 2018-01-12 RX ADMIN — PROBIOTIC PRODUCT - TAB 1 EA: TAB at 15:51

## 2018-01-12 RX ADMIN — CARBIDOPA AND LEVODOPA 1 TAB: 25; 100 TABLET ORAL at 21:36

## 2018-01-12 RX ADMIN — CARBIDOPA AND LEVODOPA 1 TAB: 25; 100 TABLET ORAL at 08:31

## 2018-01-13 LAB
ADD MANUAL DIFFER: YES
ANION GAP: 6 MEQ/L (ref 8–16)
BANDS: 1 % (ref ?–11)
BLOOD UREA NITROGEN: 24 MG/DL (ref 7–18)
CALCIUM LEVEL: 8.2 MG/DL (ref 8.8–10.2)
CARBON DIOXIDE LEVEL: 27 MEQ/L (ref 21–32)
CHLORIDE LEVEL: 105 MEQ/L (ref 98–107)
CREATININE FOR GFR: 1.45 MG/DL (ref 0.7–1.3)
CRP SERPL-MCNC: 8.87 MG/DL (ref 0–0.3)
DIFF SLIDE NUMBER: 14
EOSINOPHILS: 6 % (ref 0–5)
GFR SERPL CREATININE-BSD FRML MDRD: 51.7 ML/MIN/{1.73_M2} (ref 49–?)
GLUCOSE BLDC GLUCOMTR-MCNC: 134 MG/DL (ref 80–115)
GLUCOSE BLDC GLUCOMTR-MCNC: 167 MG/DL (ref 80–115)
GLUCOSE BLDC GLUCOMTR-MCNC: 178 MG/DL (ref 80–115)
GLUCOSE BLDC GLUCOMTR-MCNC: 228 MG/DL (ref 80–115)
GLUCOSE, FASTING: 138 MG/DL (ref 80–110)
HEMATOCRIT: 31.7 % (ref 42–52)
HEMOGLOBIN: 10.1 G/DL (ref 14–18)
LYMPHOCYTES: 15 % (ref 16–52)
MEAN CORPUSCULAR HEMOGLOBIN: 27.9 PG (ref 27–33)
MEAN CORPUSCULAR HGB CONC: 31.9 G/DL (ref 32–36.5)
MEAN CORPUSCULAR VOLUME: 87.6 FL (ref 80–96)
METAMYELOCYTES: 4 % (ref 0–0)
MONOCYTES: 7 % (ref 0–8)
MYELOCYTES: 3 % (ref 0–0)
NEUTROPHILS: 64 % (ref 35–75)
NRBC BLD AUTO-RTO: 0 % (ref 0–0)
PLATELET BLD QL SMEAR: NORMAL
PLATELET COUNT, AUTOMATED: 225 10^3/UL (ref 150–450)
POS COUNT: (no result)
POSITIVE MORPH: (no result)
POTASSIUM SERUM: 4.2 MEQ/L (ref 3.5–5.1)
RBC MORPHOLOGY: NORMAL
RED BLOOD COUNT: 3.62 10^6/UL (ref 4.3–6.1)
RED CELL DISTRIBUTION WIDTH: 13.7 % (ref 11.5–14.5)
SODIUM LEVEL: 138 MEQ/L (ref 136–145)
VANCOMYCIN LEVEL TROUGH: 20.4 UG/ML (ref 10–20)
VANCOMYCIN LEVEL TROUGH: 40 UG/ML (ref 10–20)
WHITE BLOOD COUNT: 10.7 10^3/UL (ref 4–10)

## 2018-01-13 RX ADMIN — DEXTROSE MONOHYDRATE 1 MLS/HR: 50 INJECTION, SOLUTION INTRAVENOUS at 05:20

## 2018-01-13 RX ADMIN — BUPROPION HYDROCHLORIDE 1 MG: 150 TABLET, FILM COATED, EXTENDED RELEASE ORAL at 09:34

## 2018-01-13 RX ADMIN — CARBIDOPA AND LEVODOPA 1 TAB: 25; 100 TABLET ORAL at 09:34

## 2018-01-13 RX ADMIN — VANCOMYCIN HYDROCHLORIDE 1 MG: KIT at 09:54

## 2018-01-13 RX ADMIN — PROBIOTIC PRODUCT - TAB 1 EA: TAB at 17:06

## 2018-01-13 RX ADMIN — CARBIDOPA AND LEVODOPA 1 TAB: 25; 100 TABLET ORAL at 20:51

## 2018-01-13 RX ADMIN — FORMOTEROL FUMARATE DIHYDRATE 1 MCG: 20 SOLUTION RESPIRATORY (INHALATION) at 07:57

## 2018-01-13 RX ADMIN — SODIUM CHLORIDE 1 MLS/HR: 9 INJECTION, SOLUTION INTRAVENOUS at 22:13

## 2018-01-13 RX ADMIN — HYDRALAZINE HYDROCHLORIDE 1 MG: 25 TABLET, FILM COATED ORAL at 21:00

## 2018-01-13 RX ADMIN — CARBIDOPA AND LEVODOPA 1 TAB: 25; 100 TABLET ORAL at 17:06

## 2018-01-13 RX ADMIN — SODIUM CHLORIDE 1 ML: 9 INJECTION, SOLUTION INTRAMUSCULAR; INTRAVENOUS; SUBCUTANEOUS at 05:20

## 2018-01-13 RX ADMIN — HYDRALAZINE HYDROCHLORIDE 1 MG: 25 TABLET, FILM COATED ORAL at 16:00

## 2018-01-13 RX ADMIN — INSULIN LISPRO 4 UNITS: 100 INJECTION, SOLUTION INTRAVENOUS; SUBCUTANEOUS at 12:56

## 2018-01-13 RX ADMIN — HYDRALAZINE HYDROCHLORIDE 1 MG: 25 TABLET, FILM COATED ORAL at 09:00

## 2018-01-13 RX ADMIN — MONTELUKAST SODIUM 1 MG: 10 TABLET, FILM COATED ORAL at 09:34

## 2018-01-13 RX ADMIN — INSULIN LISPRO 1 UNITS: 100 INJECTION, SOLUTION INTRAVENOUS; SUBCUTANEOUS at 17:30

## 2018-01-13 RX ADMIN — Medication 1 UNITS: at 05:20

## 2018-01-13 RX ADMIN — PROBIOTIC PRODUCT - TAB 1 EA: TAB at 20:51

## 2018-01-13 RX ADMIN — IPRATROPIUM BROMIDE AND ALBUTEROL SULFATE 1 ML: .5; 3 SOLUTION RESPIRATORY (INHALATION) at 01:44

## 2018-01-13 RX ADMIN — FORMOTEROL FUMARATE DIHYDRATE 1 MCG: 20 SOLUTION RESPIRATORY (INHALATION) at 19:56

## 2018-01-13 RX ADMIN — INSULIN DETEMIR 1 UNITS: 100 INJECTION, SOLUTION SUBCUTANEOUS at 09:37

## 2018-01-13 RX ADMIN — SODIUM CHLORIDE 1 ML: 9 INJECTION, SOLUTION INTRAMUSCULAR; INTRAVENOUS; SUBCUTANEOUS at 17:06

## 2018-01-13 RX ADMIN — PROBIOTIC PRODUCT - TAB 1 EA: TAB at 09:34

## 2018-01-13 RX ADMIN — ESCITALOPRAM OXALATE 1 MG: 10 TABLET, FILM COATED ORAL at 20:51

## 2018-01-13 RX ADMIN — IPRATROPIUM BROMIDE AND ALBUTEROL SULFATE 1 ML: .5; 3 SOLUTION RESPIRATORY (INHALATION) at 19:56

## 2018-01-13 RX ADMIN — IPRATROPIUM BROMIDE AND ALBUTEROL SULFATE 1 ML: .5; 3 SOLUTION RESPIRATORY (INHALATION) at 14:14

## 2018-01-13 RX ADMIN — VANCOMYCIN HYDROCHLORIDE 1 MG: KIT at 20:51

## 2018-01-13 RX ADMIN — HYDROCODONE BITARTRATE AND ACETAMINOPHEN 1 TAB: 5; 325 TABLET ORAL at 20:52

## 2018-01-13 RX ADMIN — INSULIN LISPRO 1 UNITS: 100 INJECTION, SOLUTION INTRAVENOUS; SUBCUTANEOUS at 21:00

## 2018-01-13 RX ADMIN — BUDESONIDE 1 MG: 0.5 INHALANT RESPIRATORY (INHALATION) at 19:56

## 2018-01-13 RX ADMIN — INSULIN LISPRO 2 UNITS: 100 INJECTION, SOLUTION INTRAVENOUS; SUBCUTANEOUS at 09:34

## 2018-01-13 RX ADMIN — IPRATROPIUM BROMIDE AND ALBUTEROL SULFATE 1 ML: .5; 3 SOLUTION RESPIRATORY (INHALATION) at 07:57

## 2018-01-13 RX ADMIN — Medication 1 UNITS: at 17:06

## 2018-01-13 RX ADMIN — SODIUM CHLORIDE 1 MLS/HR: 9 INJECTION, SOLUTION INTRAVENOUS at 09:54

## 2018-01-13 RX ADMIN — HYDROCODONE BITARTRATE AND ACETAMINOPHEN 1 TAB: 5; 325 TABLET ORAL at 09:55

## 2018-01-13 RX ADMIN — BUDESONIDE 1 MG: 0.5 INHALANT RESPIRATORY (INHALATION) at 07:57

## 2018-01-13 RX ADMIN — Medication 1: at 21:00

## 2018-01-13 RX ADMIN — DEXTROSE MONOHYDRATE 1 MG: 50 INJECTION, SOLUTION INTRAVENOUS at 09:34

## 2018-01-14 LAB
ANION GAP: 7 MEQ/L (ref 8–16)
BASO #: 0.1 10^3/UL (ref 0–0.2)
BASO %: 0.5 % (ref 0–1)
BLOOD UREA NITROGEN: 23 MG/DL (ref 7–18)
CALCIUM LEVEL: 8.2 MG/DL (ref 8.8–10.2)
CARBON DIOXIDE LEVEL: 27 MEQ/L (ref 21–32)
CHLORIDE LEVEL: 107 MEQ/L (ref 98–107)
CREATININE FOR GFR: 1.53 MG/DL (ref 0.7–1.3)
EOS #: 0.5 10^3/UL (ref 0–0.5)
EOSINOPHIL NFR BLD AUTO: 4.9 % (ref 0–3)
GFR SERPL CREATININE-BSD FRML MDRD: 48.6 ML/MIN/{1.73_M2} (ref 49–?)
GLUCOSE BLDC GLUCOMTR-MCNC: 157 MG/DL (ref 80–115)
GLUCOSE, FASTING: 144 MG/DL (ref 80–110)
HEMATOCRIT: 32.2 % (ref 42–52)
HEMOGLOBIN: 10.1 G/DL (ref 14–18)
IMMATURE GRANULOCYTE #: 0.3 10^3/UL (ref 0–0)
IMMATURE GRANULOCYTE %: 2.9 % (ref 0–0)
LYMPH #: 1.9 10^3/UL (ref 1.5–4.5)
LYMPH %: 17 % (ref 24–44)
MEAN CORPUSCULAR HEMOGLOBIN: 27.2 PG (ref 27–33)
MEAN CORPUSCULAR HGB CONC: 31.4 G/DL (ref 32–36.5)
MEAN CORPUSCULAR VOLUME: 86.8 FL (ref 80–96)
MONO #: 0.8 10^3/UL (ref 0–0.8)
MONO %: 6.8 % (ref 0–5)
NEUTROPHILS #: 7.4 10^3/UL (ref 1.8–7.7)
NEUTROPHILS %: 67.9 % (ref 36–66)
NRBC BLD AUTO-RTO: 0 % (ref 0–0)
PLATELET COUNT, AUTOMATED: 253 10^3/UL (ref 150–450)
POTASSIUM SERUM: 4.5 MEQ/L (ref 3.5–5.1)
RED BLOOD COUNT: 3.71 10^6/UL (ref 4.3–6.1)
RED CELL DISTRIBUTION WIDTH: 13.7 % (ref 11.5–14.5)
SODIUM LEVEL: 141 MEQ/L (ref 136–145)
WHITE BLOOD COUNT: 11 10^3/UL (ref 4–10)

## 2018-01-14 RX ADMIN — VANCOMYCIN HYDROCHLORIDE 1 MG: KIT at 08:07

## 2018-01-14 RX ADMIN — HYDROCODONE BITARTRATE AND ACETAMINOPHEN 1 TAB: 5; 325 TABLET ORAL at 04:28

## 2018-01-14 RX ADMIN — CARBIDOPA AND LEVODOPA 1 TAB: 25; 100 TABLET ORAL at 08:06

## 2018-01-14 RX ADMIN — BUPROPION HYDROCHLORIDE 1 MG: 150 TABLET, FILM COATED, EXTENDED RELEASE ORAL at 08:06

## 2018-01-14 RX ADMIN — MONTELUKAST SODIUM 1 MG: 10 TABLET, FILM COATED ORAL at 08:06

## 2018-01-14 RX ADMIN — IPRATROPIUM BROMIDE AND ALBUTEROL SULFATE 1 ML: .5; 3 SOLUTION RESPIRATORY (INHALATION) at 13:12

## 2018-01-14 RX ADMIN — BUDESONIDE 1 MG: 0.5 INHALANT RESPIRATORY (INHALATION) at 07:38

## 2018-01-14 RX ADMIN — INSULIN LISPRO 1 UNITS: 100 INJECTION, SOLUTION INTRAVENOUS; SUBCUTANEOUS at 12:00

## 2018-01-14 RX ADMIN — SODIUM CHLORIDE 1 ML: 9 INJECTION, SOLUTION INTRAMUSCULAR; INTRAVENOUS; SUBCUTANEOUS at 06:00

## 2018-01-14 RX ADMIN — HYDROCODONE BITARTRATE AND ACETAMINOPHEN 1 TAB: 5; 325 TABLET ORAL at 10:12

## 2018-01-14 RX ADMIN — IPRATROPIUM BROMIDE AND ALBUTEROL SULFATE 1 ML: .5; 3 SOLUTION RESPIRATORY (INHALATION) at 02:00

## 2018-01-14 RX ADMIN — DEXTROSE MONOHYDRATE 1 MG: 50 INJECTION, SOLUTION INTRAVENOUS at 08:07

## 2018-01-14 RX ADMIN — Medication 1 UNITS: at 06:00

## 2018-01-14 RX ADMIN — HYDRALAZINE HYDROCHLORIDE 1 MG: 25 TABLET, FILM COATED ORAL at 08:07

## 2018-01-14 RX ADMIN — INSULIN LISPRO 2 UNITS: 100 INJECTION, SOLUTION INTRAVENOUS; SUBCUTANEOUS at 08:07

## 2018-01-14 RX ADMIN — IPRATROPIUM BROMIDE AND ALBUTEROL SULFATE 1 ML: .5; 3 SOLUTION RESPIRATORY (INHALATION) at 07:43

## 2018-01-14 RX ADMIN — FORMOTEROL FUMARATE DIHYDRATE 1 MCG: 20 SOLUTION RESPIRATORY (INHALATION) at 07:38

## 2018-01-14 RX ADMIN — INSULIN DETEMIR 1 UNITS: 100 INJECTION, SOLUTION SUBCUTANEOUS at 08:09

## 2018-01-14 RX ADMIN — PROBIOTIC PRODUCT - TAB 1 EA: TAB at 08:06

## 2018-01-14 RX ADMIN — DEXTROSE MONOHYDRATE 1 MLS/HR: 50 INJECTION, SOLUTION INTRAVENOUS at 05:40

## 2018-01-18 ENCOUNTER — HOSPITAL ENCOUNTER (OUTPATIENT)
Dept: HOSPITAL 53 - M LAB REF | Age: 68
End: 2018-01-18
Attending: INTERNAL MEDICINE
Payer: MEDICARE

## 2018-01-18 DIAGNOSIS — D64.9: Primary | ICD-10-CM

## 2018-01-18 LAB
FERRITIN: 118 NG/ML (ref 26–388)
FOLATE SERPL-MCNC: 5.7 NG/ML
IRON (FE): 26 UG/DL (ref 65–175)
IRON SATN MFR SERPL: 8.6 % (ref 19.7–50)
TOTAL IRON BINDING CAPACITY: 303 UG/DL (ref 250–450)
VIT B12 SERPL-MCNC: 898 PG/ML

## 2018-01-18 PROCEDURE — 82746 ASSAY OF FOLIC ACID SERUM: CPT

## 2018-01-19 ENCOUNTER — HOSPITAL ENCOUNTER (OUTPATIENT)
Dept: HOSPITAL 53 - M SHH | Age: 68
End: 2018-01-19
Attending: INTERNAL MEDICINE
Payer: MEDICARE

## 2018-01-19 DIAGNOSIS — N28.89: Primary | ICD-10-CM

## 2018-01-19 DIAGNOSIS — A49.02: ICD-10-CM

## 2018-01-19 LAB
ALBUMIN/GLOBULIN RATIO: 0.83 (ref 1–1.93)
ALBUMIN: 3 GM/DL (ref 3.2–5.2)
ALKALINE PHOSPHATASE: 70 U/L (ref 45–117)
ALT SERPL W P-5'-P-CCNC: 6 U/L (ref 12–78)
ANION GAP: 9 MEQ/L (ref 8–16)
AST SERPL-CCNC: 12 U/L (ref 7–37)
BASO #: 0.1 10^3/UL (ref 0–0.2)
BASO %: 0.7 % (ref 0–1)
BILIRUBIN,TOTAL: 0.4 MG/DL (ref 0.2–1)
BLOOD UREA NITROGEN: 20 MG/DL (ref 7–18)
CALCIUM LEVEL: 8.6 MG/DL (ref 8.8–10.2)
CARBON DIOXIDE LEVEL: 27 MEQ/L (ref 21–32)
CHLORIDE LEVEL: 104 MEQ/L (ref 98–107)
CREATININE FOR GFR: 1.46 MG/DL (ref 0.7–1.3)
CRP SERPL-MCNC: 7.24 MG/DL (ref 0–0.3)
EOS #: 0.4 10^3/UL (ref 0–0.5)
EOSINOPHIL NFR BLD AUTO: 4.4 % (ref 0–3)
ERYTHROCYTE SEDIMENTATION RATE: 65 MM/HR (ref 0–20)
GFR SERPL CREATININE-BSD FRML MDRD: 51.3 ML/MIN/{1.73_M2} (ref 49–?)
GLUCOSE, FASTING: 179 MG/DL (ref 80–110)
HEMATOCRIT: 34 % (ref 42–52)
HEMOGLOBIN: 10.7 G/DL (ref 14–18)
IMMATURE GRANULOCYTE #: 0.1 10^3/UL (ref 0–0)
IMMATURE GRANULOCYTE %: 0.8 % (ref 0–0)
LYMPH #: 1.5 10^3/UL (ref 1.5–4.5)
LYMPH %: 16.2 % (ref 24–44)
MEAN CORPUSCULAR HEMOGLOBIN: 27.6 PG (ref 27–33)
MEAN CORPUSCULAR HGB CONC: 31.5 G/DL (ref 32–36.5)
MEAN CORPUSCULAR VOLUME: 87.6 FL (ref 80–96)
MONO #: 0.5 10^3/UL (ref 0–0.8)
MONO %: 5.8 % (ref 0–5)
NEUTROPHILS #: 6.5 10^3/UL (ref 1.8–7.7)
NEUTROPHILS %: 72.1 % (ref 36–66)
NRBC BLD AUTO-RTO: 0 % (ref 0–0)
PLATELET COUNT, AUTOMATED: 327 10^3/UL (ref 150–450)
POTASSIUM SERUM: 4.4 MEQ/L (ref 3.5–5.1)
RED BLOOD COUNT: 3.88 10^6/UL (ref 4.3–6.1)
RED CELL DISTRIBUTION WIDTH: 13.7 % (ref 11.5–14.5)
SODIUM LEVEL: 140 MEQ/L (ref 136–145)
TOTAL PROTEIN: 6.6 GM/DL (ref 6.4–8.2)
VANCOMYCIN LEVEL TROUGH: 8.9 UG/ML (ref 10–20)
WHITE BLOOD COUNT: 9 10^3/UL (ref 4–10)

## 2018-01-19 PROCEDURE — 80053 COMPREHEN METABOLIC PANEL: CPT

## 2018-01-22 ENCOUNTER — HOSPITAL ENCOUNTER (OUTPATIENT)
Dept: HOSPITAL 53 - M SHH | Age: 68
End: 2018-01-22
Attending: INTERNAL MEDICINE
Payer: MEDICARE

## 2018-01-22 DIAGNOSIS — A04.72: Primary | ICD-10-CM

## 2018-01-22 DIAGNOSIS — A49.02: ICD-10-CM

## 2018-01-22 LAB
ALBUMIN/GLOBULIN RATIO: 0.82 (ref 1–1.93)
ALBUMIN: 2.8 GM/DL (ref 3.2–5.2)
ALKALINE PHOSPHATASE: 70 U/L (ref 45–117)
ALT SERPL W P-5'-P-CCNC: 10 U/L (ref 12–78)
ANION GAP: 11 MEQ/L (ref 8–16)
AST SERPL-CCNC: 8 U/L (ref 7–37)
BASO #: 0.1 10^3/UL (ref 0–0.2)
BASO %: 0.6 % (ref 0–1)
BILIRUBIN,TOTAL: 0.4 MG/DL (ref 0.2–1)
BLOOD UREA NITROGEN: 23 MG/DL (ref 7–18)
CALCIUM LEVEL: 8.5 MG/DL (ref 8.8–10.2)
CARBON DIOXIDE LEVEL: 25 MEQ/L (ref 21–32)
CHLORIDE LEVEL: 101 MEQ/L (ref 98–107)
CREATININE FOR GFR: 1.54 MG/DL (ref 0.7–1.3)
CRP SERPL-MCNC: 17.2 MG/DL (ref 0–0.3)
EOS #: 0.3 10^3/UL (ref 0–0.5)
EOSINOPHIL NFR BLD AUTO: 3.5 % (ref 0–3)
ERYTHROCYTE SEDIMENTATION RATE: > 140 MM/HR (ref 0–20)
GFR SERPL CREATININE-BSD FRML MDRD: 48.2 ML/MIN/{1.73_M2} (ref 49–?)
GLUCOSE, FASTING: 198 MG/DL (ref 70–100)
HEMATOCRIT: 32.2 % (ref 42–52)
HEMOGLOBIN: 10.3 G/DL (ref 14–18)
IMMATURE GRANULOCYTE #: 0 10^3/UL (ref 0–0)
IMMATURE GRANULOCYTE %: 0.3 % (ref 0–0)
LYMPH #: 1.2 10^3/UL (ref 1.5–4.5)
LYMPH %: 13.2 % (ref 24–44)
MEAN CORPUSCULAR HEMOGLOBIN: 27.3 PG (ref 27–33)
MEAN CORPUSCULAR HGB CONC: 32 G/DL (ref 32–36.5)
MEAN CORPUSCULAR VOLUME: 85.4 FL (ref 80–96)
MONO #: 0.5 10^3/UL (ref 0–0.8)
MONO %: 5.7 % (ref 0–5)
NEUTROPHILS #: 6.9 10^3/UL (ref 1.8–7.7)
NEUTROPHILS %: 76.7 % (ref 36–66)
NRBC BLD AUTO-RTO: 0 % (ref 0–0)
PLATELET COUNT, AUTOMATED: 294 10^3/UL (ref 150–450)
POTASSIUM SERUM: 4.1 MEQ/L (ref 3.5–5.1)
RED BLOOD COUNT: 3.77 10^6/UL (ref 4.3–6.1)
RED CELL DISTRIBUTION WIDTH: 13.3 % (ref 11.5–14.5)
SODIUM LEVEL: 137 MEQ/L (ref 136–145)
TOTAL PROTEIN: 6.2 GM/DL (ref 6.4–8.2)
VANCOMYCIN LEVEL TROUGH: 8.9 UG/ML (ref 10–20)
WHITE BLOOD COUNT: 8.9 10^3/UL (ref 4–10)

## 2018-01-22 PROCEDURE — 80053 COMPREHEN METABOLIC PANEL: CPT

## 2018-01-26 ENCOUNTER — HOSPITAL ENCOUNTER (OUTPATIENT)
Dept: HOSPITAL 53 - M RAD | Age: 68
End: 2018-01-26
Attending: INTERNAL MEDICINE
Payer: MEDICARE

## 2018-01-26 DIAGNOSIS — R93.5: Primary | ICD-10-CM

## 2018-01-26 DIAGNOSIS — A49.02: ICD-10-CM

## 2018-02-01 ENCOUNTER — HOSPITAL ENCOUNTER (OUTPATIENT)
Dept: HOSPITAL 53 - M INFU | Age: 68
Discharge: HOME | End: 2018-02-01
Attending: INTERNAL MEDICINE
Payer: MEDICARE

## 2018-02-01 DIAGNOSIS — F32.9: ICD-10-CM

## 2018-02-01 DIAGNOSIS — E11.9: ICD-10-CM

## 2018-02-01 DIAGNOSIS — G20: ICD-10-CM

## 2018-02-01 DIAGNOSIS — G47.33: ICD-10-CM

## 2018-02-01 DIAGNOSIS — Z88.8: ICD-10-CM

## 2018-02-01 DIAGNOSIS — J44.9: ICD-10-CM

## 2018-02-01 DIAGNOSIS — Z79.891: ICD-10-CM

## 2018-02-01 DIAGNOSIS — I11.0: ICD-10-CM

## 2018-02-01 DIAGNOSIS — F41.9: ICD-10-CM

## 2018-02-01 DIAGNOSIS — D50.9: Primary | ICD-10-CM

## 2018-02-01 DIAGNOSIS — Z99.89: ICD-10-CM

## 2018-02-01 DIAGNOSIS — E78.00: ICD-10-CM

## 2018-02-01 DIAGNOSIS — I50.30: ICD-10-CM

## 2018-02-01 DIAGNOSIS — Z91.048: ICD-10-CM

## 2018-02-01 DIAGNOSIS — Z88.0: ICD-10-CM

## 2018-02-01 DIAGNOSIS — E55.9: ICD-10-CM

## 2018-02-01 DIAGNOSIS — K21.9: ICD-10-CM

## 2018-02-01 DIAGNOSIS — Z79.4: ICD-10-CM

## 2018-02-01 DIAGNOSIS — F43.10: ICD-10-CM

## 2018-02-01 DIAGNOSIS — Z79.899: ICD-10-CM

## 2018-02-01 DIAGNOSIS — E03.9: ICD-10-CM

## 2018-02-01 DIAGNOSIS — M54.17: ICD-10-CM

## 2018-02-01 DIAGNOSIS — N18.4: ICD-10-CM

## 2018-02-01 RX ADMIN — SODIUM CHLORIDE 1 MLS/HR: 9 INJECTION, SOLUTION INTRAVENOUS at 10:33

## 2018-02-01 RX ADMIN — SODIUM CHLORIDE 1 MLS/HR: 9 INJECTION, SOLUTION INTRAVENOUS at 11:41

## 2018-02-13 ENCOUNTER — HOSPITAL ENCOUNTER (OUTPATIENT)
Dept: HOSPITAL 53 - M SFHCPLAZ | Age: 68
End: 2018-02-13
Attending: INTERNAL MEDICINE
Payer: MEDICARE

## 2018-02-13 DIAGNOSIS — A49.02: Primary | ICD-10-CM

## 2018-02-13 LAB
ANION GAP: 9 MEQ/L (ref 8–16)
BASO #: 0.1 10^3/UL (ref 0–0.2)
BASO %: 0.8 % (ref 0–1)
BLOOD UREA NITROGEN: 26 MG/DL (ref 7–18)
CALCIUM LEVEL: 9 MG/DL (ref 8.8–10.2)
CARBON DIOXIDE LEVEL: 26 MEQ/L (ref 21–32)
CHLORIDE LEVEL: 107 MEQ/L (ref 98–107)
CREATININE FOR GFR: 1.77 MG/DL (ref 0.7–1.3)
CRP SERPL-MCNC: 1.99 MG/DL (ref 0–0.3)
EOS #: 0.4 10^3/UL (ref 0–0.5)
EOSINOPHIL NFR BLD AUTO: 5.7 % (ref 0–3)
ERYTHROCYTE SEDIMENTATION RATE: 68 MM/HR (ref 0–20)
GFR SERPL CREATININE-BSD FRML MDRD: 41.1 ML/MIN/{1.73_M2} (ref 49–?)
GLUCOSE, FASTING: 107 MG/DL (ref 70–100)
HEMATOCRIT: 33 % (ref 42–52)
HEMOGLOBIN: 10.2 G/DL (ref 14–18)
IMMATURE GRANULOCYTE %: 0.9 % (ref 0–3)
LYMPH #: 1.5 10^3/UL (ref 1.5–4.5)
LYMPH %: 20.8 % (ref 24–44)
MEAN CORPUSCULAR HEMOGLOBIN: 27.4 PG (ref 27–33)
MEAN CORPUSCULAR HGB CONC: 30.9 G/DL (ref 32–36.5)
MEAN CORPUSCULAR VOLUME: 88.7 FL (ref 80–96)
MONO #: 0.5 10^3/UL (ref 0–0.8)
MONO %: 6.4 % (ref 0–5)
NEUTROPHILS #: 4.8 10^3/UL (ref 1.8–7.7)
NEUTROPHILS %: 65.4 % (ref 36–66)
NRBC BLD AUTO-RTO: 0 % (ref 0–0)
PLATELET COUNT, AUTOMATED: 195 10^3/UL (ref 150–450)
POTASSIUM SERUM: 4.7 MEQ/L (ref 3.5–5.1)
RED BLOOD COUNT: 3.72 10^6/UL (ref 4.3–6.1)
RED CELL DISTRIBUTION WIDTH: 15.3 % (ref 11.5–14.5)
SODIUM LEVEL: 142 MEQ/L (ref 136–145)
WHITE BLOOD COUNT: 7.4 10^3/UL (ref 4–10)

## 2018-02-13 PROCEDURE — 80048 BASIC METABOLIC PNL TOTAL CA: CPT

## 2020-09-22 ENCOUNTER — HOSPITAL ENCOUNTER (OUTPATIENT)
Dept: HOSPITAL 53 - M RAD | Age: 70
End: 2020-09-22
Attending: ANESTHESIOLOGY
Payer: MEDICARE

## 2020-09-22 DIAGNOSIS — J44.9: ICD-10-CM

## 2020-09-22 DIAGNOSIS — I25.10: ICD-10-CM

## 2020-09-22 DIAGNOSIS — Z01.818: Primary | ICD-10-CM

## 2020-09-29 NOTE — REP
CHEST X-RAY:  2-VIEWS 



HISTORY:  COPD 



COMPARISON: 1/12/18 as well as other prior exams.



FINDINGS:

There is no evidence of acute infiltrate. The lungs are clear. The heart is 
normal in size. Mediastinal silhouette is unremarkable and unchanged. There are 
moderate degenerative changes of the spine. There is a stable compression 
deformity of a mid-thoracic vertebral body. 



IMPRESSION: 

No active pulmonary disease.

MTDD

## 2020-10-01 ENCOUNTER — HOSPITAL ENCOUNTER (OUTPATIENT)
Dept: HOSPITAL 53 - M LABSMTC | Age: 70
End: 2020-10-01
Attending: ANESTHESIOLOGY
Payer: MEDICARE

## 2020-10-01 DIAGNOSIS — Z20.828: ICD-10-CM

## 2020-10-01 DIAGNOSIS — Z01.812: Primary | ICD-10-CM

## 2020-10-06 ENCOUNTER — HOSPITAL ENCOUNTER (OUTPATIENT)
Dept: HOSPITAL 53 - M SDC | Age: 70
Discharge: HOME | End: 2020-10-06
Attending: SURGERY
Payer: MEDICARE

## 2020-10-06 VITALS — DIASTOLIC BLOOD PRESSURE: 64 MMHG | SYSTOLIC BLOOD PRESSURE: 125 MMHG

## 2020-10-06 VITALS — HEIGHT: 70 IN | BODY MASS INDEX: 34.93 KG/M2 | WEIGHT: 244 LBS

## 2020-10-06 DIAGNOSIS — Z88.8: ICD-10-CM

## 2020-10-06 DIAGNOSIS — E78.00: ICD-10-CM

## 2020-10-06 DIAGNOSIS — I25.10: ICD-10-CM

## 2020-10-06 DIAGNOSIS — Z79.82: ICD-10-CM

## 2020-10-06 DIAGNOSIS — G20: ICD-10-CM

## 2020-10-06 DIAGNOSIS — K57.92: ICD-10-CM

## 2020-10-06 DIAGNOSIS — F32.9: ICD-10-CM

## 2020-10-06 DIAGNOSIS — E11.9: ICD-10-CM

## 2020-10-06 DIAGNOSIS — K43.2: Primary | ICD-10-CM

## 2020-10-06 DIAGNOSIS — G47.33: ICD-10-CM

## 2020-10-06 DIAGNOSIS — F41.9: ICD-10-CM

## 2020-10-06 DIAGNOSIS — F17.218: ICD-10-CM

## 2020-10-06 DIAGNOSIS — Z88.0: ICD-10-CM

## 2020-10-06 DIAGNOSIS — Z79.899: ICD-10-CM

## 2020-10-06 DIAGNOSIS — Z91.040: ICD-10-CM

## 2020-10-06 DIAGNOSIS — J44.9: ICD-10-CM

## 2020-10-06 PROCEDURE — 49654: CPT

## 2020-11-03 NOTE — RO
DATE OF OPERATION:  10/06/2020



PREOPERATIVE DIAGNOSIS:  Incisional hernia.



POSTOPERATIVE DIAGNOSIS:  Incisional hernia.



PROCEDURE:  Laparoscopic incisional hernia repair with Ventralight mesh.



SURGEON: Leo Gosselin, MD



ANESTHESIA: General endotracheal anesthesia.



EBL: Minimal.



FLUIDS: Crystalloid.



BRIEF PROCEDURE SUMMARY: The patient was brought to the operating room, given 
general anesthesia. After adequate anesthesia and preoperative antibiotics were 
given the patient was prepped and draped in usual sterile fashion. An epigastric
incision was made with skin knife, blunt dissection was carried down to fascia 
and Veress needle was placed into the abdominal cavity, insufflated to 15 mm of 
pressure. Left subcostal and left lateral abdominal 5 mm trocars were placed. 
Once the trocar was placed I was able to see where I could place a lateral 
trocar on the left-hand side and this was also repeated on the right-hand side 
after I was able to take down some adhesions along the midline. There were a 
significant amount of adhesions that I eventually was able to take down with 
Harmonic scalpel. Fortunately the majority of these were taken down with 
Harmonic scalpel but there was still some small bowel adherent to the midline 
and eventually once I was able to take this down with sharp dissection the 
hernia was well visualized along the midline. Previously I had done a 
laparoscopic colostomy reversal and I was able to take down at least some of 
these adhesions last visit and thus, even there were still a fair bit, it was 
not as bad as it could have been. In any case, once these adhesions were taken 
down in the midline, there were multiple smaller, almost Swiss cheesing of the 
incision just above the umbilicus and then there was a larger hernia below that 
with some diastasis of the rectus muscle and this extended down to the mid area 
between the pubis and the umbilicus. Once this was eventually established the 
patient was placed in Trendelenburg position and I then placed 12 mm trocar just
above the umbilicus which actually it seemed right about at the level of the 
midportion of the multiple hernias present. Once these were well visualized and 
identified the Ventralight mesh was positioned and circumferentially using 
SecureStraps after the abdominal pressure was decreased, I used three 
SecureStraps to tack the Ventralight in circumferentially. Once this was 
performed circumferentially then additional tacks on the rectus muscle were 
added. The abdomen was then desufflated after removing the green balloon for the
Ventralight. Once this was all intact local Exparel was injected laterally and 
all sites were closed with staples. Dry, sterile dressing was applied. The 
patient was awakened from his anesthesia, extubated and brought to the recovery 
room awake, alert, hemodynamically stable. Sponge and needle counts correct x2.

MTDD

## 2021-01-02 ENCOUNTER — HOSPITAL ENCOUNTER (OUTPATIENT)
Dept: HOSPITAL 53 - M LABSMTC | Age: 71
End: 2021-01-02
Attending: ANESTHESIOLOGY
Payer: MEDICARE

## 2021-01-02 DIAGNOSIS — Z01.812: Primary | ICD-10-CM

## 2021-01-02 DIAGNOSIS — Z20.828: ICD-10-CM

## 2021-01-07 ENCOUNTER — HOSPITAL ENCOUNTER (OUTPATIENT)
Dept: HOSPITAL 53 - M OPP | Age: 71
Discharge: HOME | End: 2021-01-07
Attending: SURGERY
Payer: MEDICARE

## 2021-01-07 VITALS — WEIGHT: 239 LBS | HEIGHT: 70 IN | BODY MASS INDEX: 34.22 KG/M2

## 2021-01-07 VITALS — SYSTOLIC BLOOD PRESSURE: 133 MMHG | DIASTOLIC BLOOD PRESSURE: 77 MMHG

## 2021-01-07 DIAGNOSIS — Z88.0: ICD-10-CM

## 2021-01-07 DIAGNOSIS — M19.90: ICD-10-CM

## 2021-01-07 DIAGNOSIS — Z86.14: ICD-10-CM

## 2021-01-07 DIAGNOSIS — F31.9: ICD-10-CM

## 2021-01-07 DIAGNOSIS — E11.9: ICD-10-CM

## 2021-01-07 DIAGNOSIS — Z88.8: ICD-10-CM

## 2021-01-07 DIAGNOSIS — Z91.040: ICD-10-CM

## 2021-01-07 DIAGNOSIS — Z79.899: ICD-10-CM

## 2021-01-07 DIAGNOSIS — Z79.4: ICD-10-CM

## 2021-01-07 DIAGNOSIS — K57.30: ICD-10-CM

## 2021-01-07 DIAGNOSIS — I25.10: ICD-10-CM

## 2021-01-07 DIAGNOSIS — G47.30: ICD-10-CM

## 2021-01-07 DIAGNOSIS — J44.9: ICD-10-CM

## 2021-01-07 DIAGNOSIS — F17.210: ICD-10-CM

## 2021-01-07 DIAGNOSIS — F43.10: ICD-10-CM

## 2021-01-07 DIAGNOSIS — I25.2: ICD-10-CM

## 2021-01-07 DIAGNOSIS — G20: ICD-10-CM

## 2021-01-07 DIAGNOSIS — Z12.11: Primary | ICD-10-CM

## 2021-01-07 DIAGNOSIS — F41.9: ICD-10-CM

## 2021-01-07 DIAGNOSIS — D12.6: ICD-10-CM

## 2021-01-07 DIAGNOSIS — Z91.09: ICD-10-CM

## 2021-01-07 DIAGNOSIS — B20: ICD-10-CM

## 2021-01-07 DIAGNOSIS — Z86.010: ICD-10-CM

## 2021-01-07 NOTE — ROOR
________________________________________________________________________________

Patient Name: Robbin Singleton          Procedure Date: 1/7/2021 8:16 AM

MRN: [id number]                          Account Number: [id number]

YOB: 1950               Age: 70

Room: [facility]                            Gender: Male

Note Status: Finalized                 

________________________________________________________________________________

 

Procedure:            Colonoscopy

Indications:          High risk colon cancer surveillance: Personal history of 

                      colonic polyps

Providers:            Leo J. Gosselin Jr, MD

Referring MD:         JACK NIÑO

Requesting Provider:  

Medicines:            Propofol per Anesthesia

Complications:        No immediate complications.

________________________________________________________________________________

Procedure:            Pre-Anesthesia Assessment:

                      - Prior to the procedure, a History and Physical was 

                      performed, and patient medications and allergies were 

                      reviewed. The patient is competent. The risks and 

                      benefits of the procedure and the sedation options and 

                      risks were discussed with the patient. All questions 

                      were answered and informed consent was obtained. Patient 

                      identification and proposed procedure were verified by 

                      the physician and the nurse in the pre-procedure area 

                      and in the procedure room. Mental Status Examination: 

                      alert and oriented. Airway Examination: normal 

                      oropharyngeal airway and neck mobility. Respiratory 

                      Examination: clear to auscultation. CV Examination: 

                      normal. ASA Grade Assessment: III - A patient with 

                      severe systemic disease. After reviewing the risks and 

                      benefits, the patient was deemed in satisfactory 

                      condition to undergo the procedure. The anesthesia plan 

                      was to use moderate sedation / analgesia (conscious 

                      sedation). Immediately prior to administration of 

                      medications, the patient was re-assessed for adequacy to 

                      receive sedatives. The heart rate, respiratory rate, 

                      oxygen saturations, blood pressure, adequacy of 

                      pulmonary ventilation, and response to care were 

                      monitored throughout the procedure. The physical status 

                      of the patient was re-assessed after the procedure.

                      The Colonoscope was introduced through the anus and 

                      advanced to the cecum, identified by appendiceal orifice 

                      and ileocecal valve. The colonoscopy was performed 

                      without difficulty. The patient tolerated the procedure 

                      well. The quality of the bowel preparation was adequate.

                                                                                

Findings:

     The cecum, appendiceal orifice, ileocecal valve and anastomosis appeared 

     normal.

     Six polyps were found in the rectum, recto-sigmoid colon, descending 

     colon, transverse colon and ascending colon. The polyps were small in 

     size. These polyps were removed with a hot snare. Resection was complete, 

     but the polyp tissue was only partially retrieved.

     Multiple small-mouthed diverticula were found in the descending colon.

                                                                                

Impression:           - The cecum, appendiceal orifice, ileocecal valve and 

                      colonic anastomosis are normal.

                      - Six small polyps in the rectum, at the recto-sigmoid 

                      colon, in the descending colon, in the transverse colon 

                      and in the ascending colon, removed with a hot snare. 

                      Complete resection. Partial retrieval.

                      - Diverticulosis in the descending colon.

Recommendation:       - Discharge patient to home (ambulatory).

                      - Repeat colonoscopy in 5 years for surveillance.

                                                                                

Procedure Code(s):    --- Professional ---

                      79066, Colonoscopy, flexible; with removal of tumor(s), 

                      polyp(s), or other lesion(s) by snare technique

Diagnosis Code(s):    --- Professional ---

                      Z86.010, Personal history of colonic polyps

                      K62.1, Rectal polyp

                      K63.5, Polyp of colon

                      K57.30, Diverticulosis of large intestine without 

                      perforation or abscess without bleeding

 

CPT copyright 2019 [employer]. All rights reserved.

 

The codes documented in this report are preliminary and upon  review may 

be revised to meet current compliance requirements.

 

Leo Gosselin MD

_____________________

Leo J Gosselin Jr, MD

1/7/2021 8:59:15 AM

Electronically signed by Leo Gosselin Jr , MD

Number of Addenda: 0

 

Note Initiated On: 1/7/2021 8:16 AM

Estimated Blood Loss: Estimated blood loss: none.

## 2021-03-30 ENCOUNTER — HOSPITAL ENCOUNTER (OUTPATIENT)
Dept: HOSPITAL 53 - M LAB REF | Age: 71
End: 2021-03-30
Attending: NURSE PRACTITIONER
Payer: MEDICARE

## 2021-03-30 DIAGNOSIS — D50.9: Primary | ICD-10-CM

## 2021-03-30 LAB
IRON SATN MFR SERPL: 17.9 % (ref 19.7–50)
IRON SERPL-MCNC: 63 UG/DL (ref 65–175)
TIBC SERPL-MCNC: 351 UG/DL (ref 250–450)

## 2021-04-16 ENCOUNTER — HOSPITAL ENCOUNTER (OUTPATIENT)
Dept: HOSPITAL 53 - M RAD | Age: 71
End: 2021-04-16
Attending: UROLOGY
Payer: MEDICARE

## 2021-04-16 DIAGNOSIS — K80.20: Primary | ICD-10-CM

## 2021-04-16 DIAGNOSIS — R31.29: ICD-10-CM

## 2021-04-16 DIAGNOSIS — R16.1: ICD-10-CM

## 2021-04-16 DIAGNOSIS — Q61.02: ICD-10-CM

## 2021-04-16 PROCEDURE — 74178 CT ABD&PLV WO CNTR FLWD CNTR: CPT

## 2021-04-16 NOTE — REP
INDICATION:

HEMATURIA.



COMPARISON:

01/26/2018.



TECHNIQUE:

CT abdomen and pelvis performed without IV contrast.  CT abdomen and pelvis performed

with IV contrast as well, following intravenous administration of 100 cc of Isovue

370.  Sagittal, coronal and 3D MIP reconstruction images are performed.



FINDINGS:



Lung bases: There is mild bibasilar fibro atelectatic change.  There are 2 small

calcified granulomas in the left lower lobe.

Liver:  Normal

Gallbladder:  There is a subcentimeter gallstone in the gallbladder with no evidence

gallbladder wall edema..

Spleen:  There is mild splenomegaly, the length of the spleen is approximately 14.1

cm..

Adrenals:  Normal.

Pancreas:  Normal.

Kidneys:  There is a 7 mm calculus in the left lower pole collecting system.  There

are multiple bilateral renal cysts which do not demonstrate suspicious enhancement.

The largest on the right is superior and posterior and somewhat exophytic measuring 3

cm in maximum diameter.  The largest on the left is exophytic at the medial aspect of

the lower pole.  This is bilobed and demonstrates mild linear calcification in the

lower wall of the cyst.  Maximum diameter is approximately 4.1 cm.  No suspicious

solid renal mass is seen.  Ureters demonstrate no calculus or other abnormality.

Small and large bowel: Unremarkable.

Free fluid:  None.

Adenopathy:  None.

Appendix: Not inflamed.

Osseous structures:  There are degenerative changes of the spine without compression

deformity..  Metallic hip prostheses are seen bilaterally.

Pelvis:  In the posteroinferior bladder lumen there is a small irregular crescentic

defect which could be due to the patient's prostate.  Evaluation of the bladder is

limited, streak artifact from the hip prostheses limits evaluation of pelvic

structures.



IMPRESSION:

Subcentimeter gallstone in a nondistended gallbladder.  Mild splenomegaly.



7 mm calculus lower pole left renal collecting system.  Multiple bilateral renal cysts

with no suspicious enhancement.  Limited evaluation of pelvic structures, including

the bladder, due to metallic hip prostheses.  Broad small irregular filling defect at

the posteroinferior bladder may be due to the prostate.  Consider cystoscopy.





<Electronically signed by Jackson Tinajero > 04/16/21 [id number]

## 2021-07-15 ENCOUNTER — HOSPITAL ENCOUNTER (OUTPATIENT)
Dept: HOSPITAL 53 - M RAD | Age: 71
End: 2021-07-15
Attending: INTERNAL MEDICINE
Payer: MEDICARE

## 2021-07-15 DIAGNOSIS — F17.218: Primary | ICD-10-CM

## 2021-07-15 NOTE — REP
INDICATION:

NICOTINE DEPENDENCE.



COMPARISON:

Comparison study May 20, 2019..



TECHNIQUE:

Dose reduction was performed utilizing CARE dose with automated adjustment of the kV

and MAS according to patient size; iterative reconstruction, automated exposure

control, as well as adaptive dose shielding.  Helical scanning is acquired and 3 mm

axial images are re-formatted at lung windows.



FINDINGS:

Digital preliminary  radiograph is unremarkable.  There is bilateral upper lobe

predominantly peripheral pattern of interstitial fibrosis which is more pronounced

than on the 2019 study.  There is no evidence of pleural effusion.  No pulmonary mass

lesion is seen.



There is a 5 mm noncalcified pulmonary nodule in the left upper lobe on page 53 of 116

in today's study.  This is unchanged from the prior study, previously measured as 7

mm.  There are 2 tiny stable nodules in the left lower lobe.  These are observed on

page 68 and page 79 of today's exam.  They are visible in retrospect and are

unchanged.  No new pulmonary nodule is appreciated.  There is some mucoid material in

the trachea and right mainstem bronchus.  No bony destructive lesion.



IMPRESSION:

Lung RADS category 1 findings.  Peripheral pattern of bilateral upper lobe

interstitial fibrosis has progressed somewhat since the May 20, 2019 prior study.

Repeat screening study indicated in 1 year.





<Electronically signed by Manuel Mckeon > 07/15/21 [id number]

## 2021-11-03 ENCOUNTER — HOSPITAL ENCOUNTER (EMERGENCY)
Dept: HOSPITAL 53 - M ED | Age: 71
Discharge: HOME | End: 2021-11-03
Payer: MEDICARE

## 2021-11-03 VITALS — SYSTOLIC BLOOD PRESSURE: 115 MMHG | DIASTOLIC BLOOD PRESSURE: 59 MMHG

## 2021-11-03 VITALS — HEIGHT: 70 IN | BODY MASS INDEX: 32.42 KG/M2 | WEIGHT: 226.48 LBS

## 2021-11-03 DIAGNOSIS — Z91.040: ICD-10-CM

## 2021-11-03 DIAGNOSIS — K57.92: ICD-10-CM

## 2021-11-03 DIAGNOSIS — Z79.899: ICD-10-CM

## 2021-11-03 DIAGNOSIS — Z87.442: ICD-10-CM

## 2021-11-03 DIAGNOSIS — N28.1: ICD-10-CM

## 2021-11-03 DIAGNOSIS — I25.2: ICD-10-CM

## 2021-11-03 DIAGNOSIS — Z91.89: ICD-10-CM

## 2021-11-03 DIAGNOSIS — F32.9: ICD-10-CM

## 2021-11-03 DIAGNOSIS — Z88.0: ICD-10-CM

## 2021-11-03 DIAGNOSIS — I25.10: ICD-10-CM

## 2021-11-03 DIAGNOSIS — R79.89: Primary | ICD-10-CM

## 2021-11-03 DIAGNOSIS — Z96.643: ICD-10-CM

## 2021-11-03 DIAGNOSIS — J30.89: ICD-10-CM

## 2021-11-03 DIAGNOSIS — N20.0: ICD-10-CM

## 2021-11-03 DIAGNOSIS — J44.9: ICD-10-CM

## 2021-11-03 DIAGNOSIS — F17.200: ICD-10-CM

## 2021-11-03 DIAGNOSIS — K59.00: ICD-10-CM

## 2021-11-03 DIAGNOSIS — E78.5: ICD-10-CM

## 2021-11-03 DIAGNOSIS — R06.2: ICD-10-CM

## 2021-11-03 DIAGNOSIS — K52.9: ICD-10-CM

## 2021-11-03 DIAGNOSIS — I10: ICD-10-CM

## 2021-11-03 DIAGNOSIS — Z88.8: ICD-10-CM

## 2021-11-03 DIAGNOSIS — Z79.4: ICD-10-CM

## 2021-11-03 LAB
ALBUMIN SERPL BCG-MCNC: 3.7 GM/DL (ref 3.2–5.2)
ALT SERPL W P-5'-P-CCNC: 14 U/L (ref 12–78)
BILIRUB CONJ SERPL-MCNC: 0.2 MG/DL (ref 0–0.2)
BILIRUB SERPL-MCNC: 0.5 MG/DL (ref 0.2–1)
CK MB CFR.DF SERPL CALC: 2.15
CK MB SERPL-MCNC: 1.7 NG/ML (ref ?–3.6)
CK SERPL-CCNC: 79 U/L (ref 39–308)
EOSINOPHIL NFR BLD MANUAL: 7 % (ref 0–3)
HCT VFR BLD AUTO: 47.5 % (ref 42–52)
HGB BLD-MCNC: 15.6 G/DL (ref 13.5–17.5)
LIPASE SERPL-CCNC: 95 U/L (ref 73–393)
LYMPHOCYTES NFR BLD MANUAL: 17 % (ref 16–44)
MCH RBC QN AUTO: 30.3 PG (ref 27–33)
MCHC RBC AUTO-ENTMCNC: 32.8 G/DL (ref 32–36.5)
MCV RBC AUTO: 92.2 FL (ref 80–96)
MONOCYTES NFR BLD MANUAL: 3 % (ref 0–5)
NEUTROPHILS NFR BLD MANUAL: 66 % (ref 28–66)
NT-PRO BNP: 40 PG/ML (ref ?–125)
PLATELET # BLD AUTO: 184 10^3/UL (ref 150–450)
PLATELET BLD QL SMEAR: NORMAL
PROT SERPL-MCNC: 7 GM/DL (ref 6.4–8.2)
RBC # BLD AUTO: 5.15 10^6/UL (ref 4.3–6.1)
TROPONIN I SERPL-MCNC: < 0.02 NG/ML (ref ?–0.1)
VARIANT LYMPHS NFR BLD MANUAL: 3 % (ref 0–5)
WBC # BLD AUTO: 12.9 10^3/UL (ref 4–10)

## 2021-11-03 PROCEDURE — 94760 N-INVAS EAR/PLS OXIMETRY 1: CPT

## 2021-11-03 PROCEDURE — 83690 ASSAY OF LIPASE: CPT

## 2021-11-03 PROCEDURE — 84484 ASSAY OF TROPONIN QUANT: CPT

## 2021-11-03 PROCEDURE — 74177 CT ABD & PELVIS W/CONTRAST: CPT

## 2021-11-03 PROCEDURE — 80047 BASIC METABLC PNL IONIZED CA: CPT

## 2021-11-03 PROCEDURE — 93041 RHYTHM ECG TRACING: CPT

## 2021-11-03 PROCEDURE — 85025 COMPLETE CBC W/AUTO DIFF WBC: CPT

## 2021-11-03 PROCEDURE — 96361 HYDRATE IV INFUSION ADD-ON: CPT

## 2021-11-03 PROCEDURE — 82553 CREATINE MB FRACTION: CPT

## 2021-11-03 PROCEDURE — 83880 ASSAY OF NATRIURETIC PEPTIDE: CPT

## 2021-11-03 PROCEDURE — 96374 THER/PROPH/DIAG INJ IV PUSH: CPT

## 2021-11-03 PROCEDURE — 99284 EMERGENCY DEPT VISIT MOD MDM: CPT

## 2021-11-03 PROCEDURE — 83605 ASSAY OF LACTIC ACID: CPT

## 2021-11-03 PROCEDURE — 93005 ELECTROCARDIOGRAM TRACING: CPT

## 2021-11-03 PROCEDURE — 82550 ASSAY OF CK (CPK): CPT

## 2021-11-03 PROCEDURE — 80076 HEPATIC FUNCTION PANEL: CPT

## 2021-11-03 PROCEDURE — 94640 AIRWAY INHALATION TREATMENT: CPT

## 2021-11-03 PROCEDURE — 71046 X-RAY EXAM CHEST 2 VIEWS: CPT

## 2021-11-03 NOTE — CCD
No Summarization Of Episode

                             Created on: 2021



BISI SINGLETON

External Reference #: [id number]

: 1950

Sex: Undifferentiated



Demographics





                          Address                   [address] [place]  [address]

 

                          Home Phone                [phone]

 

                          Preferred Language        Unknown

 

                          Marital Status            Unknown

 

                          Sabianism Affiliation     Unknown

 

                          Race                      Unknown

 

                          Ethnic Group              Not  or 





Author





                          Author                    [facility]

 

                          Organization              [facility]

 

                          Address                   Unknown

 

                          Phone                     Unavailable







Support





                Name            Relationship    Address         Phone

 

                    MIHAI  NEELA   Next Of Kin         [address]

[place]  [address]                [phone]

 

                RET             Next Of Kin     Unknown         Unavailable

 

                    CLIFF SINGLETON      Next Of Kin         [address] [place], [place]                        [phone]

 

                UNK             Next Of Kin     Unknown         Unavailable

 

                    ANGELICA SINGLETON  Next Of Kin         [address]

[place]  [address]                     [phone]

 

                RE              Next Of Kin     Unknown         Unavailable

 

                    [place]            Next Of Kin         [address] [place]  [address]                       [phone]

 

                    MIHAI, ([facility]) ALAYNA Next Of Kin         [address] [place]  [address]                     [phone]

 

                    NEELA SINGLETON   ECON                [address] 

[place]  [address]                 Unavailable

 

                    Alayna Singleotn     ECON                [address]

[place]  [address]                     [phone]







Care Team Providers





                    Care Team Member Name Role                Phone

 

                    Araseli Paula FNP Unavailable         Unavailable

 

                    Boaz, Araseli Pacheco FNP Unavailable         Unavailable

 

                    Boaz, Araseli Pacheco FNP Unavailable         Unavailable

 

                    Boaz, Araseli Pacheco FNP Unavailable         Unavailable

 

                    Nisa, Araseli Pacheco FNP Unavailable         Unavailable

 

                    Nisa, Araseli Pacheco FNP Unavailable         Unavailable

 

                    Nisa, Araseli Pacheco FNP Unavailable         Unavailable

 

                    Nisa, Araseli Pacheco FNP Unavailable         Unavailable

 

                    Nisa, Araseli Pacheco FNP Unavailable         Unavailable

 

                    Nisa, Araseli Pacheco FNP Unavailable         Unavailable

 

                    Boaz, Araseli Pacheco FNP Unavailable         Unavailable

 

                    Nisa, Araseli Pacheco FNP Unavailable         Unavailable

 

                    Nisa, Araseli Pacheco FNP Unavailable         Unavailable

 

                    Nisa, Araseli Pacheco FNP Unavailable         Unavailable

 

                    HEIDI Lua PA Unavailable         Unavailable

 

                    HEIDI Lua PA Unavailable         Unavailable

 

                    HEIDI Lua PA Unavailable         Unavailable

 

                    HEIDI Lua PA Unavailable         Unavailable

 

                    Stoney, D Irvin PA Unavailable         Unavailable

 

                    Stoney, D Irvin PA Unavailable         Unavailable

 

                    Stoney, D Irvin PA Unavailable         Unavailable

 

                    Stoney, D Irvin PA Unavailable         Unavailable

 

                    Stoney, D Irvin PA Unavailable         Unavailable

 

                    Stoney, D Irvin PA Unavailable         Unavailable

 

                    Stoney, D Irvin PA Unavailable         Unavailable

 

                    Stoney, D Irvin PA Unavailable         Unavailable

 

                    Stoney, D Irvin PA Unavailable         Unavailable

 

                    Stoney, D Irvin PA Unavailable         Unavailable

 

                    Stoney, D Irvin PA Unavailable         Unavailable

 

                    Stoney, D Irvin PA Unavailable         Unavailable

 

                    Stoney, D Irvin PA Unavailable         Unavailable

 

                    Stoney, D Irvin PA Unavailable         Unavailable

 

                    Stoney, D Irvin PA Unavailable         Unavailable

 

                    Stoney, D Irvin PA Unavailable         Unavailable

 

                    Stoney, D Irvin PA Unavailable         Unavailable

 

                    Stoney, D Irvin PA Unavailable         Unavailable

 

                    Stoney, D Irvin PA Unavailable         Unavailable

 

                    Stoney, D Irvin PA Unavailable         Unavailable

 

                    Stoney, D Irvin PA Unavailable         Unavailable

 

                    Stoney, D Irvin PA Unavailable         Unavailable

 

                    Stoney, D Irvin PA Unavailable         Unavailable

 

                    Stoney, D Irvin PA Unavailable         Unavailable

 

                    Stoney, D Irvin PA Unavailable         Unavailable

 

                    Stoney, D Irvin PA Unavailable         Unavailable

 

                    Stoney, D Irvin PA Unavailable         Unavailable

 

                    Stoney, D Irvin PA Unavailable         Unavailable

 

                    Stoney, D Irvin PA Unavailable         Unavailable

 

                    Stoney, D Irvin PA Unavailable         Unavailable

 

                    Stoney, D Irvin PA Unavailable         Unavailable

 

                    Stoney, D Irvin PA Unavailable         Unavailable

 

                    Stoney, D Irvin PA Unavailable         Unavailable

 

                    Stoney, D Irvin PA Unavailable         Unavailable

 

                    Stoney, D Irvin PA Unavailable         Unavailable

 

                    Tsoney, D Irvin PA Unavailable         Unavailable

 

                    Stoney, D Irvin PA Unavailable         Unavailable

 

                    Stoney, D Irvin PA Unavailable         Unavailable

 

                    Stoney, D Irvin PA Unavailable         Unavailable

 

                    Stoney, D Irvin PA Unavailable         Unavailable

 

                    Stoney, D Irvin PA Unavailable         Unavailable

 

                    Stoney, D Irvin PA Unavailable         Unavailable

 

                    Stoney, D Irvin PA Unavailable         Unavailable

 

                    Stoney, D Irvin PA Unavailable         Unavailable

 

                    Stoney, D Irvin PA Unavailable         Unavailable

 

                    Stoney, D Irvin PA Unavailable         Unavailable

 

                    Stoney, D Irvin PA Unavailable         Unavailable

 

                    Stoney, D Irvin PA Unavailable         Unavailable

 

                    Stoney, D Irvin PA Unavailable         Unavailable

 

                    Stoney, D Irvin PA Unavailable         Unavailable

 

                    Stoney, D Irvin PA Unavailable         Unavailable

 

                    Stoney, D Irvin PA Unavailable         Unavailable

 

                    Stoney, D Irvin PA Unavailable         Unavailable

 

                    Stoney, D Irvin PA Unavailable         Unavailable

 

                    Stoney, D Irvin PA Unavailable         Unavailable

 

                    Stoney, D Irvin PA Unavailable         Unavailable

 

                    Stoney, D Irvin PA Unavailable         Unavailable

 

                    Stoney, D Irvin PA Unavailable         Unavailable

 

                    Stoney, D Irvin PA Unavailable         Unavailable

 

                    Stoney, D Irvin PA Unavailable         Unavailable

 

                    Stoney, D Irvin PA Unavailable         Unavailable

 

                    Stoney, D Irvin PA Unavailable         Unavailable

 

                    Stoney, D Irvin PA Unavailable         Unavailable

 

                    Stoney, D Irvin PA Unavailable         Unavailable

 

                    Gosselin JR, J Leo MD Unavailable         Unavailable

 

                    Gosselin JR, J Leo MD Unavailable         Unavailable

 

                    Gosselin JR, J Leo MD Unavailable         Unavailable

 

                    Gosselin JR, J Leo MD Unavailable         Unavailable

 

                    Gosselin JR, J Leo MD Unavailable         Unavailable

 

                    Gosselin JR, J Leo MD Unavailable         Unavailable

 

                    Gosselin JR, J Leo MD Unavailable         Unavailable

 

                    Gosselin JR, J Leo MD Unavailable         Unavailable

 

                    Gosselin JR, J Leo MD Unavailable         Unavailable

 

                    Gosselin JR, J Leo MD Unavailable         Unavailable

 

                    Gosselin JR, J Leo MD Unavailable         Unavailable

 

                    Gosselin JR, J Leo MD Unavailable         Unavailable

 

                    Gosselin JR, J Leo MD Unavailable         Unavailable

 

                    Gosselin JR, J Leo MD Unavailable         Unavailable

 

                    Gosselin JR, J Leo MD Unavailable         Unavailable

 

                    Gosselin JR, J Leo MD Unavailable         Unavailable

 

                    Gosselin JR, J Leo MD Unavailable         Unavailable

 

                    Gosselin JR, J Leo MD Unavailable         Unavailable

 

                    Gosselin JR, J Leo MD Unavailable         Unavailable

 

                    Gosselin JR, J Leo MD Unavailable         Unavailable

 

                    Gosselin JR, J Leo MD Unavailable         Unavailable

 

                    Gosselin JR, J Leo MD Unavailable         Unavailable

 

                    Gosselin JR, J Leo MD Unavailable         Unavailable

 

                    Gosselin JR, J Leo MD Unavailable         Unavailable

 

                    Gosselin JR, J Leo MD Unavailable         Unavailable

 

                    Gosselin JR, J Leo MD Unavailable         Unavailable

 

                    Gosselin JR, J Leo MD Unavailable         Unavailable

 

                    Gosselin JR, J Leo MD Unavailable         Unavailable

 

                    Gosselin JR, J Leo MD Unavailable         Unavailable

 

                    Gosselin JR, J Leo MD Unavailable         Unavailable

 

                    Gosselin JR, J Leo MD Unavailable         Unavailable

 

                    Gosselin JR, J Leo MD Unavailable         Unavailable

 

                    Gosselin JR, J Leo MD Unavailable         Unavailable

 

                    Gosselin JR, J Leo MD Unavailable         Unavailable

 

                    Gosselin JR, J Leo MD Unavailable         Unavailable

 

                    Gosselin JR, J Leo MD Unavailable         Unavailable

 

                    Gosselin JR, J Leo MD Unavailable         Unavailable

 

                    Gosselin JR, J Leo MD Unavailable         Unavailable

 

                    Gosselin JR, J Leo MD Unavailable         Unavailable

 

                    Gosselin JR, J Leo MD Unavailable         Unavailable

 

                    Gosselin JR, J Leo MD Unavailable         Unavailable

 

                    Gosselin JR, J Leo MD Unavailable         Unavailable

 

                    Gosselin JR, J Leo MD Unavailable         Unavailable

 

                    Gosselin JR, J Leo MD Unavailable         Unavailable

 

                    Gosselin JR, J Leo MD Unavailable         Unavailable

 

                    Gosselin JR, J Leo MD Unavailable         Unavailable

 

                    Gosselin JR, J Leo MD Unavailable         Unavailable

 

                    Gosselin JR, J Leo MD Unavailable         Unavailable

 

                    Gosselin JR, J Leo MD Unavailable         Unavailable

 

                    Gosselin JR, J Leo MD Unavailable         Unavailable

 

                    Gosselin JR, J Leo MD Unavailable         Unavailable

 

                    Gosselin JR, J Leo MD Unavailable         Unavailable

 

                    Gosselin JR, J Leo MD Unavailable         Unavailable

 

                    Gosselin JR, J Leo MD Unavailable         Unavailable

 

                    EscobarHugh MD Unavailable         Unavailable

 

                    EscobarHugh MD Unavailable         Unavailable

 

                    EscobarHugh MD Unavailable         Unavailable

 

                    EscobarHugh MD Unavailable         Unavailable

 

                    EscobarHugh MD Unavailable         Unavailable

 

                    EscobarHugh MD Unavailable         Unavailable

 

                    EscobarHugh MD Unavailable         Unavailable

 

                    EscobarHugh MD Unavailable         Unavailable

 

                    EscobarHugh MD Unavailable         Unavailable

 

                    EscobarHugh MD Unavailable         Unavailable

 

                    EscobarHugh MD Unavailable         Unavailable

 

                    Hugh Escobar MD Unavailable         Unavailable

 

                    Hugh Escobar MD Unavailable         Unavailable

 

                    Hugh Escobar MD Unavailable         Unavailable

 

                    Hugh Escobar MD Unavailable         Unavailable

 

                    EscobarHugh MD Unavailable         Unavailable

 

                    EscobarHugh MD Unavailable         Unavailable

 

                    Hugh Escobar MD Unavailable         Unavailable

 

                    EscobarHugh MD Unavailable         Unavailable

 

                    EscobarHugh MD Unavailable         Unavailable

 

                    Hugh Escobar MD Unavailable         Unavailable

 

                    Hugh Escobar MD Unavailable         Unavailable

 

                    Hugh Escobar MD Unavailable         Unavailable

 

                    Hugh Escobar MD Unavailable         Unavailable

 

                    EscobarHugh MD Unavailable         Unavailable

 

                    EscobarHugh MD Unavailable         Unavailable

 

                    Hugh Escobar MD Unavailable         Unavailable

 

                    EscobarHugh MD Unavailable         Unavailable

 

                    Hugh Escobar MD Unavailable         Unavailable

 

                    Hugh Escobar MD Unavailable         Unavailable

 

                    Hugh Escobar MD Unavailable         Unavailable

 

                    Hugh Escobar MD Unavailable         Unavailable

 

                    Hugh Escobar MD Unavailable         Unavailable

 

                    EscobarHugh MD Unavailable         Unavailable

 

                    EscobarHugh MD Unavailable         Unavailable

 

                    Hugh Escobar MD Unavailable         Unavailable

 

                    EscobarHugh MD Unavailable         Unavailable

 

                    Hugh Escobar MD Unavailable         Unavailable

 

                    Hugh Escobar MD Unavailable         Unavailable

 

                    Hugh Escobar MD Unavailable         Unavailable

 

                    Hugh Escobar MD Unavailable         Unavailable

 

                    Hugh Escobar MD Unavailable         Unavailable

 

                    Hugh Escobar MD Unavailable         Unavailable

 

                    EscobarHugh MD Unavailable         Unavailable

 

                    EscobarHugh MD Unavailable         Unavailable

 

                    Hugh Escobar MD Unavailable         Unavailable

 

                    EscobarHugh MD Unavailable         Unavailable

 

                    Hugh Escobar MD Unavailable         Unavailable

 

                    Hugh Escobar MD Unavailable         Unavailable

 

                    Hugh Escobar MD Unavailable         Unavailable

 

                    Hugh Escobar MD Unavailable         Unavailable

 

                    Hugh Escobar MD Unavailable         Unavailable

 

                    Hugh Escobar MD Unavailable         Unavailable

 

                    EscobarHugh MD Unavailable         Unavailable



                                  



Re-disclosure Warning

          The records that you are about to access may contain information from 
federally-assisted alcohol or drug abuse programs. If such information is 
present, then the following federally mandated warning applies: This information
has been disclosed to you from records protected by federal confidentiality 
rules (42 CFR part 2). The federal rules prohibit you from making any further 
disclosure of this information unless further disclosure is expressly permitted 
by the written consent of the person to whom it pertains or as otherwise 
permitted by 42 CFR part 2. A general authorization for the release of medical 
or other information is NOT sufficient for this purpose. The Federal rules 
restrict any use of the information to criminally investigate or prosecute any 
alcohol or drug abuse patient.The records that you are about to access may 
contain highly sensitive health information, the redisclosure of which is 
protected by Article 27-F of the [facility] Public Health law. If you 
continue you may have access to information: Regarding HIV / AIDS; Provided by 
facilities licensed or operated by the [facility] Office of Mental Health; 
or Provided by the [facility] Office for People With Developmental 
Disabilities. If such information is present, then the following [place] 
mandated warning applies: This information has been disclosed to you from 
confidential records which are protected by state law. State law prohibits you 
from making any further disclosure of this information without the specific 
written consent of the person to whom it pertains, or as otherwise permitted by 
law. Any unauthorized further disclosure in violation of state law may result in
a fine or FDC sentence or both. A general authorization for the release of 
medical or other information is NOT sufficient authorization for further disc
losure.                                                                         
    



Family History

          



             Family Member Name Family Member Gender Family Member Status Date o

f Status 

Description                             Data Source(s)

 

           Unknown    Male       Problem                          [facility] ([facility]

 

           Unknown    Male       Problem                          [facility] ([facility]

 

                                        () 

 

           Unknown    Male       Problem                          [facility] ([facility]

 

                                        () 



                                                                                
                           



Encounters

          



           Encounter  Providers  Location   Date       Indications Data Source(s

)

 

                Outpatient      Attender: Irvin [place] Office 10/

2021 09:15:00 AM 

EDT                                                 [facility]

 

                Outpatient      Attender: Irvin [place] [facility] 2021 01:00:00 PM 

EDT                                                 [facility] ([facility]

 

                Outpatient      Attender: Daniel Cazares/[place]

eindl 2021 

01:00:00 PM EDT                                     [facility]

 

                (Cysto1) Urology                 [address] [place] [address] 2021 

12:00:00 AM EDT                                     [facility] ([facility]

[facility])

 

                Unknown                         [address] [facility] [id number] 2021 12:00:00 AM 

EDT                                                 eCW1 (Count includes the [facility])

 

                Outpatient                      [address] [facility] [id number] 2021 12:00:00 AM

EDT                                                 eCW1 (Count includes the [facility])

 

                Outpatient      Attender: Irvin SANTIAGO [place] Office  01:15:00 PM 

EDT                                                 [facility] ([facility]

 

                Outpatient      Attender: Leo Gosselin JR Chu[place]

dl 2021 

08:55:00 AM EST                                     [facility]

 

                Outpatient      Attender: Irvin SANTIAGO [place] Office  12:15:00 PM 

EST                                                 [facility]

 

                Outpatient      Attender: Daniel Cazares/[place]

eindl 2020 

10:00:00 AM EST                                     [facility]

 

                Office Visit    Attender: Leo Gosselin JR [place]

dl 2020 

08:10:00 AM EST                                     [facility]

 

                Office Visit    Attender: Pacheco Cazares/[place]

ndl 10/20/2020 

10:30:00 AM EDT                                     [facility]

 

                Outpatient      Attender: Irvin SANTIAGO [place] Office  01:30:00 PM 

EDT                                                 [facility] ([facility]



                                                                                
                                                                                
                                              



Immunizations

          



             Vaccine      Date         Status       Description  Data Source(s)

 

             COVID-19 VACCINE Moderna 10/28/2021 12:00:00 AM EDT completed      

           [facility]

 

                                        Vaccine Series Complete: YESThis Data wa

s Submitted to [facility]. 

 

             New in .  IIV4 10/05/2021 09:06:00 AM EDT completed            

     [facility] ([facility]

 

             COVID-19 VACCINE Moderna 2021 12:00:00 AM EST completed      

           [facility]

 

                                        Vaccine Series Complete: YESThis Data wa

s Submitted to [facility]. 

 

             COVID-19 VACCINE Moderna 2020 12:00:00 AM EST completed      

           [facility]

 

                                        Vaccine Series Complete: NOThis Data was

 Submitted to [facility]. 

 

             New in .  IIV4 2020 01:38:00 PM EDT completed            

     [facility] ([facility]

 

                          INFLUENZA VIRUS VACCINE QUADRIVALENT - (6 MOS AN

D UP) 2020 12:00:00 

AM EDT              completed                               [facility]



                                                                                
                                                         



Medications

          



          Medication Brand Name Start Date Product Form Dose      Route     Admi

nistrative 

Instructions Pharmacy Instructions Status     Indications Reaction   Description

 Data 

Source(s)

 

          0.6 mg              2021 12:00:00 AM EDT tablet    10           

       2 TABLETS BY MOUTH FOR THE 1ST 

DOSE, THEN 1 AFTER 1 HOUR, THEN 1 EVERY DAY FOR 7 DAYS AS NEEDED FOR GOUT 2 

TABLETS BY MOUTH FOR THE 1ST DOSE, THEN 1 AFTER 1 HOUR, THEN 1 EVERY DAY FOR 7 
DAYS AS NEEDED FOR GOUT SOLD: 10/04/2021                                        

[facility]

 

          90 mcg/actuation           2021 12:00:00 AM EDT HFA aerosol inha

ler 8                   INHALE TWO

PUFFS BY MOUTH FOUR TIMES A DAY AS NEEDED INHALE TWO PUFFS BY MOUTH FOUR TIMES A

DAY AS NEEDED SOLD: 2021                                        [facility]

 

          90 mcg/actuation           2021 12:00:00 AM EDT HFA aerosol inha

ler 8                   INHALE TWO

PUFFS BY MOUTH FOUR TIMES A DAY AS NEEDED INHALE TWO PUFFS BY MOUTH FOUR TIMES A

DAY AS NEEDED SOLD: 2021                                        [facility]

 

          10 mg               2021 12:00:00 AM EDT tablet    40           

       TAKE 4 TABLETS BY MOUTH ONCE 

DAILY FOR 4 DAYS THEN 3 ONCE DAILY FOR 4 DAYS THEN 2 TABLETS BY MOUTH EVERY DAY 
FOR 4 DAYS THEN 1 TABLET BY MOUTH EVERY DAY FOR 4 DAYS AND STOP TAKE 4 TABLETS 

BY MOUTH ONCE DAILY FOR 4 DAYS THEN 3 ONCE DAILY FOR 4 DAYS THEN 2 TABLETS BY 
MOUTH EVERY DAY FOR 4 DAYS THEN 1 TABLET BY MOUTH EVERY DAY FOR 4 DAYS AND STOP 

SOLD: 2021                                                 [facility]

 

        Prednisone 10 MG Oral Tablet Prednisone 2021 12:00:00 AM EDT      

           ORAL                    

active                                                          MEDENT ([facility], )

 

          1.5 mg/0.5 mL           2021 12:00:00 AM EDT pen injector 2     

              INJECT 1 

SUBCUTANEOUSLY ONCE A WEEK INJECT 1 SUBCUTANEOUSLY ONCE A WEEK SOLD: 2021 

   

                                                            [facility]

 

          1.5 mg/0.5 mL           2021 12:00:00 AM EDT pen injector 2     

              INJECT 1 

SUBCUTANEOUSLY ONCE A WEEK INJECT 1 SUBCUTANEOUSLY ONCE A WEEK SOLD: 2021 

   

                                                            [facility]

 

          1.5 mg/0.5 mL           2021 12:00:00 AM EDT pen injector 2     

              INJECT 1 

SUBCUTANEOUSLY ONCE A WEEK INJECT 1 SUBCUTANEOUSLY ONCE A WEEK SOLD: 10/07/2021 

   

                                                            [facility]

 

          1.5 mg/0.5 mL           2021 12:00:00 AM EDT pen injector 2     

              INJECT 1 

SUBCUTANEOUSLY ONCE A WEEK INJECT 1 SUBCUTANEOUSLY ONCE A WEEK SOLD: 2021 

   

                                                            [facility]

 

                    vardenafil 20 MG Oral Tablet Vardenafil HCl 20 MG Vardenafil

 HCl 20 MG 

2021 12:00:00 AM EDT                                    active            

   Vardenafil HCl 20 MG eCW1 

(Formerly [facility])

 

                    vardenafil 20 MG Oral Tablet Vardenafil HCl 20 MG Vardenafil

 HCl 20 MG 

2021 12:00:00 AM EDT                                    active            

   Vardenafil HCl 20 MG eCW1 

(Formerly [facility])

 

          20 mg               2021 12:00:00 AM EDT tablet    6            

       TAKE 1 TABLET BY MOUTH 60 MINUTES 

BEFORE SEXUAL ACTIVITY AS NEEDED        TAKE 1 TABLET BY MOUTH 60 MINUTES BEFORE

 SEXUAL

ACTIVITY AS NEEDED SOLD: 10/18/2021                                        [facility]

 

                    vardenafil 20 MG Oral Tablet Vardenafil HCl 20 MG Vardenafil

 HCl 20 MG 

2021 12:00:00 AM EDT                                    active            

   Vardenafil HCl 20 MG eCW1 

(Formerly [facility])

 

                    vardenafil 20 MG Oral Tablet Vardenafil HCl 20 MG Vardenafil

 HCl 20 MG 

2021 12:00:00 AM EDT                                    active            

   Vardenafil HCl 20 MG eCW1 

([facility])

 

          20 mg               2021 12:00:00 AM EDT tablet    6            

       TAKE 1 TABLET BY MOUTH 60 MINUTES 

BEFORE SEXUAL ACTIVITY AS NEEDED        TAKE 1 TABLET BY MOUTH 60 MINUTES BEFORE

 SEXUAL

ACTIVITY AS NEEDED SOLD: 2021                                        [facility]

 

          0.5 mg (11)- 1 mg (42)           2021 12:00:00 AM EDT tablets,do

se pack 53                  TAKE 

BY MOUTH AS DIRECTED TAKE BY MOUTH AS DIRECTED SOLD: 2021                 

                 [facility]

 

          90 mcg/actuation           2021 12:00:00 AM EDT HFA aerosol inha

ler 8                   INHALE TWO

PUFFS BY MOUTH FOUR TIMES A DAY AS NEEDED INHALE TWO PUFFS BY MOUTH FOUR TIMES A

DAY AS NEEDED SOLD: 2021                                        [facility]

 

          90 mcg/actuation           2021 12:00:00 AM EDT HFA aerosol inha

ler 8                   INHALE TWO

PUFFS BY MOUTH FOUR TIMES A DAY AS NEEDED INHALE TWO PUFFS BY MOUTH FOUR TIMES A

DAY AS NEEDED SOLD: 2021                                        [facility]

 

          90 mcg/actuation           2021 12:00:00 AM EDT HFA aerosol inha

ler 8                   INHALE TWO

PUFFS BY MOUTH FOUR TIMES A DAY AS NEEDED INHALE TWO PUFFS BY MOUTH FOUR TIMES A

DAY AS NEEDED SOLD: 2021                                        [facility]

 

          90 mcg/actuation           2021 12:00:00 AM EDT HFA aerosol inha

ler 8                   INHALE TWO

PUFFS BY MOUTH FOUR TIMES A DAY AS NEEDED INHALE TWO PUFFS BY MOUTH FOUR TIMES A

DAY AS NEEDED SOLD: 2021                                        [facility]

 

          90 mcg/actuation           2021 12:00:00 AM EDT HFA aerosol inha

ler 8                   INHALE TWO

PUFFS BY MOUTH FOUR TIMES A DAY AS NEEDED INHALE TWO PUFFS BY MOUTH FOUR TIMES A

DAY AS NEEDED SOLD: 08/10/2021                                        [facility]

 

          90 mcg/actuation           2021 12:00:00 AM EDT HFA aerosol inha

ler 8                   INHALE TWO

PUFFS BY MOUTH FOUR TIMES A DAY AS NEEDED INHALE TWO PUFFS BY MOUTH FOUR TIMES A

DAY AS NEEDED SOLD: 2021                                        [facility]

 

             Moderna Covid-19 Vaccine Moderna Covid-19 Vaccine 2021 12:00:

00 AM EDT               

                                active                          MED[facility] ([facility]

 

          800-160 mg           2021 12:00:00 AM EDT tablet    20          

        TAKE ONE TABLET BY MOUTH 

TWICE A DAY FOR 10 DAYS   TAKE ONE TABLET BY MOUTH TWICE A DAY FOR 10 DAYS SOLD:

 

2021                                                      [facility]

 

                          Sulfamethoxazole 800 MG / Trimethoprim 160 MG Oral Tab

let 

Sulfamethoxazole/Trimethoprim DS 2021 12:00:00 AM EDT                     

ORAL                          

completed                                                       [facility] ([facility]

 

                    60 ACTUAT Albuterol 0.09 MG/ACTUAT Metered Dose Inhaler Albu

terol Sulfate HFA 

2021 12:00:00 AM EDT               RESPIRATORY               active       

               MEDENT ([facility], )

 

          0.05 %              2021 12:00:00 AM EST cream     15           

       APPLY TO AFFECTED AREA(S) ON FACE

SPARINGLY TWO TIMES A DAY FOR 2 DAYS THEN AS NEEDED FOR SCALING APPLY TO 

AFFECTED AREA(S) ON FACE SPARINGLY TWO TIMES A DAY FOR 2 DAYS THEN AS NEEDED FOR
SCALING      SOLD: 2021                                        [facility]

[facility]

 

          50 mcg/actuation           2021 12:00:00 AM EST spray,suspension

 48                  SPRAY TWO 

SPRAYS IN EACH NOSTRIL EVERY DAY SPRAY TWO SPRAYS IN EACH NOSTRIL EVERY DAY 

SOLD: 2021                                                 [facility]

 

          Flonase Allergy Relief Flonase Allergy Relief 2021 12:00:00 AM E

ST                                

                      active                                      MEDENT ([facility]

 

          100 mg              2020 12:00:00 AM EST tablet    12           

       TAKE ONE TABLET BY MOUTH EVERY 

DAY AS NEEDED TAKE ONE TABLET BY MOUTH EVERY DAY AS NEEDED SOLD: 2021     

                      

                                        [facility]

 

          100 mg              2020 12:00:00 AM EST tablet    12           

       TAKE ONE TABLET BY MOUTH EVERY 

DAY AS NEEDED TAKE ONE TABLET BY MOUTH EVERY DAY AS NEEDED SOLD: 2021     

                      

                                        [facility]

 

          100 mg              2020 12:00:00 AM EST tablet    12           

       TAKE ONE TABLET BY MOUTH EVERY 

DAY AS NEEDED TAKE ONE TABLET BY MOUTH EVERY DAY AS NEEDED SOLD: 2021     

                      

                                        [facility]

 

          100 mg              2020 12:00:00 AM EST tablet    12           

       TAKE ONE TABLET BY MOUTH EVERY 

DAY AS NEEDED TAKE ONE TABLET BY MOUTH EVERY DAY AS NEEDED SOLD: 10/04/2021     

                      

                                        [facility]

 

           sildenafil 100 MG Oral Tablet [Viagra] Viagra     2020 12:00:00

 AM EST                       

ORAL                          active                                  MEDENT ([facility]

 

          17.5-3.13-1.6 gram           2020 12:00:00 AM EST recon soln 354

                 USE AS DIRECTED

 BY PHYSICIAN USE AS DIRECTED BY PHYSICIAN SOLD: 2021                     

             [facility]

 

        Suprep Bowel Prep Kit Suprep Bowel Prep Kit 2020 12:00:00 AM EST  

                                

             completed                                           MED[facility] ([facility]

 

          1.5 mg/0.5 mL           2020 12:00:00 AM EST pen injector 2     

              INJECT 1 

SUBCUTANEOUSLY ONCE A WEEK INJECT 1 SUBCUTANEOUSLY ONCE A WEEK SOLD: 2021 

   

                                                            [facility]

 

          1.5 mg/0.5 mL           2020 12:00:00 AM EST pen injector 2     

              INJECT 1 

SUBCUTANEOUSLY ONCE A WEEK INJECT 1 SUBCUTANEOUSLY ONCE A WEEK SOLD: 2021 

   

                                                            [facility]

 

          1.5 mg/0.5 mL           2020 12:00:00 AM EST pen injector 2     

              INJECT 1 

SUBCUTANEOUSLY ONCE A WEEK INJECT 1 SUBCUTANEOUSLY ONCE A WEEK SOLD: 2020 

   

                                                            [facility]

 

          1.5 mg/0.5 mL           2020 12:00:00 AM EST pen injector 2     

              INJECT 1 

SUBCUTANEOUSLY ONCE A WEEK INJECT 1 SUBCUTANEOUSLY ONCE A WEEK SOLD: 2021 

   

                                                            [facility]

 

          1.5 mg/0.5 mL           2020 12:00:00 AM EST pen injector 2     

              INJECT 1 

SUBCUTANEOUSLY ONCE A WEEK INJECT 1 SUBCUTANEOUSLY ONCE A WEEK SOLD: 2021 

   

                                                            [facility]

 

          90 mcg/actuation           10/20/2020 12:00:00 AM EDT HFA aerosol inha

ler 8                   INHALE TWO

 PUFFS BY MOUTH FOUR TIMES A DAY AS NEEDED INHALE TWO PUFFS BY MOUTH FOUR TIMES 

A DAY AS NEEDED SOLD: 10/22/2020                                        [facility]

 

          90 mcg/actuation           10/20/2020 12:00:00 AM EDT HFA aerosol inha

ler 8                   INHALE TWO

 PUFFS BY MOUTH FOUR TIMES A DAY AS NEEDED INHALE TWO PUFFS BY MOUTH FOUR TIMES 

A DAY AS NEEDED SOLD: 2020                                        [facility]

 

          90 mcg/actuation           10/20/2020 12:00:00 AM EDT HFA aerosol inha

ler 8                   INHALE TWO

 PUFFS BY MOUTH FOUR TIMES A DAY AS NEEDED INHALE TWO PUFFS BY MOUTH FOUR TIMES 

A DAY AS NEEDED SOLD: 2021                                        [facility]

 

          90 mcg/actuation           10/20/2020 12:00:00 AM EDT HFA aerosol inha

ler 8                   INHALE TWO

 PUFFS BY MOUTH FOUR TIMES A DAY AS NEEDED INHALE TWO PUFFS BY MOUTH FOUR TIMES 

A DAY AS NEEDED SOLD: 2020                                        [facility]

 

          90 mcg/actuation           10/20/2020 12:00:00 AM EDT HFA aerosol inha

ler 8                   INHALE TWO

 PUFFS BY MOUTH FOUR TIMES A DAY AS NEEDED INHALE TWO PUFFS BY MOUTH FOUR TIMES 

A DAY AS NEEDED SOLD: 2021                                        [facility]

 

          90 mcg/actuation           10/20/2020 12:00:00 AM EDT HFA aerosol inha

ler 8                   INHALE TWO

 PUFFS BY MOUTH FOUR TIMES A DAY AS NEEDED INHALE TWO PUFFS BY MOUTH FOUR TIMES 

A DAY AS NEEDED SOLD: 2021                                        [facility]

 

          160-4.5 mcg/actuation           10/15/2020 12:00:00 AM EDT HFA aerosol

 inhaler 10                  

INHALE TWO PUFFS BY MOUTH TWICE A DAY INHALE TWO PUFFS BY MOUTH TWICE A DAY 

SOLD: 10/17/2020                                                 [facility]

 

          5-325 mg            10/06/2020 12:00:00 AM EDT tablet    30           

       TAKE ONE TO TWO TABLETS BY 

MOUTH EVERY 6 HOURS AS NEEDED FOR PAIN MAXIMUM DAILY DOSE = 6 TABLETS TAKE ONE 

TO TWO TABLETS BY MOUTH EVERY 6 HOURS AS NEEDED FOR PAIN MAXIMUM DAILY DOSE = 6 
TABLETS      SOLD: 10/06/2020                                        [facility]

[facility]

 

                          Acetaminophen 325 MG / Oxycodone Hydrochloride 5 MG Or

al Tablet [Percocet] 

Percocet 10/06/2020 12:00:00 AM EDT             ORAL              completed     

              MEDENT 

([facility], )

 

          0.5 mg (11)- 1 mg (42)           2020 12:00:00 AM EDT tablets,do

se pack 53                  TAKE 

BY MOUTH PER PACKAGE INSTRUCTIONS, CALL OFFICE FOR REFILLS TAKE BY MOUTH PER 

PACKAGE INSTRUCTIONS, CALL OFFICE FOR REFILLS SOLD: 10/01/2020                  

                      [facility]

 

          90 mcg/actuation           2020 12:00:00 AM EDT HFA aerosol inha

ler 8                   INHALE TWO

 PUFFS BY MOUTH FOUR TIMES A DAY AS NEEDED INHALE TWO PUFFS BY MOUTH FOUR TIMES 

A DAY AS NEEDED SOLD: 2020                                        [facility]

 

          160-4.5 mcg/actuation           2020 12:00:00 AM EDT HFA aerosol

 inhaler 10                  

INHALE TWO PUFFS BY MOUTH TWICE A DAY INHALE TWO PUFFS BY MOUTH TWICE A DAY 

SOLD: 2020                                                 [facility]

 

          1.5 mg/0.5 mL           2020 12:00:00 AM EDT pen injector 2     

              INJECT 1 

SUBCUTANEOUSLY ONCE A WEEK INJECT 1 SUBCUTANEOUSLY ONCE A WEEK SOLD: 10/01/2020 

   

                                                            [facility]



                                                                                
                                                                                
                                                                                
                                                                                
                                                                                
                                                                                
                                                                                
                                      



Insurance Providers

          



             Payer name   Policy type / Coverage type Policy ID    Covered party

 ID Covered 

party's relationship to redd Policy Redd             Plan Information

 

                [place] Plan-[employer] Medigap Part B  [id number]       

[id number] Self                                    8

[id number]

 

                [place] Plan-[employer] Medigap Part B  [id number]       

[id number] Self                                    8

[id number]

 

                [place] Plan-[employer] Medigap Part B  [id number]       

[id number] Self                                    8

[id number]

 

                Medicare [facility] Medicare Primary [id number]      

[id number] Self                                    

[id number]

 

          MEDICARE            [id number]           SP                  [id number]

A

 

                Medicare  [place]  Medicare Primary [id number]      .[id number]

[id number]

                    Self                                    [id number]

 

          Medicare  C         [id number]           SELF                [id number]

A

 

                Medicare [facility] Medicare Primary [id number]     

[id number] Self                                    

[id number]

 

          MEDICARE            [id number]           Caty                 [id number]

A

 

          [facility] MEDICARE DIVISION           [id number]           S            

       [id number]

 

          [place]ARE [place]           [id number]           S                  

 [id number]

 

          MEDICARE  A         [id number]           Self                [id number]

A

 

          [place] Plan F         [id number]           SELF                [id number]

4

 

          [employer] Blue Shield P         [id number]           SPOUSE        

      [id number]

 

          MEDICARE            [id number]           SP                  [id number]

A

 

          DME Jurisdiction A [place] [facility]         [id number]           SELF           

     [id number]

 

          BCBS EMPIRE [place]           [id number]           SP            

      [id number]

 

          [employer]ARE           [id number]           SP                  89

[id number]

 

          [employer] BCBS           [id number]           Caty                 YLS

[id number]

 

          [employer] HE[employer]ARE           [id number]           SP                  89

[id number]

 

          UNITED [employer]ARE           [id number]           S                   89

[id number]

 

          BCBS EMPIRE           [id number]           S                   [id number]

[id number]

 

          [employer]ARE           [id number]           SP                  89

[id number]

 

          BCBS EMPIRE GR[place] DIV           [id number]           SP            

      [id number]

 

          EMPIRE PLAN C U         [id number]           Self                [id number]

[id number]

 

          [employer]ARE           [id number]           SP                  89

[id number]

 

          [employer]BS EMPIRE [employer] DIV           [id number]           SP            

      [id number]

 

          [employer]BS EMPIRE [employer] DIV           [id number]           SP            

      [id number]

 

          [employer]ARE           [id number]           SP                  89

[id number]

 

          MEDICARE PART A             -O/P           [id number]           18    

              [id number]

 

          [place] [employer] Medigap Part B           [id number]     Family Dependent     

       

 

          [employer] [facility] [place] Medigap Part B           [id number]     Self      

           

 

          Medicare [facility] Medicare Primary           [id number]     Self        

         

 

          UNITED HEALTHCARE O         [id number] S                   89

[id number]

 

          [employer] BCBS S         [id number] S                   [id number]

[id number]

 

          BLUE CROSS OTHER 1           [id number]           WI2               

  [id number]

 

          MEDICARE            [id number]           SP                  [id number]

[id number]

 

          [place]         [id number] P                   [id number]

[id number]

 

          [facility] DIV           [id number]           SP            

      [id number]

 

          [facility]           [id number]           SP                  89

[id number]

 

          [facility]           [id number]           SP                  89

[id number]

 

          MEDICARE            [id number]           SP                  [id number]

A

 

                [employer]     Health Maintenance Organization (HMO) [id number]

19    

[id number] Family Dependent                        

[id number]

 

                [facility] Part B  [id number]    

[id number] Family Dependent                        

[id number]

 

                [facility] Part B  [id number]       

[id number] Self                                    

[id number]

 

                Medicare (Part B) Medicare Primary [id number]     

[id number] Self                                    8

[id number]

 

             [place] BC/BS [facility] Part B [id number]

[id number] 

Family Dependent                                    [id number]

 

                [place] Plan-[employer] Medigap Part B  [id number]       

[id number] Self                                    0

[id number]

 

                Medicare (Part B) Medicare Primary [id number]     

[id number] Self                                    8

[id number]

 

                Medicare (Part B) Medicare Primary [id number]     

[id number] Self                                    8

[id number]

 

          MEDICARE            [id number]           SP                  [id number]

[id number]

 

          [employer]ARE           [id number]           S                   89

[id number]

 

          BCBS EMPIRE           [id number]           S                   [id number]

[id number]

 

          [place] [employer]           [id number]           S            

       [id number]

 

          MEDICARE - [place]           [id number]           S                  

 [id number]

 

          BCBS EMPIRE [place] DIV           UNAVAILABLE                          

     UNAVAILABLE

 

             [place] BCBS  Medigap Part B [id number]

[id number] 

Family Dependent                                    [id number]

 

           [place] Plan Medigap Part B [id number]  [id number]

[id number] Self       

                                        [id number]

 

          [place] [facility] O         [id number] S                   89

[id number]

 

          MEDICARE           [id number] S                   [id number]

A

 

          [employer]BS EMPIRE [employer] DIV           [id number]           SP            

      [id number]

 

          MEDICARE            [id number]           SP                  [id number]

A

 

                Medicare [facility] Medicare Primary [id number]      

[id number] Self                                    

[id number]

 

          EMPIRE ([facility]) O         [id number] S                   8

[id number]

 

          [place] [facility]           [id number]           SP                  89

[id number]

 

          [facility]  -O/P           [id number]           18     

             [id number]



                                                                                
                                                                                
                                                                                
                                                                                
                                                                                
                                                                                
                                                                                
                                                                                
                                                                                
                  



Problems, Conditions, and Diagnoses

          No Information                                                        
                                



Surgeries/Procedures

          



             Procedure    Description  Date         Indications  Data Source(s)

 

             OFFICE OUTPATIENT VISIT 25 MINUTES              10/05/2021 12:00:00

 AM EDT              [facility] ([facility]

 

                Capillary Blood Collection Finger, Heel, Ear Stick              

   2021 12:00:00 AM EDT 

                                        [facility] ([facility]

)

 

             OFFICE OUTPATIENT VISIT 25 MINUTES              2021 12:00:00

 AM EDWILLIE PIRES ([facility]

 

             Spirometry                2021 12:00:00 AM [address] ([facility], 

)

 

                    Medication: Lidocaine HCl 2% Jelly 5mL Intravesically       

              2021 12:00:00 AM 

EDT                                                 [id number] (Count includes the [facility])

 

             OFFICE OUTPATIENT VISIT 25 MINUTES              2021 12:00:00

 AM EDT              [facility] ([facility]

 

             Colonoscopy W/ Poly              2021 12:00:00 AM EST        

      [facility] ([facility], )

 

             Spirometry                2020 12:00:00 AM EST              M

EDENT ([facility], 

)

 

                Laparoscopic Incisional Hernia W/ Mesh, Reducible               

  10/06/2020 12:00:00 AM EDT  

                                        [facility] ([facility], )

 

             Electrocardiogram Complete              2020 12:00:00 AM EDT 

             [facility] ([facility]



                                                                                
                                                                                
                  



Results

          



                    ID                  Date                Data Source

 

                    [id number]         10/05/2021 09:35:00 AM EDT [facility] ([facility]









          Name      Value     Range     Interpretation Code Description Data Khloe

rce(s) Supporting 

Document(s)

 

          Trig      114 mg/dL                         [facility] ([facility]  

 

                                        CHRONIC KIDNEY DISEASE STAGING PER NKF: 

  MALE GFR INTERPRETATION:  20-49 YRS:  

   >60 mL/min  Normal 50-59 YRS:     >56 mL/min  Normal 60-69 YRS:     >49 
mL/min  Normal 70-79 YRS:     >42 mL/min  Normal 80 and above  >35 mL/min  
Normal   FEMALE GRF INTERPRETATION:  20-39 YRS:    >60 mL/min  Normal 40-49 YRS:
    >58 mL/min  Normal 50-59 YRS:    >51 mL/min  Normal 60-69 YRS:    >45 mL/min
  Normal 70-79 YRS:    >39 mL/min  Normal 80 and above >32 mL/min  
NormalCLASSIFICATION            CHOLESTEROL FOR ADULTS       
CHILDREN/ADOLESCENTS*   DESIRABLE:                <200 MG/DL                   
<170 MG/DL  BORDER-LINE HIGH RISK:    200-239 MG/DL                170-199 MG/DL
  HIGH RISK:                >240 MG/DL                   >200 MG/DL    CLASS. 
FOR PRIMARY LDL CHOL PREVENTION:        LDL CHOL-CHILD/ADOLESCENTS*   DESIRABLE:
              <130 MG/DL              <110 MG/DL  BORDERLINE-HIGH RISK:   130-
159 MG/DL           110-129 MG/DL  HIGH RISK:              >160 MG/DL           
   >130 MG/DL   *CHILDREN AND ADOLESCENTS REPRESENTS INDIVIDUALA AGED 2-19 YEARS
 EXCLUSIVE. 

 

           Chol       208 mg/dL  0-200      Above high normal            [facility] 

([facility]                                    

 

                                        CHRONIC KIDNEY DISEASE STAGING PER NKF: 

  MALE GFR INTERPRETATION:  20-49 YRS:  

   >60 mL/min  Normal 50-59 YRS:     >56 mL/min  Normal 60-69 YRS:     >49 
mL/min  Normal 70-79 YRS:     >42 mL/min  Normal 80 and above  >35 mL/min  
Normal   FEMALE GRF INTERPRETATION:  20-39 YRS:    >60 mL/min  Normal 40-49 YRS:
    >58 mL/min  Normal 50-59 YRS:    >51 mL/min  Normal 60-69 YRS:    >45 mL/min
  Normal 70-79 YRS:    >39 mL/min  Normal 80 and above >32 mL/min  
NormalCLASSIFICATION            CHOLESTEROL FOR ADULTS       
CHILDREN/ADOLESCENTS*   DESIRABLE:                <200 MG/DL                   
<170 MG/DL  BORDER-LINE HIGH RISK:    200-239 MG/DL                170-199 MG/DL
  HIGH RISK:                >240 MG/DL                   >200 MG/DL    CLASS. 
FOR PRIMARY LDL CHOL PREVENTION:        LDL CHOL-CHILD/ADOLESCENTS*   DESIRABLE:
              <130 MG/DL              <110 MG/DL  BORDERLINE-HIGH RISK:   130-
159 MG/DL           110-129 MG/DL  HIGH RISK:              >160 MG/DL           
   >130 MG/DL   *CHILDREN AND ADOLESCENTS REPRESENTS INDIVIDUALA AGED 2-19 YEARS
 EXCLUSIVE. 

 

           LDL_C      145 Calc        Above high normal            [facility] 

([facility]                                    

 

                                        CHRONIC KIDNEY DISEASE STAGING PER NKF: 

  MALE GFR INTERPRETATION:  20-49 YRS:  

   >60 mL/min  Normal 50-59 YRS:     >56 mL/min  Normal 60-69 YRS:     >49 
mL/min  Normal 70-79 YRS:     >42 mL/min  Normal 80 and above  >35 mL/min  
Normal   FEMALE GRF INTERPRETATION:  20-39 YRS:    >60 mL/min  Normal 40-49 YRS:
    >58 mL/min  Normal 50-59 YRS:    >51 mL/min  Normal 60-69 YRS:    >45 mL/min
  Normal 70-79 YRS:    >39 mL/min  Normal 80 and above >32 mL/min  
NormalCLASSIFICATION            CHOLESTEROL FOR ADULTS       
CHILDREN/ADOLESCENTS*   DESIRABLE:                <200 MG/DL                   
<170 MG/DL  BORDER-LINE HIGH RISK:    200-239 MG/DL                170-199 MG/DL
  HIGH RISK:                >240 MG/DL                   >200 MG/DL    CLASS. 
FOR PRIMARY LDL CHOL PREVENTION:        LDL CHOL-CHILD/ADOLESCENTS*   DESIRABLE:
              <130 MG/DL              <110 MG/DL  BORDERLINE-HIGH RISK:   130-
159 MG/DL           110-129 MG/DL  HIGH RISK:              >160 MG/DL           
   >130 MG/DL   *CHILDREN AND ADOLESCENTS REPRESENTS INDIVIDUALA AGED 2-19 YEARS
 EXCLUSIVE. 

 

          Cho/HDL Ratio 5.3 CALC                                [facility] ([facility]  

 

                                        CHRONIC KIDNEY DISEASE STAGING PER NKF: 

  MALE GFR INTERPRETATION:  20-49 YRS:  

   >60 mL/min  Normal 50-59 YRS:     >56 mL/min  Normal 60-69 YRS:     >49 
mL/min  Normal 70-79 YRS:     >42 mL/min  Normal 80 and above  >35 mL/min  
Normal   FEMALE GRF INTERPRETATION:  20-39 YRS:    >60 mL/min  Normal 40-49 YRS:
    >58 mL/min  Normal 50-59 YRS:    >51 mL/min  Normal 60-69 YRS:    >45 mL/min
  Normal 70-79 YRS:    >39 mL/min  Normal 80 and above >32 mL/min  
NormalCLASSIFICATION            CHOLESTEROL FOR ADULTS       
CHILDREN/ADOLESCENTS*   DESIRABLE:                <200 MG/DL                   
<170 MG/DL  BORDER-LINE HIGH RISK:    200-239 MG/DL                170-199 MG/DL
  HIGH RISK:                >240 MG/DL                   >200 MG/DL    CLASS. 
FOR PRIMARY LDL CHOL PREVENTION:        LDL CHOL-CHILD/ADOLESCENTS*   DESIRABLE:
              <130 MG/DL              <110 MG/DL  BORDERLINE-HIGH RISK:   130-
159 MG/DL           110-129 MG/DL  HIGH RISK:              >160 MG/DL           
   >130 MG/DL   *CHILDREN AND ADOLESCENTS REPRESENTS INDIVIDUALA AGED 2-19 YEARS
 EXCLUSIVE. 

 

           Cholesterol in HDL [Mass/volume] in Serum or Plasma 40 mg/dL   35-55 

                           [facility] 

([facility]       

 

                                        CHRONIC KIDNEY DISEASE STAGING PER NKF: 

  MALE GFR INTERPRETATION:  20-49 YRS:  

   >60 mL/min  Normal 50-59 YRS:     >56 mL/min  Normal 60-69 YRS:     >49 
mL/min  Normal 70-79 YRS:     >42 mL/min  Normal 80 and above  >35 mL/min  
Normal   FEMALE GRF INTERPRETATION:  20-39 YRS:    >60 mL/min  Normal 40-49 YRS:
    >58 mL/min  Normal 50-59 YRS:    >51 mL/min  Normal 60-69 YRS:    >45 mL/min
  Normal 70-79 YRS:    >39 mL/min  Normal 80 and above >32 mL/min  
NormalCLASSIFICATION            CHOLESTEROL FOR ADULTS       
CHILDREN/ADOLESCENTS*   DESIRABLE:                <200 MG/DL                   
<170 MG/DL  BORDER-LINE HIGH RISK:    200-239 MG/DL                170-199 MG/DL
  HIGH RISK:                >240 MG/DL                   >200 MG/DL    CLASS. 
FOR PRIMARY LDL CHOL PREVENTION:        LDL CHOL-CHILD/ADOLESCENTS*   DESIRABLE:
              <130 MG/DL              <110 MG/DL  BORDERLINE-HIGH RISK:   130-
159 MG/DL           110-129 MG/DL  HIGH RISK:              >160 MG/DL           
   >130 MG/DL   *CHILDREN AND ADOLESCENTS REPRESENTS INDIVIDUALA AGED 2-19 YEARS
 EXCLUSIVE. 









                    ID                  Date                Data Source

 

                    [id number]         10/05/2021 09:35:00 AM EDT [facility] ([facility]

[facility]









          Name      Value     Range     Interpretation Code Description Data Khloe

rce(s) Supporting 

Document(s)

 

          Glu       102 mg/dL                         [facility] ([facility]  

 

                                        CHRONIC KIDNEY DISEASE STAGING PER NKF: 

  MALE GFR INTERPRETATION:  20-49 YRS:  

   >60 mL/min  Normal 50-59 YRS:     >56 mL/min  Normal 60-69 YRS:     >49 
mL/min  Normal 70-79 YRS:     >42 mL/min  Normal 80 and above  >35 mL/min  
Normal   FEMALE GRF INTERPRETATION:  20-39 YRS:    >60 mL/min  Normal 40-49 YRS:
    >58 mL/min  Normal 50-59 YRS:    >51 mL/min  Normal 60-69 YRS:    >45 mL/min
  Normal 70-79 YRS:    >39 mL/min  Normal 80 and above >32 mL/min  
NormalCLASSIFICATION            CHOLESTEROL FOR ADULTS       
CHILDREN/ADOLESCENTS*   DESIRABLE:                <200 MG/DL                   
<170 MG/DL  BORDER-LINE HIGH RISK:    200-239 MG/DL                170-199 MG/DL
  HIGH RISK:                >240 MG/DL                   >200 MG/DL    CLASS. 
FOR PRIMARY LDL CHOL PREVENTION:        LDL CHOL-CHILD/ADOLESCENTS*   DESIRABLE:
              <130 MG/DL              <110 MG/DL  BORDERLINE-HIGH RISK:   130-
159 MG/DL           110-129 MG/DL  HIGH RISK:              >160 MG/DL           
   >130 MG/DL   *CHILDREN AND ADOLESCENTS REPRESENTS INDIVIDUALA AGED 2-19 YEARS
 EXCLUSIVE. 

 

          BUN       22 mg/dL  8-23                          [facility] ([facility]

[facility]  

 

                                        CHRONIC KIDNEY DISEASE STAGING PER NKF: 

  MALE GFR INTERPRETATION:  20-49 YRS:  

   >60 mL/min  Normal 50-59 YRS:     >56 mL/min  Normal 60-69 YRS:     >49 
mL/min  Normal 70-79 YRS:     >42 mL/min  Normal 80 and above  >35 mL/min  
Normal   FEMALE GRF INTERPRETATION:  20-39 YRS:    >60 mL/min  Normal 40-49 YRS:
    >58 mL/min  Normal 50-59 YRS:    >51 mL/min  Normal 60-69 YRS:    >45 mL/min
  Normal 70-79 YRS:    >39 mL/min  Normal 80 and above >32 mL/min  
NormalCLASSIFICATION            CHOLESTEROL FOR ADULTS       
CHILDREN/ADOLESCENTS*   DESIRABLE:                <200 MG/DL                   
<170 MG/DL  BORDER-LINE HIGH RISK:    200-239 MG/DL                170-199 MG/DL
  HIGH RISK:                >240 MG/DL                   >200 MG/DL    CLASS. 
FOR PRIMARY LDL CHOL PREVENTION:        LDL CHOL-CHILD/ADOLESCENTS*   DESIRABLE:
              <130 MG/DL              <110 MG/DL  BORDERLINE-HIGH RISK:   130-
159 MG/DL           110-129 MG/DL  HIGH RISK:              >160 MG/DL           
   >130 MG/DL   *CHILDREN AND ADOLESCENTS REPRESENTS INDIVIDUALA AGED 2-19 YEARS
 EXCLUSIVE. 

 

          Na        140 mmol/L 136-145                       [facility] ([facility]  

 

                                        CHRONIC KIDNEY DISEASE STAGING PER NKF: 

  MALE GFR INTERPRETATION:  20-49 YRS:  

   >60 mL/min  Normal 50-59 YRS:     >56 mL/min  Normal 60-69 YRS:     >49 
mL/min  Normal 70-79 YRS:     >42 mL/min  Normal 80 and above  >35 mL/min  
Normal   FEMALE GRF INTERPRETATION:  20-39 YRS:    >60 mL/min  Normal 40-49 YRS:
    >58 mL/min  Normal 50-59 YRS:    >51 mL/min  Normal 60-69 YRS:    >45 mL/min
  Normal 70-79 YRS:    >39 mL/min  Normal 80 and above >32 mL/min  
NormalCLASSIFICATION            CHOLESTEROL FOR ADULTS       
CHILDREN/ADOLESCENTS*   DESIRABLE:                <200 MG/DL                   
<170 MG/DL  BORDER-LINE HIGH RISK:    200-239 MG/DL                170-199 MG/DL
  HIGH RISK:                >240 MG/DL                   >200 MG/DL    CLASS. 
FOR PRIMARY LDL CHOL PREVENTION:        LDL CHOL-CHILD/ADOLESCENTS*   DESIRABLE:
              <130 MG/DL              <110 MG/DL  BORDERLINE-HIGH RISK:   130-
159 MG/DL           110-129 MG/DL  HIGH RISK:              >160 MG/DL           
   >130 MG/DL   *CHILDREN AND ADOLESCENTS REPRESENTS INDIVIDUALA AGED 2-19 YEARS
 EXCLUSIVE. 

 

          BUN/Creatinine Ratio 19.2 CALC                               [facility] ([facility]  

 

                                        CHRONIC KIDNEY DISEASE STAGING PER NKF: 

  MALE GFR INTERPRETATION:  20-49 YRS:  

   >60 mL/min  Normal 50-59 YRS:     >56 mL/min  Normal 60-69 YRS:     >49 
mL/min  Normal 70-79 YRS:     >42 mL/min  Normal 80 and above  >35 mL/min  
Normal   FEMALE GRF INTERPRETATION:  20-39 YRS:    >60 mL/min  Normal 40-49 YRS:
    >58 mL/min  Normal 50-59 YRS:    >51 mL/min  Normal 60-69 YRS:    >45 mL/min
  Normal 70-79 YRS:    >39 mL/min  Normal 80 and above >32 mL/min  
NormalCLASSIFICATION            CHOLESTEROL FOR ADULTS       
CHILDREN/ADOLESCENTS*   DESIRABLE:                <200 MG/DL                   
<170 MG/DL  BORDER-LINE HIGH RISK:    200-239 MG/DL                170-199 MG/DL
  HIGH RISK:                >240 MG/DL                   >200 MG/DL    CLASS. 
FOR PRIMARY LDL CHOL PREVENTION:        LDL CHOL-CHILD/ADOLESCENTS*   DESIRABLE:
              <130 MG/DL              <110 MG/DL  BORDERLINE-HIGH RISK:   130-
159 MG/DL           110-129 MG/DL  HIGH RISK:              >160 MG/DL           
   >130 MG/DL   *CHILDREN AND ADOLESCENTS REPRESENTS INDIVIDUALA AGED 2-19 YEARS
 EXCLUSIVE. 

 

          Creat     1.1 mg/dL 0.7-1.2                       [facility] ([facility]

[facility]  

 

                                        CHRONIC KIDNEY DISEASE STAGING PER NKF: 

  MALE GFR INTERPRETATION:  20-49 YRS:  

   >60 mL/min  Normal 50-59 YRS:     >56 mL/min  Normal 60-69 YRS:     >49 
mL/min  Normal 70-79 YRS:     >42 mL/min  Normal 80 and above  >35 mL/min  
Normal   FEMALE GRF INTERPRETATION:  20-39 YRS:    >60 mL/min  Normal 40-49 YRS:
    >58 mL/min  Normal 50-59 YRS:    >51 mL/min  Normal 60-69 YRS:    >45 mL/min
  Normal 70-79 YRS:    >39 mL/min  Normal 80 and above >32 mL/min  
NormalCLASSIFICATION            CHOLESTEROL FOR ADULTS       
CHILDREN/ADOLESCENTS*   DESIRABLE:                <200 MG/DL                   
<170 MG/DL  BORDER-LINE HIGH RISK:    200-239 MG/DL                170-199 MG/DL
  HIGH RISK:                >240 MG/DL                   >200 MG/DL    CLASS. 
FOR PRIMARY LDL CHOL PREVENTION:        LDL CHOL-CHILD/ADOLESCENTS*   DESIRABLE:
              <130 MG/DL              <110 MG/DL  BORDERLINE-HIGH RISK:   130-
159 MG/DL           110-129 MG/DL  HIGH RISK:              >160 MG/DL           
   >130 MG/DL   *CHILDREN AND ADOLESCENTS REPRESENTS INDIVIDUALA AGED 2-19 YEARS
 EXCLUSIVE. 

 

          K         4.2 mmol/L 3.5-5.1                       [facility] ([facility]  

 

                                        CHRONIC KIDNEY DISEASE STAGING PER NKF: 

  MALE GFR INTERPRETATION:  20-49 YRS:  

   >60 mL/min  Normal 50-59 YRS:     >56 mL/min  Normal 60-69 YRS:     >49 
mL/min  Normal 70-79 YRS:     >42 mL/min  Normal 80 and above  >35 mL/min  
Normal   FEMALE GRF INTERPRETATION:  20-39 YRS:    >60 mL/min  Normal 40-49 YRS:
    >58 mL/min  Normal 50-59 YRS:    >51 mL/min  Normal 60-69 YRS:    >45 mL/min
  Normal 70-79 YRS:    >39 mL/min  Normal 80 and above >32 mL/min  
NormalCLASSIFICATION            CHOLESTEROL FOR ADULTS       
CHILDREN/ADOLESCENTS*   DESIRABLE:                <200 MG/DL                   
<170 MG/DL  BORDER-LINE HIGH RISK:    200-239 MG/DL                170-199 MG/DL
  HIGH RISK:                >240 MG/DL                   >200 MG/DL    CLASS. 
FOR PRIMARY LDL CHOL PREVENTION:        LDL CHOL-CHILD/ADOLESCENTS*   DESIRABLE:
              <130 MG/DL              <110 MG/DL  BORDERLINE-HIGH RISK:   130-
159 MG/DL           110-129 MG/DL  HIGH RISK:              >160 MG/DL           
   >130 MG/DL   *CHILDREN AND ADOLESCENTS REPRESENTS INDIVIDUALA AGED 2-19 YEARS
 EXCLUSIVE. 

 

           Co2        19.6 mmol/L 22.0-29.0  Below low normal            [facility] 

([facility]                                   

 

                                        CHRONIC KIDNEY DISEASE STAGING PER NKF: 

  MALE GFR INTERPRETATION:  20-49 YRS:  

   >60 mL/min  Normal 50-59 YRS:     >56 mL/min  Normal 60-69 YRS:     >49 
mL/min  Normal 70-79 YRS:     >42 mL/min  Normal 80 and above  >35 mL/min  
Normal   FEMALE GRF INTERPRETATION:  20-39 YRS:    >60 mL/min  Normal 40-49 YRS:
    >58 mL/min  Normal 50-59 YRS:    >51 mL/min  Normal 60-69 YRS:    >45 mL/min
  Normal 70-79 YRS:    >39 mL/min  Normal 80 and above >32 mL/min  
NormalCLASSIFICATION            CHOLESTEROL FOR ADULTS       
CHILDREN/ADOLESCENTS*   DESIRABLE:                <200 MG/DL                   
<170 MG/DL  BORDER-LINE HIGH RISK:    200-239 MG/DL                170-199 MG/DL
  HIGH RISK:                >240 MG/DL                   >200 MG/DL    CLASS. 
FOR PRIMARY LDL CHOL PREVENTION:        LDL CHOL-CHILD/ADOLESCENTS*   DESIRABLE:
              <130 MG/DL              <110 MG/DL  BORDERLINE-HIGH RISK:   130-
159 MG/DL           110-129 MG/DL  HIGH RISK:              >160 MG/DL           
   >130 MG/DL   *CHILDREN AND ADOLESCENTS REPRESENTS INDIVIDUALA AGED 2-19 YEARS
 EXCLUSIVE. 

 

          CL        105.3 mmol/L 98.0-107.0                     [facility] ([facility]  

 

                                        CHRONIC KIDNEY DISEASE STAGING PER NKF: 

  MALE GFR INTERPRETATION:  20-49 YRS:  

   >60 mL/min  Normal 50-59 YRS:     >56 mL/min  Normal 60-69 YRS:     >49 
mL/min  Normal 70-79 YRS:     >42 mL/min  Normal 80 and above  >35 mL/min  
Normal   FEMALE GRF INTERPRETATION:  20-39 YRS:    >60 mL/min  Normal 40-49 YRS:
    >58 mL/min  Normal 50-59 YRS:    >51 mL/min  Normal 60-69 YRS:    >45 mL/min
  Normal 70-79 YRS:    >39 mL/min  Normal 80 and above >32 mL/min  
NormalCLASSIFICATION            CHOLESTEROL FOR ADULTS       
CHILDREN/ADOLESCENTS*   DESIRABLE:                <200 MG/DL                   
<170 MG/DL  BORDER-LINE HIGH RISK:    200-239 MG/DL                170-199 MG/DL
  HIGH RISK:                >240 MG/DL                   >200 MG/DL    CLASS. 
FOR PRIMARY LDL CHOL PREVENTION:        LDL CHOL-CHILD/ADOLESCENTS*   DESIRABLE:
              <130 MG/DL              <110 MG/DL  BORDERLINE-HIGH RISK:   130-
159 MG/DL           110-129 MG/DL  HIGH RISK:              >160 MG/DL           
   >130 MG/DL   *CHILDREN AND ADOLESCENTS REPRESENTS INDIVIDUALA AGED 2-19 YEARS
 EXCLUSIVE. 

 

           TP         6.5 g/dL   6.6-8.7    Below low normal            [facility] (

[facility]

                                         

 

                                        CHRONIC KIDNEY DISEASE STAGING PER NKF: 

  MALE GFR INTERPRETATION:  20-49 YRS:  

   >60 mL/min  Normal 50-59 YRS:     >56 mL/min  Normal 60-69 YRS:     >49 
mL/min  Normal 70-79 YRS:     >42 mL/min  Normal 80 and above  >35 mL/min  
Normal   FEMALE GRF INTERPRETATION:  20-39 YRS:    >60 mL/min  Normal 40-49 YRS:
    >58 mL/min  Normal 50-59 YRS:    >51 mL/min  Normal 60-69 YRS:    >45 mL/min
  Normal 70-79 YRS:    >39 mL/min  Normal 80 and above >32 mL/min  
NormalCLASSIFICATION            CHOLESTEROL FOR ADULTS       
CHILDREN/ADOLESCENTS*   DESIRABLE:                <200 MG/DL                   
<170 MG/DL  BORDER-LINE HIGH RISK:    200-239 MG/DL                170-199 MG/DL
  HIGH RISK:                >240 MG/DL                   >200 MG/DL    CLASS. 
FOR PRIMARY LDL CHOL PREVENTION:        LDL CHOL-CHILD/ADOLESCENTS*   DESIRABLE:
              <130 MG/DL              <110 MG/DL  BORDERLINE-HIGH RISK:   130-
159 MG/DL           110-129 MG/DL  HIGH RISK:              >160 MG/DL           
   >130 MG/DL   *CHILDREN AND ADOLESCENTS REPRESENTS INDIVIDUALA AGED 2-19 YEARS
 EXCLUSIVE. 

 

          CA        9.4 mg/dL 8.6-10.2                      [facility] ([facility]  

 

                                        CHRONIC KIDNEY DISEASE STAGING PER NKF: 

  MALE GFR INTERPRETATION:  20-49 YRS:  

   >60 mL/min  Normal 50-59 YRS:     >56 mL/min  Normal 60-69 YRS:     >49 
mL/min  Normal 70-79 YRS:     >42 mL/min  Normal 80 and above  >35 mL/min  
Normal   FEMALE GRF INTERPRETATION:  20-39 YRS:    >60 mL/min  Normal 40-49 YRS:
    >58 mL/min  Normal 50-59 YRS:    >51 mL/min  Normal 60-69 YRS:    >45 mL/min
  Normal 70-79 YRS:    >39 mL/min  Normal 80 and above >32 mL/min  
NormalCLASSIFICATION            CHOLESTEROL FOR ADULTS       
CHILDREN/ADOLESCENTS*   DESIRABLE:                <200 MG/DL                   
<170 MG/DL  BORDER-LINE HIGH RISK:    200-239 MG/DL                170-199 MG/DL
  HIGH RISK:                >240 MG/DL                   >200 MG/DL    CLASS. 
FOR PRIMARY LDL CHOL PREVENTION:        LDL CHOL-CHILD/ADOLESCENTS*   DESIRABLE:
              <130 MG/DL              <110 MG/DL  BORDERLINE-HIGH RISK:   130-
159 MG/DL           110-129 MG/DL  HIGH RISK:              >160 MG/DL           
   >130 MG/DL   *CHILDREN AND ADOLESCENTS REPRESENTS INDIVIDUALA AGED 2-19 YEARS
 EXCLUSIVE. 

 

          Alb       4.6 g/dL  3.5-5.2                       [facility] ([facility]  

 

                                        CHRONIC KIDNEY DISEASE STAGING PER NKF: 

  MALE GFR INTERPRETATION:  20-49 YRS:  

   >60 mL/min  Normal 50-59 YRS:     >56 mL/min  Normal 60-69 YRS:     >49 
mL/min  Normal 70-79 YRS:     >42 mL/min  Normal 80 and above  >35 mL/min  
Normal   FEMALE GRF INTERPRETATION:  20-39 YRS:    >60 mL/min  Normal 40-49 YRS:
    >58 mL/min  Normal 50-59 YRS:    >51 mL/min  Normal 60-69 YRS:    >45 mL/min
  Normal 70-79 YRS:    >39 mL/min  Normal 80 and above >32 mL/min  
NormalCLASSIFICATION            CHOLESTEROL FOR ADULTS       
CHILDREN/ADOLESCENTS*   DESIRABLE:                <200 MG/DL                   
<170 MG/DL  BORDER-LINE HIGH RISK:    200-239 MG/DL                170-199 MG/DL
  HIGH RISK:                >240 MG/DL                   >200 MG/DL    CLASS. 
FOR PRIMARY LDL CHOL PREVENTION:        LDL CHOL-CHILD/ADOLESCENTS*   DESIRABLE:
              <130 MG/DL              <110 MG/DL  BORDERLINE-HIGH RISK:   130-
159 MG/DL           110-129 MG/DL  HIGH RISK:              >160 MG/DL           
   >130 MG/DL   *CHILDREN AND ADOLESCENTS REPRESENTS INDIVIDUALA AGED 2-19 YEARS
 EXCLUSIVE. 

 

          Globulin  2.0 CALC                                [facility] ([facility]  

 

                                        CHRONIC KIDNEY DISEASE STAGING PER NKF: 

  MALE GFR INTERPRETATION:  20-49 YRS:  

   >60 mL/min  Normal 50-59 YRS:     >56 mL/min  Normal 60-69 YRS:     >49 
mL/min  Normal 70-79 YRS:     >42 mL/min  Normal 80 and above  >35 mL/min  
Normal   FEMALE GRF INTERPRETATION:  20-39 YRS:    >60 mL/min  Normal 40-49 YRS:
    >58 mL/min  Normal 50-59 YRS:    >51 mL/min  Normal 60-69 YRS:    >45 mL/min
  Normal 70-79 YRS:    >39 mL/min  Normal 80 and above >32 mL/min  
NormalCLASSIFICATION            CHOLESTEROL FOR ADULTS       
CHILDREN/ADOLESCENTS*   DESIRABLE:                <200 MG/DL                   
<170 MG/DL  BORDER-LINE HIGH RISK:    200-239 MG/DL                170-199 MG/DL
  HIGH RISK:                >240 MG/DL                   >200 MG/DL    CLASS. 
FOR PRIMARY LDL CHOL PREVENTION:        LDL CHOL-CHILD/ADOLESCENTS*   DESIRABLE:
              <130 MG/DL              <110 MG/DL  BORDERLINE-HIGH RISK:   130-
159 MG/DL           110-129 MG/DL  HIGH RISK:              >160 MG/DL           
   >130 MG/DL   *CHILDREN AND ADOLESCENTS REPRESENTS INDIVIDUALA AGED 2-19 YEARS
 EXCLUSIVE. 

 

          A/G Ratio 2.3 CALC                                [facility]  

 

                                        CHRONIC KIDNEY DISEASE STAGING PER NKF: 

  MALE GFR INTERPRETATION:  20-49 YRS:  

   >60 mL/min  Normal 50-59 YRS:     >56 mL/min  Normal 60-69 YRS:     >49 
mL/min  Normal 70-79 YRS:     >42 mL/min  Normal 80 and above  >35 mL/min  
Normal   FEMALE GRF INTERPRETATION:  20-39 YRS:    >60 mL/min  Normal 40-49 YRS:
    >58 mL/min  Normal 50-59 YRS:    >51 mL/min  Normal 60-69 YRS:    >45 mL/min
  Normal 70-79 YRS:    >39 mL/min  Normal 80 and above >32 mL/min  
NormalCLASSIFICATION            CHOLESTEROL FOR ADULTS       
CHILDREN/ADOLESCENTS*   DESIRABLE:                <200 MG/DL                   
<170 MG/DL  BORDER-LINE HIGH RISK:    200-239 MG/DL                170-199 MG/DL
  HIGH RISK:                >240 MG/DL                   >200 MG/DL    CLASS. 
FOR PRIMARY LDL CHOL PREVENTION:        LDL CHOL-CHILD/ADOLESCENTS*   DESIRABLE:
              <130 MG/DL              <110 MG/DL  BORDERLINE-HIGH RISK:   130-
159 MG/DL           110-129 MG/DL  HIGH RISK:              >160 MG/DL           
   >130 MG/DL   *CHILDREN AND ADOLESCENTS REPRESENTS INDIVIDUALA AGED 2-19 YEARS
 EXCLUSIVE. 

 

          Alt (SGPT) 9 U/L     0-41                          [facility] ([facility]  

 

                                        CHRONIC KIDNEY DISEASE STAGING PER NKF: 

  MALE GFR INTERPRETATION:  20-49 YRS:  

   >60 mL/min  Normal 50-59 YRS:     >56 mL/min  Normal 60-69 YRS:     >49 
mL/min  Normal 70-79 YRS:     >42 mL/min  Normal 80 and above  >35 mL/min  
Normal   FEMALE GRF INTERPRETATION:  20-39 YRS:    >60 mL/min  Normal 40-49 YRS:
    >58 mL/min  Normal 50-59 YRS:    >51 mL/min  Normal 60-69 YRS:    >45 mL/min
  Normal 70-79 YRS:    >39 mL/min  Normal 80 and above >32 mL/min  
NormalCLASSIFICATION            CHOLESTEROL FOR ADULTS       
CHILDREN/ADOLESCENTS*   DESIRABLE:                <200 MG/DL                   
<170 MG/DL  BORDER-LINE HIGH RISK:    200-239 MG/DL                170-199 MG/DL
  HIGH RISK:                >240 MG/DL                   >200 MG/DL    CLASS. 
FOR PRIMARY LDL CHOL PREVENTION:        LDL CHOL-CHILD/ADOLESCENTS*   DESIRABLE:
              <130 MG/DL              <110 MG/DL  BORDERLINE-HIGH RISK:   130-
159 MG/DL           110-129 MG/DL  HIGH RISK:              >160 MG/DL           
   >130 MG/DL   *CHILDREN AND ADOLESCENTS REPRESENTS INDIVIDUALA AGED 2-19 YEARS
 EXCLUSIVE. 

 

          Alp       87.7 U/L                          [facility] ([facility]  

 

                                        CHRONIC KIDNEY DISEASE STAGING PER NKF: 

  MALE GFR INTERPRETATION:  20-49 YRS:  

   >60 mL/min  Normal 50-59 YRS:     >56 mL/min  Normal 60-69 YRS:     >49 
mL/min  Normal 70-79 YRS:     >42 mL/min  Normal 80 and above  >35 mL/min  
Normal   FEMALE GRF INTERPRETATION:  20-39 YRS:    >60 mL/min  Normal 40-49 YRS:
    >58 mL/min  Normal 50-59 YRS:    >51 mL/min  Normal 60-69 YRS:    >45 mL/min
  Normal 70-79 YRS:    >39 mL/min  Normal 80 and above >32 mL/min  
NormalCLASSIFICATION            CHOLESTEROL FOR ADULTS       
CHILDREN/ADOLESCENTS*   DESIRABLE:                <200 MG/DL                   
<170 MG/DL  BORDER-LINE HIGH RISK:    200-239 MG/DL                170-199 MG/DL
  HIGH RISK:                >240 MG/DL                   >200 MG/DL    CLASS. 
FOR PRIMARY LDL CHOL PREVENTION:        LDL CHOL-CHILD/ADOLESCENTS*   DESIRABLE:
              <130 MG/DL              <110 MG/DL  BORDERLINE-HIGH RISK:   130-
159 MG/DL           110-129 MG/DL  HIGH RISK:              >160 MG/DL           
   >130 MG/DL   *CHILDREN AND ADOLESCENTS REPRESENTS INDIVIDUALA AGED 2-19 YEARS
 EXCLUSIVE. 

 

          Ast (Sgot) 12 U/L    0-40                          [facility] ([facility]  

 

                                        CHRONIC KIDNEY DISEASE STAGING PER NKF: 

  MALE GFR INTERPRETATION:  20-49 YRS:  

   >60 mL/min  Normal 50-59 YRS:     >56 mL/min  Normal 60-69 YRS:     >49 
mL/min  Normal 70-79 YRS:     >42 mL/min  Normal 80 and above  >35 mL/min  
Normal   FEMALE GRF INTERPRETATION:  20-39 YRS:    >60 mL/min  Normal 40-49 YRS:
    >58 mL/min  Normal 50-59 YRS:    >51 mL/min  Normal 60-69 YRS:    >45 mL/min
  Normal 70-79 YRS:    >39 mL/min  Normal 80 and above >32 mL/min  
NormalCLASSIFICATION            CHOLESTEROL FOR ADULTS       
CHILDREN/ADOLESCENTS*   DESIRABLE:                <200 MG/DL                   
<170 MG/DL  BORDER-LINE HIGH RISK:    200-239 MG/DL                170-199 MG/DL
  HIGH RISK:                >240 MG/DL                   >200 MG/DL    CLASS. 
FOR PRIMARY LDL CHOL PREVENTION:        LDL CHOL-CHILD/ADOLESCENTS*   DESIRABLE:
              <130 MG/DL              <110 MG/DL  BORDERLINE-HIGH RISK:   130-
159 MG/DL           110-129 MG/DL  HIGH RISK:              >160 MG/DL           
   >130 MG/DL   *CHILDREN AND ADOLESCENTS REPRESENTS INDIVIDUALA AGED 2-19 YEARS
 EXCLUSIVE. 

 

          Anion Gap 19 mmol/L                               [facility] ([facility]

[facility]  

 

                                        CHRONIC KIDNEY DISEASE STAGING PER NKF: 

  MALE GFR INTERPRETATION:  20-49 YRS:  

   >60 mL/min  Normal 50-59 YRS:     >56 mL/min  Normal 60-69 YRS:     >49 
mL/min  Normal 70-79 YRS:     >42 mL/min  Normal 80 and above  >35 mL/min  
Normal   FEMALE GRF INTERPRETATION:  20-39 YRS:    >60 mL/min  Normal 40-49 YRS:
    >58 mL/min  Normal 50-59 YRS:    >51 mL/min  Normal 60-69 YRS:    >45 mL/min
  Normal 70-79 YRS:    >39 mL/min  Normal 80 and above >32 mL/min  
NormalCLASSIFICATION            CHOLESTEROL FOR ADULTS       
CHILDREN/ADOLESCENTS*   DESIRABLE:                <200 MG/DL                   
<170 MG/DL  BORDER-LINE HIGH RISK:    200-239 MG/DL                170-199 MG/DL
  HIGH RISK:                >240 MG/DL                   >200 MG/DL    CLASS. 
FOR PRIMARY LDL CHOL PREVENTION:        LDL CHOL-CHILD/ADOLESCENTS*   DESIRABLE:
              <130 MG/DL              <110 MG/DL  BORDERLINE-HIGH RISK:   130-
159 MG/DL           110-129 MG/DL  HIGH RISK:              >160 MG/DL           
   >130 MG/DL   *CHILDREN AND ADOLESCENTS REPRESENTS INDIVIDUALA AGED 2-19 YEARS
 EXCLUSIVE. 

 

          Tbili     0.41 mg/dL 0.0-1.2                       [facility] ([facility]  

 

                                        CHRONIC KIDNEY DISEASE STAGING PER NKF: 

  MALE GFR INTERPRETATION:  20-49 YRS:  

   >60 mL/min  Normal 50-59 YRS:     >56 mL/min  Normal 60-69 YRS:     >49 
mL/min  Normal 70-79 YRS:     >42 mL/min  Normal 80 and above  >35 mL/min  
Normal   FEMALE GRF INTERPRETATION:  20-39 YRS:    >60 mL/min  Normal 40-49 YRS:
    >58 mL/min  Normal 50-59 YRS:    >51 mL/min  Normal 60-69 YRS:    >45 mL/min
  Normal 70-79 YRS:    >39 mL/min  Normal 80 and above >32 mL/min  
NormalCLASSIFICATION            CHOLESTEROL FOR ADULTS       
CHILDREN/ADOLESCENTS*   DESIRABLE:                <200 MG/DL                   
<170 MG/DL  BORDER-LINE HIGH RISK:    200-239 MG/DL                170-199 MG/DL
  HIGH RISK:                >240 MG/DL                   >200 MG/DL    CLASS. 
FOR PRIMARY LDL CHOL PREVENTION:        LDL CHOL-CHILD/ADOLESCENTS*   DESIRABLE:
              <130 MG/DL              <110 MG/DL  BORDERLINE-HIGH RISK:   130-
159 MG/DL           110-129 MG/DL  HIGH RISK:              >160 MG/DL           
   >130 MG/DL   *CHILDREN AND ADOLESCENTS REPRESENTS INDIVIDUALA AGED 2-19 YEARS
 EXCLUSIVE. 

 

           Osmolality-Calculated 282.3 CALC                                  [facility]

[facility] ([facility]

                                         

 

                                        CHRONIC KIDNEY DISEASE STAGING PER NKF: 

  MALE GFR INTERPRETATION:  20-49 YRS:  

   >60 mL/min  Normal 50-59 YRS:     >56 mL/min  Normal 60-69 YRS:     >49 
mL/min  Normal 70-79 YRS:     >42 mL/min  Normal 80 and above  >35 mL/min  
Normal   FEMALE GRF INTERPRETATION:  20-39 YRS:    >60 mL/min  Normal 40-49 YRS:
    >58 mL/min  Normal 50-59 YRS:    >51 mL/min  Normal 60-69 YRS:    >45 mL/min
  Normal 70-79 YRS:    >39 mL/min  Normal 80 and above >32 mL/min  
NormalCLASSIFICATION            CHOLESTEROL FOR ADULTS       
CHILDREN/ADOLESCENTS*   DESIRABLE:                <200 MG/DL                   
<170 MG/DL  BORDER-LINE HIGH RISK:    200-239 MG/DL                170-199 MG/DL
  HIGH RISK:                >240 MG/DL                   >200 MG/DL    CLASS. 
FOR PRIMARY LDL CHOL PREVENTION:        LDL CHOL-CHILD/ADOLESCENTS*   DESIRABLE:
              <130 MG/DL              <110 MG/DL  BORDERLINE-HIGH RISK:   130-
159 MG/DL           110-129 MG/DL  HIGH RISK:              >160 MG/DL           
   >130 MG/DL   *CHILDREN AND ADOLESCENTS REPRESENTS INDIVIDUALA AGED 2-19 YEARS
 EXCLUSIVE. 

 

          eGFR  78 #                                    [facility] (

[facility]  

 

                                        CHRONIC KIDNEY DISEASE STAGING PER NKF: 

  MALE GFR INTERPRETATION:  20-49 YRS:  

   >60 mL/min  Normal 50-59 YRS:     >56 mL/min  Normal 60-69 YRS:     >49 
mL/min  Normal 70-79 YRS:     >42 mL/min  Normal 80 and above  >35 mL/min  
Normal   FEMALE GRF INTERPRETATION:  20-39 YRS:    >60 mL/min  Normal 40-49 YRS:
    >58 mL/min  Normal 50-59 YRS:    >51 mL/min  Normal 60-69 YRS:    >45 mL/min
  Normal 70-79 YRS:    >39 mL/min  Normal 80 and above >32 mL/min  
NormalCLASSIFICATION            CHOLESTEROL FOR ADULTS       
CHILDREN/ADOLESCENTS*   DESIRABLE:                <200 MG/DL                   
<170 MG/DL  BORDER-LINE HIGH RISK:    200-239 MG/DL                170-199 MG/DL
  HIGH RISK:                >240 MG/DL                   >200 MG/DL    CLASS. 
FOR PRIMARY LDL CHOL PREVENTION:        LDL CHOL-CHILD/ADOLESCENTS*   DESIRABLE:
              <130 MG/DL              <110 MG/DL  BORDERLINE-HIGH RISK:   130-
159 MG/DL           110-129 MG/DL  HIGH RISK:              >160 MG/DL           
   >130 MG/DL   *CHILDREN AND ADOLESCENTS REPRESENTS INDIVIDUALA AGED 2-19 YEARS
 EXCLUSIVE. 

 

          eGFR Non-Afr. American 67 #                                    [facility] 

([facility]  

 

                                        CHRONIC KIDNEY DISEASE STAGING PER NKF: 

  MALE GFR INTERPRETATION:  20-49 YRS:  

   >60 mL/min  Normal 50-59 YRS:     >56 mL/min  Normal 60-69 YRS:     >49 
mL/min  Normal 70-79 YRS:     >42 mL/min  Normal 80 and above  >35 mL/min  
Normal   FEMALE GRF INTERPRETATION:  20-39 YRS:    >60 mL/min  Normal 40-49 YRS:
    >58 mL/min  Normal 50-59 YRS:    >51 mL/min  Normal 60-69 YRS:    >45 mL/min
  Normal 70-79 YRS:    >39 mL/min  Normal 80 and above >32 mL/min  
NormalCLASSIFICATION            CHOLESTEROL FOR ADULTS       
CHILDREN/ADOLESCENTS*   DESIRABLE:                <200 MG/DL                   
<170 MG/DL  BORDER-LINE HIGH RISK:    200-239 MG/DL                170-199 MG/DL
  HIGH RISK:                >240 MG/DL                   >200 MG/DL    CLASS. 
FOR PRIMARY LDL CHOL PREVENTION:        LDL CHOL-CHILD/ADOLESCENTS*   DESIRABLE:
              <130 MG/DL              <110 MG/DL  BORDERLINE-HIGH RISK:   130-
159 MG/DL           110-129 MG/DL  HIGH RISK:              >160 MG/DL           
   >130 MG/DL   *CHILDREN AND ADOLESCENTS REPRESENTS INDIVIDUALA AGED 2-19 YEARS
 EXCLUSIVE. 









                    ID                  Date                Data Source

 

                    [id number]         10/05/2021 09:35:00 AM EDT [facility] ([facility]

[facility]









          Name      Value     Range     Interpretation Code Description Data Khloe

rce(s) Supporting 

Document(s)

 

           Hemoglobin A1c/Hemoglobin.total in Blood 5.9 %      4.50-6.20        

                [facility] ([facility]               









                    ID                  Date                Data Source

 

                    [id number]         2021 01:34:00 PM EDT [facility] ([facility]

[facility]









          Name      Value     Range     Interpretation Code Description Data Khloe

rce(s) Supporting 

Document(s)

 

           Hemoglobin A1c/Hemoglobin.total in Blood 6.3 %      4.50-6.20  Above 

high normal            

[facility] ([facility]  









                    ID                  Date                Data Source

 

                    CT ABD & Pelvis w/o FOL by SURINDER 2021 12:00:00 AM EDT eC

W1 ([facility])









          Name      Value     Range     Interpretation Code Description Data Khloe

rce(s) Supporting 

Document(s)

 

                                                  CT ABD & Pelvis w/o FOL by SURINDER

 eCW1 ([facility])  









                    ID                  Date                Data Source

 

                    [id number]         2021 09:05:00 AM EDT [facility] ([facility]

[facility]









          Name      Value     Range     Interpretation Code Description Data Khloe

rce(s) Supporting 

Document(s)

 

           Hemoglobin A1c/Hemoglobin.total in Blood 6.3 %      4.50-6.20  Above 

high normal            

[facility] ([facility]  









                    ID                  Date                Data Source

 

                    [id number]         2021 09:00:00 AM EDT [facility] ([facility]

[facility]









          Name      Value     Range     Interpretation Code Description Data Khloe

rce(s) Supporting 

Document(s)

 

           Prostate specific Ag [Mass/volume] in Serum or Plasma 0.89 ng/mL 0.0-

4.0                          

[facility] ([facility]  









                    ID                  Date                Data Source

 

                    [id number]         2021 08:59:00 AM EDT [facility] ([facility]









          Name      Value     Range     Interpretation Code Description Data Khloe

rce(s) Supporting 

Document(s)

 

          Chol      180 mg/dL 0-200                         [facility] ([facility]  

 

                                        CHRONIC KIDNEY DISEASE STAGING PER NKF: 

  MALE GFR INTERPRETATION:  20-49 YRS:  

   >60 mL/min  Normal 50-59 YRS:     >56 mL/min  Normal 60-69 YRS:     >49 
mL/min  Normal 70-79 YRS:     >42 mL/min  Normal 80 and above  >35 mL/min  
Normal   FEMALE GRF INTERPRETATION:  20-39 YRS:    >60 mL/min  Normal 40-49 YRS:
    >58 mL/min  Normal 50-59 YRS:    >51 mL/min  Normal 60-69 YRS:    >45 mL/min
  Normal 70-79 YRS:    >39 mL/min  Normal 80 and above >32 mL/min  
NormalCLASSIFICATION            CHOLESTEROL FOR ADULTS       
CHILDREN/ADOLESCENTS*   DESIRABLE:                <200 MG/DL                   
<170 MG/DL  BORDER-LINE HIGH RISK:    200-239 MG/DL                170-199 MG/DL
  HIGH RISK:                >240 MG/DL                   >200 MG/DL    CLASS. 
FOR PRIMARY LDL CHOL PREVENTION:        LDL CHOL-CHILD/ADOLESCENTS*   DESIRABLE:
              <130 MG/DL              <110 MG/DL  BORDERLINE-HIGH RISK:   130-
159 MG/DL           110-129 MG/DL  HIGH RISK:              >160 MG/DL           
   >130 MG/DL   *CHILDREN AND ADOLESCENTS REPRESENTS INDIVIDUALA AGED 2-19 YEARS
 EXCLUSIVE. 

 

           Cholesterol in HDL [Mass/volume] in Serum or Plasma 42 mg/dL   35-55 

                           [facility] 

([facility]       

 

                                        CHRONIC KIDNEY DISEASE STAGING PER NKF: 

  MALE GFR INTERPRETATION:  20-49 YRS:  

   >60 mL/min  Normal 50-59 YRS:     >56 mL/min  Normal 60-69 YRS:     >49 
mL/min  Normal 70-79 YRS:     >42 mL/min  Normal 80 and above  >35 mL/min  
Normal   FEMALE GRF INTERPRETATION:  20-39 YRS:    >60 mL/min  Normal 40-49 YRS:
    >58 mL/min  Normal 50-59 YRS:    >51 mL/min  Normal 60-69 YRS:    >45 mL/min
  Normal 70-79 YRS:    >39 mL/min  Normal 80 and above >32 mL/min  
NormalCLASSIFICATION            CHOLESTEROL FOR ADULTS       
CHILDREN/ADOLESCENTS*   DESIRABLE:                <200 MG/DL                   
<170 MG/DL  BORDER-LINE HIGH RISK:    200-239 MG/DL                170-199 MG/DL
  HIGH RISK:                >240 MG/DL                   >200 MG/DL    CLASS. 
FOR PRIMARY LDL CHOL PREVENTION:        LDL CHOL-CHILD/ADOLESCENTS*   DESIRABLE:
              <130 MG/DL              <110 MG/DL  BORDERLINE-HIGH RISK:   130-
159 MG/DL           110-129 MG/DL  HIGH RISK:              >160 MG/DL           
   >130 MG/DL   *CHILDREN AND ADOLESCENTS REPRESENTS INDIVIDUALA AGED 2-19 YEARS
 EXCLUSIVE. 

 

          Trig      191 mg/dL                         [facility] ([facility]  

 

                                        CHRONIC KIDNEY DISEASE STAGING PER NKF: 

  MALE GFR INTERPRETATION:  20-49 YRS:  

   >60 mL/min  Normal 50-59 YRS:     >56 mL/min  Normal 60-69 YRS:     >49 
mL/min  Normal 70-79 YRS:     >42 mL/min  Normal 80 and above  >35 mL/min  
Normal   FEMALE GRF INTERPRETATION:  20-39 YRS:    >60 mL/min  Normal 40-49 YRS:
    >58 mL/min  Normal 50-59 YRS:    >51 mL/min  Normal 60-69 YRS:    >45 mL/min
  Normal 70-79 YRS:    >39 mL/min  Normal 80 and above >32 mL/min  
NormalCLASSIFICATION            CHOLESTEROL FOR ADULTS       
CHILDREN/ADOLESCENTS*   DESIRABLE:                <200 MG/DL                   
<170 MG/DL  BORDER-LINE HIGH RISK:    200-239 MG/DL                170-199 MG/DL
  HIGH RISK:                >240 MG/DL                   >200 MG/DL    CLASS. 
FOR PRIMARY LDL CHOL PREVENTION:        LDL CHOL-CHILD/ADOLESCENTS*   DESIRABLE:
              <130 MG/DL              <110 MG/DL  BORDERLINE-HIGH RISK:   130-
159 MG/DL           110-129 MG/DL  HIGH RISK:              >160 MG/DL           
   >130 MG/DL   *CHILDREN AND ADOLESCENTS REPRESENTS INDIVIDUALA AGED 2-19 YEARS
 EXCLUSIVE. 

 

          LDL_C     100 Calc                          [facility] ([facility]  

 

                                        CHRONIC KIDNEY DISEASE STAGING PER NKF: 

  MALE GFR INTERPRETATION:  20-49 YRS:  

   >60 mL/min  Normal 50-59 YRS:     >56 mL/min  Normal 60-69 YRS:     >49 
mL/min  Normal 70-79 YRS:     >42 mL/min  Normal 80 and above  >35 mL/min  
Normal   FEMALE GRF INTERPRETATION:  20-39 YRS:    >60 mL/min  Normal 40-49 YRS:
    >58 mL/min  Normal 50-59 YRS:    >51 mL/min  Normal 60-69 YRS:    >45 mL/min
  Normal 70-79 YRS:    >39 mL/min  Normal 80 and above >32 mL/min  
NormalCLASSIFICATION            CHOLESTEROL FOR ADULTS       
CHILDREN/ADOLESCENTS*   DESIRABLE:                <200 MG/DL                   
<170 MG/DL  BORDER-LINE HIGH RISK:    200-239 MG/DL                170-199 MG/DL
  HIGH RISK:                >240 MG/DL                   >200 MG/DL    CLASS. 
FOR PRIMARY LDL CHOL PREVENTION:        LDL CHOL-CHILD/ADOLESCENTS*   DESIRABLE:
              <130 MG/DL              <110 MG/DL  BORDERLINE-HIGH RISK:   130-
159 MG/DL           110-129 MG/DL  HIGH RISK:              >160 MG/DL           
   >130 MG/DL   *CHILDREN AND ADOLESCENTS REPRESENTS INDIVIDUALA AGED 2-19 YEARS
 EXCLUSIVE. 

 

          Cho/HDL Ratio 4.3 CALC                                [facility] ([facility]  

 

                                        CHRONIC KIDNEY DISEASE STAGING PER NKF: 

  MALE GFR INTERPRETATION:  20-49 YRS:  

   >60 mL/min  Normal 50-59 YRS:     >56 mL/min  Normal 60-69 YRS:     >49 
mL/min  Normal 70-79 YRS:     >42 mL/min  Normal 80 and above  >35 mL/min  
Normal   FEMALE GRF INTERPRETATION:  20-39 YRS:    >60 mL/min  Normal 40-49 YRS:
    >58 mL/min  Normal 50-59 YRS:    >51 mL/min  Normal 60-69 YRS:    >45 mL/min
  Normal 70-79 YRS:    >39 mL/min  Normal 80 and above >32 mL/min  
NormalCLASSIFICATION            CHOLESTEROL FOR ADULTS       
CHILDREN/ADOLESCENTS*   DESIRABLE:                <200 MG/DL                   
<170 MG/DL  BORDER-LINE HIGH RISK:    200-239 MG/DL                170-199 MG/DL
  HIGH RISK:                >240 MG/DL                   >200 MG/DL    CLASS. 
FOR PRIMARY LDL CHOL PREVENTION:        LDL CHOL-CHILD/ADOLESCENTS*   DESIRABLE:
              <130 MG/DL              <110 MG/DL  BORDERLINE-HIGH RISK:   130-
159 MG/DL           110-129 MG/DL  HIGH RISK:              >160 MG/DL           
   >130 MG/DL   *CHILDREN AND ADOLESCENTS REPRESENTS INDIVIDUALA AGED 2-19 YEARS
 EXCLUSIVE. 









                    ID                  Date                Data Source

 

                    [id number]         2021 08:59:00 AM EDT [facility] ([facility]









          Name      Value     Range     Interpretation Code Description Data Khloe

rce(s) Supporting 

Document(s)

 

           Glu        207 mg/dL       Above high normal            [facility] 

([facility]                                    

 

                                        CHRONIC KIDNEY DISEASE STAGING PER NKF: 

  MALE GFR INTERPRETATION:  20-49 YRS:  

   >60 mL/min  Normal 50-59 YRS:     >56 mL/min  Normal 60-69 YRS:     >49 
mL/min  Normal 70-79 YRS:     >42 mL/min  Normal 80 and above  >35 mL/min  
Normal   FEMALE GRF INTERPRETATION:  20-39 YRS:    >60 mL/min  Normal 40-49 YRS:
    >58 mL/min  Normal 50-59 YRS:    >51 mL/min  Normal 60-69 YRS:    >45 mL/min
  Normal 70-79 YRS:    >39 mL/min  Normal 80 and above >32 mL/min  
NormalCLASSIFICATION            CHOLESTEROL FOR ADULTS       
CHILDREN/ADOLESCENTS*   DESIRABLE:                <200 MG/DL                   
<170 MG/DL  BORDER-LINE HIGH RISK:    200-239 MG/DL                170-199 MG/DL
  HIGH RISK:                >240 MG/DL                   >200 MG/DL    CLASS. 
FOR PRIMARY LDL CHOL PREVENTION:        LDL CHOL-CHILD/ADOLESCENTS*   DESIRABLE:
              <130 MG/DL              <110 MG/DL  BORDERLINE-HIGH RISK:   130-
159 MG/DL           110-129 MG/DL  HIGH RISK:              >160 MG/DL           
   >130 MG/DL   *CHILDREN AND ADOLESCENTS REPRESENTS INDIVIDUALA AGED 2-19 YEARS
 EXCLUSIVE. 

 

          BUN       20 mg/dL  8-23                          [facility] ([facility]  

 

                                        CHRONIC KIDNEY DISEASE STAGING PER NKF: 

  MALE GFR INTERPRETATION:  20-49 YRS:  

   >60 mL/min  Normal 50-59 YRS:     >56 mL/min  Normal 60-69 YRS:     >49 
mL/min  Normal 70-79 YRS:     >42 mL/min  Normal 80 and above  >35 mL/min  
Normal   FEMALE GRF INTERPRETATION:  20-39 YRS:    >60 mL/min  Normal 40-49 YRS:
    >58 mL/min  Normal 50-59 YRS:    >51 mL/min  Normal 60-69 YRS:    >45 mL/min
  Normal 70-79 YRS:    >39 mL/min  Normal 80 and above >32 mL/min  
NormalCLASSIFICATION            CHOLESTEROL FOR ADULTS       
CHILDREN/ADOLESCENTS*   DESIRABLE:                <200 MG/DL                   
<170 MG/DL  BORDER-LINE HIGH RISK:    200-239 MG/DL                170-199 MG/DL
  HIGH RISK:                >240 MG/DL                   >200 MG/DL    CLASS. 
FOR PRIMARY LDL CHOL PREVENTION:        LDL CHOL-CHILD/ADOLESCENTS*   DESIRABLE:
              <130 MG/DL              <110 MG/DL  BORDERLINE-HIGH RISK:   130-
159 MG/DL           110-129 MG/DL  HIGH RISK:              >160 MG/DL           
   >130 MG/DL   *CHILDREN AND ADOLESCENTS REPRESENTS INDIVIDUALA AGED 2-19 YEARS
 EXCLUSIVE. 

 

          BUN/Creatinine Ratio 18.2 CALC                               [facility] ([facility]  

 

                                        CHRONIC KIDNEY DISEASE STAGING PER NKF: 

  MALE GFR INTERPRETATION:  20-49 YRS:  

   >60 mL/min  Normal 50-59 YRS:     >56 mL/min  Normal 60-69 YRS:     >49 
mL/min  Normal 70-79 YRS:     >42 mL/min  Normal 80 and above  >35 mL/min  
Normal   FEMALE GRF INTERPRETATION:  20-39 YRS:    >60 mL/min  Normal 40-49 YRS:
    >58 mL/min  Normal 50-59 YRS:    >51 mL/min  Normal 60-69 YRS:    >45 mL/min
  Normal 70-79 YRS:    >39 mL/min  Normal 80 and above >32 mL/min  
NormalCLASSIFICATION            CHOLESTEROL FOR ADULTS       
CHILDREN/ADOLESCENTS*   DESIRABLE:                <200 MG/DL                   
<170 MG/DL  BORDER-LINE HIGH RISK:    200-239 MG/DL                170-199 MG/DL
  HIGH RISK:                >240 MG/DL                   >200 MG/DL    CLASS. 
FOR PRIMARY LDL CHOL PREVENTION:        LDL CHOL-CHILD/ADOLESCENTS*   DESIRABLE:
              <130 MG/DL              <110 MG/DL  BORDERLINE-HIGH RISK:   130-
159 MG/DL           110-129 MG/DL  HIGH RISK:              >160 MG/DL           
   >130 MG/DL   *CHILDREN AND ADOLESCENTS REPRESENTS INDIVIDUALA AGED 2-19 YEARS
 EXCLUSIVE. 

 

          Creat     1.1 mg/dL 0.7-1.2                       [facility] ([facility]  

 

                                        CHRONIC KIDNEY DISEASE STAGING PER NKF: 

  MALE GFR INTERPRETATION:  20-49 YRS:  

   >60 mL/min  Normal 50-59 YRS:     >56 mL/min  Normal 60-69 YRS:     >49 
mL/min  Normal 70-79 YRS:     >42 mL/min  Normal 80 and above  >35 mL/min  
Normal   FEMALE GRF INTERPRETATION:  20-39 YRS:    >60 mL/min  Normal 40-49 YRS:
    >58 mL/min  Normal 50-59 YRS:    >51 mL/min  Normal 60-69 YRS:    >45 mL/min
  Normal 70-79 YRS:    >39 mL/min  Normal 80 and above >32 mL/min  
NormalCLASSIFICATION            CHOLESTEROL FOR ADULTS       
CHILDREN/ADOLESCENTS*   DESIRABLE:                <200 MG/DL                   
<170 MG/DL  BORDER-LINE HIGH RISK:    200-239 MG/DL                170-199 MG/DL
  HIGH RISK:                >240 MG/DL                   >200 MG/DL    CLASS. 
FOR PRIMARY LDL CHOL PREVENTION:        LDL CHOL-CHILD/ADOLESCENTS*   DESIRABLE:
              <130 MG/DL              <110 MG/DL  BORDERLINE-HIGH RISK:   130-
159 MG/DL           110-129 MG/DL  HIGH RISK:              >160 MG/DL           
   >130 MG/DL   *CHILDREN AND ADOLESCENTS REPRESENTS INDIVIDUALA AGED 2-19 YEARS
 EXCLUSIVE. 

 

           Na         134 mmol/L 136-145    Below low normal            [facility] (

[facility]                                    

 

                                        CHRONIC KIDNEY DISEASE STAGING PER NKF: 

  MALE GFR INTERPRETATION:  20-49 YRS:  

   >60 mL/min  Normal 50-59 YRS:     >56 mL/min  Normal 60-69 YRS:     >49 
mL/min  Normal 70-79 YRS:     >42 mL/min  Normal 80 and above  >35 mL/min  
Normal   FEMALE GRF INTERPRETATION:  20-39 YRS:    >60 mL/min  Normal 40-49 YRS:
    >58 mL/min  Normal 50-59 YRS:    >51 mL/min  Normal 60-69 YRS:    >45 mL/min
  Normal 70-79 YRS:    >39 mL/min  Normal 80 and above >32 mL/min  
NormalCLASSIFICATION            CHOLESTEROL FOR ADULTS       
CHILDREN/ADOLESCENTS*   DESIRABLE:                <200 MG/DL                   
<170 MG/DL  BORDER-LINE HIGH RISK:    200-239 MG/DL                170-199 MG/DL
  HIGH RISK:                >240 MG/DL                   >200 MG/DL    CLASS. 
FOR PRIMARY LDL CHOL PREVENTION:        LDL CHOL-CHILD/ADOLESCENTS*   DESIRABLE:
              <130 MG/DL              <110 MG/DL  BORDERLINE-HIGH RISK:   130-
159 MG/DL           110-129 MG/DL  HIGH RISK:              >160 MG/DL           
   >130 MG/DL   *CHILDREN AND ADOLESCENTS REPRESENTS INDIVIDUALA AGED 2-19 YEARS
 EXCLUSIVE. 

 

          K         4.1 mmol/L 3.5-5.1                       [facility] ([facility]  

 

                                        CHRONIC KIDNEY DISEASE STAGING PER NKF: 

  MALE GFR INTERPRETATION:  20-49 YRS:  

   >60 mL/min  Normal 50-59 YRS:     >56 mL/min  Normal 60-69 YRS:     >49 
mL/min  Normal 70-79 YRS:     >42 mL/min  Normal 80 and above  >35 mL/min  
Normal   FEMALE GRF INTERPRETATION:  20-39 YRS:    >60 mL/min  Normal 40-49 YRS:
    >58 mL/min  Normal 50-59 YRS:    >51 mL/min  Normal 60-69 YRS:    >45 mL/min
  Normal 70-79 YRS:    >39 mL/min  Normal 80 and above >32 mL/min  
NormalCLASSIFICATION            CHOLESTEROL FOR ADULTS       
CHILDREN/ADOLESCENTS*   DESIRABLE:                <200 MG/DL                   
<170 MG/DL  BORDER-LINE HIGH RISK:    200-239 MG/DL                170-199 MG/DL
  HIGH RISK:                >240 MG/DL                   >200 MG/DL    CLASS. 
FOR PRIMARY LDL CHOL PREVENTION:        LDL CHOL-CHILD/ADOLESCENTS*   DESIRABLE:
              <130 MG/DL              <110 MG/DL  BORDERLINE-HIGH RISK:   130-
159 MG/DL           110-129 MG/DL  HIGH RISK:              >160 MG/DL           
   >130 MG/DL   *CHILDREN AND ADOLESCENTS REPRESENTS INDIVIDUALA AGED 2-19 YEARS
 EXCLUSIVE. 

 

          CL        99.9 mmol/L 98.0-107.0                     [facility] ([facility]  

 

                                        CHRONIC KIDNEY DISEASE STAGING PER NKF: 

  MALE GFR INTERPRETATION:  20-49 YRS:  

   >60 mL/min  Normal 50-59 YRS:     >56 mL/min  Normal 60-69 YRS:     >49 
mL/min  Normal 70-79 YRS:     >42 mL/min  Normal 80 and above  >35 mL/min  
Normal   FEMALE GRF INTERPRETATION:  20-39 YRS:    >60 mL/min  Normal 40-49 YRS:
    >58 mL/min  Normal 50-59 YRS:    >51 mL/min  Normal 60-69 YRS:    >45 mL/min
  Normal 70-79 YRS:    >39 mL/min  Normal 80 and above >32 mL/min  
NormalCLASSIFICATION            CHOLESTEROL FOR ADULTS       
CHILDREN/ADOLESCENTS*   DESIRABLE:                <200 MG/DL                   
<170 MG/DL  BORDER-LINE HIGH RISK:    200-239 MG/DL                170-199 MG/DL
  HIGH RISK:                >240 MG/DL                   >200 MG/DL    CLASS. 
FOR PRIMARY LDL CHOL PREVENTION:        LDL CHOL-CHILD/ADOLESCENTS*   DESIRABLE:
              <130 MG/DL              <110 MG/DL  BORDERLINE-HIGH RISK:   130-
159 MG/DL           110-129 MG/DL  HIGH RISK:              >160 MG/DL           
   >130 MG/DL   *CHILDREN AND ADOLESCENTS REPRESENTS INDIVIDUALA AGED 2-19 YEARS
 EXCLUSIVE. 

 

           Co2        21.9 mmol/L 22.0-29.0  Below low normal            [facility] 

([facility]                                   

 

                                        CHRONIC KIDNEY DISEASE STAGING PER NKF: 

  MALE GFR INTERPRETATION:  20-49 YRS:  

   >60 mL/min  Normal 50-59 YRS:     >56 mL/min  Normal 60-69 YRS:     >49 
mL/min  Normal 70-79 YRS:     >42 mL/min  Normal 80 and above  >35 mL/min  
Normal   FEMALE GRF INTERPRETATION:  20-39 YRS:    >60 mL/min  Normal 40-49 YRS:
    >58 mL/min  Normal 50-59 YRS:    >51 mL/min  Normal 60-69 YRS:    >45 mL/min
  Normal 70-79 YRS:    >39 mL/min  Normal 80 and above >32 mL/min  
NormalCLASSIFICATION            CHOLESTEROL FOR ADULTS       
CHILDREN/ADOLESCENTS*   DESIRABLE:                <200 MG/DL                   
<170 MG/DL  BORDER-LINE HIGH RISK:    200-239 MG/DL                170-199 MG/DL
  HIGH RISK:                >240 MG/DL                   >200 MG/DL    CLASS. 
FOR PRIMARY LDL CHOL PREVENTION:        LDL CHOL-CHILD/ADOLESCENTS*   DESIRABLE:
              <130 MG/DL              <110 MG/DL  BORDERLINE-HIGH RISK:   130-
159 MG/DL           110-129 MG/DL  HIGH RISK:              >160 MG/DL           
   >130 MG/DL   *CHILDREN AND ADOLESCENTS REPRESENTS INDIVIDUALA AGED 2-19 YEARS
 EXCLUSIVE. 

 

          CA        8.8 mg/dL 8.6-10.2                      [facility] ([facility]  

 

                                        CHRONIC KIDNEY DISEASE STAGING PER NKF: 

  MALE GFR INTERPRETATION:  20-49 YRS:  

   >60 mL/min  Normal 50-59 YRS:     >56 mL/min  Normal 60-69 YRS:     >49 
mL/min  Normal 70-79 YRS:     >42 mL/min  Normal 80 and above  >35 mL/min  
Normal   FEMALE GRF INTERPRETATION:  20-39 YRS:    >60 mL/min  Normal 40-49 YRS:
    >58 mL/min  Normal 50-59 YRS:    >51 mL/min  Normal 60-69 YRS:    >45 mL/min
  Normal 70-79 YRS:    >39 mL/min  Normal 80 and above >32 mL/min  
NormalCLASSIFICATION            CHOLESTEROL FOR ADULTS       
CHILDREN/ADOLESCENTS*   DESIRABLE:                <200 MG/DL                   
<170 MG/DL  BORDER-LINE HIGH RISK:    200-239 MG/DL                170-199 MG/DL
  HIGH RISK:                >240 MG/DL                   >200 MG/DL    CLASS. 
FOR PRIMARY LDL CHOL PREVENTION:        LDL CHOL-CHILD/ADOLESCENTS*   DESIRABLE:
              <130 MG/DL              <110 MG/DL  BORDERLINE-HIGH RISK:   130-
159 MG/DL           110-129 MG/DL  HIGH RISK:              >160 MG/DL           
   >130 MG/DL   *CHILDREN AND ADOLESCENTS REPRESENTS INDIVIDUALA AGED 2-19 YEARS
 EXCLUSIVE. 

 

           TP         6.2 g/dL   6.6-8.7    Below low normal            [facility] (

[facility]

                                         

 

                                        CHRONIC KIDNEY DISEASE STAGING PER NKF: 

  MALE GFR INTERPRETATION:  20-49 YRS:  

   >60 mL/min  Normal 50-59 YRS:     >56 mL/min  Normal 60-69 YRS:     >49 
mL/min  Normal 70-79 YRS:     >42 mL/min  Normal 80 and above  >35 mL/min  
Normal   FEMALE GRF INTERPRETATION:  20-39 YRS:    >60 mL/min  Normal 40-49 YRS:
    >58 mL/min  Normal 50-59 YRS:    >51 mL/min  Normal 60-69 YRS:    >45 mL/min
  Normal 70-79 YRS:    >39 mL/min  Normal 80 and above >32 mL/min  
NormalCLASSIFICATION            CHOLESTEROL FOR ADULTS       
CHILDREN/ADOLESCENTS*   DESIRABLE:                <200 MG/DL                   
<170 MG/DL  BORDER-LINE HIGH RISK:    200-239 MG/DL                170-199 MG/DL
  HIGH RISK:                >240 MG/DL                   >200 MG/DL    CLASS. 
FOR PRIMARY LDL CHOL PREVENTION:        LDL CHOL-CHILD/ADOLESCENTS*   DESIRABLE:
              <130 MG/DL              <110 MG/DL  BORDERLINE-HIGH RISK:   130-
159 MG/DL           110-129 MG/DL  HIGH RISK:              >160 MG/DL           
   >130 MG/DL   *CHILDREN AND ADOLESCENTS REPRESENTS INDIVIDUALA AGED 2-19 YEARS
 EXCLUSIVE. 

 

          Alb       4.2 g/dL  3.5-5.2                       [facility] ([facility]  

 

                                        CHRONIC KIDNEY DISEASE STAGING PER NKF: 

  MALE GFR INTERPRETATION:  20-49 YRS:  

   >60 mL/min  Normal 50-59 YRS:     >56 mL/min  Normal 60-69 YRS:     >49 
mL/min  Normal 70-79 YRS:     >42 mL/min  Normal 80 and above  >35 mL/min  
Normal   FEMALE GRF INTERPRETATION:  20-39 YRS:    >60 mL/min  Normal 40-49 YRS:
    >58 mL/min  Normal 50-59 YRS:    >51 mL/min  Normal 60-69 YRS:    >45 mL/min
  Normal 70-79 YRS:    >39 mL/min  Normal 80 and above >32 mL/min  
NormalCLASSIFICATION            CHOLESTEROL FOR ADULTS       
CHILDREN/ADOLESCENTS*   DESIRABLE:                <200 MG/DL                   
<170 MG/DL  BORDER-LINE HIGH RISK:    200-239 MG/DL                170-199 MG/DL
  HIGH RISK:                >240 MG/DL                   >200 MG/DL    CLASS. 
FOR PRIMARY LDL CHOL PREVENTION:        LDL CHOL-CHILD/ADOLESCENTS*   DESIRABLE:
              <130 MG/DL              <110 MG/DL  BORDERLINE-HIGH RISK:   130-
159 MG/DL           110-129 MG/DL  HIGH RISK:              >160 MG/DL           
   >130 MG/DL   *CHILDREN AND ADOLESCENTS REPRESENTS INDIVIDUALA AGED 2-19 YEARS
 EXCLUSIVE. 

 

          A/G Ratio 2.1 CALC                                [facility] ([facility]  

 

                                        CHRONIC KIDNEY DISEASE STAGING PER NKF: 

  MALE GFR INTERPRETATION:  20-49 YRS:  

   >60 mL/min  Normal 50-59 YRS:     >56 mL/min  Normal 60-69 YRS:     >49 
mL/min  Normal 70-79 YRS:     >42 mL/min  Normal 80 and above  >35 mL/min  
Normal   FEMALE GRF INTERPRETATION:  20-39 YRS:    >60 mL/min  Normal 40-49 YRS:
    >58 mL/min  Normal 50-59 YRS:    >51 mL/min  Normal 60-69 YRS:    >45 mL/min
  Normal 70-79 YRS:    >39 mL/min  Normal 80 and above >32 mL/min  
NormalCLASSIFICATION            CHOLESTEROL FOR ADULTS       
CHILDREN/ADOLESCENTS*   DESIRABLE:                <200 MG/DL                   
<170 MG/DL  BORDER-LINE HIGH RISK:    200-239 MG/DL                170-199 MG/DL
  HIGH RISK:                >240 MG/DL                   >200 MG/DL    CLASS. 
FOR PRIMARY LDL CHOL PREVENTION:        LDL CHOL-CHILD/ADOLESCENTS*   DESIRABLE:
              <130 MG/DL              <110 MG/DL  BORDERLINE-HIGH RISK:   130-
159 MG/DL           110-129 MG/DL  HIGH RISK:              >160 MG/DL           
   >130 MG/DL   *CHILDREN AND ADOLESCENTS REPRESENTS INDIVIDUALA AGED 2-19 YEARS
 EXCLUSIVE. 

 

          Globulin  2.0 CALC                                [facility] ([facility]  

 

                                        CHRONIC KIDNEY DISEASE STAGING PER NKF: 

  MALE GFR INTERPRETATION:  20-49 YRS:  

   >60 mL/min  Normal 50-59 YRS:     >56 mL/min  Normal 60-69 YRS:     >49 
mL/min  Normal 70-79 YRS:     >42 mL/min  Normal 80 and above  >35 mL/min  
Normal   FEMALE GRF INTERPRETATION:  20-39 YRS:    >60 mL/min  Normal 40-49 YRS:
    >58 mL/min  Normal 50-59 YRS:    >51 mL/min  Normal 60-69 YRS:    >45 mL/min
  Normal 70-79 YRS:    >39 mL/min  Normal 80 and above >32 mL/min  
NormalCLASSIFICATION            CHOLESTEROL FOR ADULTS       
CHILDREN/ADOLESCENTS*   DESIRABLE:                <200 MG/DL                   
<170 MG/DL  BORDER-LINE HIGH RISK:    200-239 MG/DL                170-199 MG/DL
  HIGH RISK:                >240 MG/DL                   >200 MG/DL    CLASS. 
FOR PRIMARY LDL CHOL PREVENTION:        LDL CHOL-CHILD/ADOLESCENTS*   DESIRABLE:
              <130 MG/DL              <110 MG/DL  BORDERLINE-HIGH RISK:   130-
159 MG/DL           110-129 MG/DL  HIGH RISK:              >160 MG/DL           
   >130 MG/DL   *CHILDREN AND ADOLESCENTS REPRESENTS INDIVIDUALA AGED 2-19 YEARS
 EXCLUSIVE. 

 

          Ast (Sgot) 12 U/L    0-40                          [facility] ([facility]  

 

                                        CHRONIC KIDNEY DISEASE STAGING PER NKF: 

  MALE GFR INTERPRETATION:  20-49 YRS:  

   >60 mL/min  Normal 50-59 YRS:     >56 mL/min  Normal 60-69 YRS:     >49 
mL/min  Normal 70-79 YRS:     >42 mL/min  Normal 80 and above  >35 mL/min  
Normal   FEMALE GRF INTERPRETATION:  20-39 YRS:    >60 mL/min  Normal 40-49 YRS:
    >58 mL/min  Normal 50-59 YRS:    >51 mL/min  Normal 60-69 YRS:    >45 mL/min
  Normal 70-79 YRS:    >39 mL/min  Normal 80 and above >32 mL/min  
NormalCLASSIFICATION            CHOLESTEROL FOR ADULTS       
CHILDREN/ADOLESCENTS*   DESIRABLE:                <200 MG/DL                   
<170 MG/DL  BORDER-LINE HIGH RISK:    200-239 MG/DL                170-199 MG/DL
  HIGH RISK:                >240 MG/DL                   >200 MG/DL    CLASS. 
FOR PRIMARY LDL CHOL PREVENTION:        LDL CHOL-CHILD/ADOLESCENTS*   DESIRABLE:
              <130 MG/DL              <110 MG/DL  BORDERLINE-HIGH RISK:   130-
159 MG/DL           110-129 MG/DL  HIGH RISK:              >160 MG/DL           
   >130 MG/DL   *CHILDREN AND ADOLESCENTS REPRESENTS INDIVIDUALA AGED 2-19 YEARS
 EXCLUSIVE. 

 

          Alt (SGPT) 11 U/L    0-41                          [facility] ([facility]  

 

                                        CHRONIC KIDNEY DISEASE STAGING PER NKF: 

  MALE GFR INTERPRETATION:  20-49 YRS:  

   >60 mL/min  Normal 50-59 YRS:     >56 mL/min  Normal 60-69 YRS:     >49 
mL/min  Normal 70-79 YRS:     >42 mL/min  Normal 80 and above  >35 mL/min  
Normal   FEMALE GRF INTERPRETATION:  20-39 YRS:    >60 mL/min  Normal 40-49 YRS:
    >58 mL/min  Normal 50-59 YRS:    >51 mL/min  Normal 60-69 YRS:    >45 mL/min
  Normal 70-79 YRS:    >39 mL/min  Normal 80 and above >32 mL/min  
NormalCLASSIFICATION            CHOLESTEROL FOR ADULTS       
CHILDREN/ADOLESCENTS*   DESIRABLE:                <200 MG/DL                   
<170 MG/DL  BORDER-LINE HIGH RISK:    200-239 MG/DL                170-199 MG/DL
  HIGH RISK:                >240 MG/DL                   >200 MG/DL    CLASS. 
FOR PRIMARY LDL CHOL PREVENTION:        LDL CHOL-CHILD/ADOLESCENTS*   DESIRABLE:
              <130 MG/DL              <110 MG/DL  BORDERLINE-HIGH RISK:   130-
159 MG/DL           110-129 MG/DL  HIGH RISK:              >160 MG/DL           
   >130 MG/DL   *CHILDREN AND ADOLESCENTS REPRESENTS INDIVIDUALA AGED 2-19 YEARS
 EXCLUSIVE. 

 

          Alp       79.4 U/L                          [facility]  

 

                                        CHRONIC KIDNEY DISEASE STAGING PER NKF: 

  MALE GFR INTERPRETATION:  20-49 YRS:  

   >60 mL/min  Normal 50-59 YRS:     >56 mL/min  Normal 60-69 YRS:     >49 
mL/min  Normal 70-79 YRS:     >42 mL/min  Normal 80 and above  >35 mL/min  
Normal   FEMALE GRF INTERPRETATION:  20-39 YRS:    >60 mL/min  Normal 40-49 YRS:
    >58 mL/min  Normal 50-59 YRS:    >51 mL/min  Normal 60-69 YRS:    >45 mL/min
  Normal 70-79 YRS:    >39 mL/min  Normal 80 and above >32 mL/min  
NormalCLASSIFICATION            CHOLESTEROL FOR ADULTS       
CHILDREN/ADOLESCENTS*   DESIRABLE:                <200 MG/DL                   
<170 MG/DL  BORDER-LINE HIGH RISK:    200-239 MG/DL                170-199 MG/DL
  HIGH RISK:                >240 MG/DL                   >200 MG/DL    CLASS. 
FOR PRIMARY LDL CHOL PREVENTION:        LDL CHOL-CHILD/ADOLESCENTS*   DESIRABLE:
              <130 MG/DL              <110 MG/DL  BORDERLINE-HIGH RISK:   130-
159 MG/DL           110-129 MG/DL  HIGH RISK:              >160 MG/DL           
   >130 MG/DL   *CHILDREN AND ADOLESCENTS REPRESENTS INDIVIDUALA AGED 2-19 YEARS
 EXCLUSIVE. 

 

           Osmolality-Calculated 276.4 CALC                                  [facility]

[facility] ([facility]

                                         

 

                                        CHRONIC KIDNEY DISEASE STAGING PER NKF: 

  MALE GFR INTERPRETATION:  20-49 YRS:  

   >60 mL/min  Normal 50-59 YRS:     >56 mL/min  Normal 60-69 YRS:     >49 
mL/min  Normal 70-79 YRS:     >42 mL/min  Normal 80 and above  >35 mL/min  
Normal   FEMALE GRF INTERPRETATION:  20-39 YRS:    >60 mL/min  Normal 40-49 YRS:
    >58 mL/min  Normal 50-59 YRS:    >51 mL/min  Normal 60-69 YRS:    >45 mL/min
  Normal 70-79 YRS:    >39 mL/min  Normal 80 and above >32 mL/min  
NormalCLASSIFICATION            CHOLESTEROL FOR ADULTS       
CHILDREN/ADOLESCENTS*   DESIRABLE:                <200 MG/DL                   
<170 MG/DL  BORDER-LINE HIGH RISK:    200-239 MG/DL                170-199 MG/DL
  HIGH RISK:                >240 MG/DL                   >200 MG/DL    CLASS. 
FOR PRIMARY LDL CHOL PREVENTION:        LDL CHOL-CHILD/ADOLESCENTS*   DESIRABLE:
              <130 MG/DL              <110 MG/DL  BORDERLINE-HIGH RISK:   130-
159 MG/DL           110-129 MG/DL  HIGH RISK:              >160 MG/DL           
   >130 MG/DL   *CHILDREN AND ADOLESCENTS REPRESENTS INDIVIDUALA AGED 2-19 YEARS
 EXCLUSIVE. 

 

          Tbili     0.52 mg/dL 0.0-1.2                       [facility] ([facility]  

 

                                        CHRONIC KIDNEY DISEASE STAGING PER NKF: 

  MALE GFR INTERPRETATION:  20-49 YRS:  

   >60 mL/min  Normal 50-59 YRS:     >56 mL/min  Normal 60-69 YRS:     >49 
mL/min  Normal 70-79 YRS:     >42 mL/min  Normal 80 and above  >35 mL/min  
Normal   FEMALE GRF INTERPRETATION:  20-39 YRS:    >60 mL/min  Normal 40-49 YRS:
    >58 mL/min  Normal 50-59 YRS:    >51 mL/min  Normal 60-69 YRS:    >45 mL/min
  Normal 70-79 YRS:    >39 mL/min  Normal 80 and above >32 mL/min  
NormalCLASSIFICATION            CHOLESTEROL FOR ADULTS       
CHILDREN/ADOLESCENTS*   DESIRABLE:                <200 MG/DL                   
<170 MG/DL  BORDER-LINE HIGH RISK:    200-239 MG/DL                170-199 MG/DL
  HIGH RISK:                >240 MG/DL                   >200 MG/DL    CLASS. 
FOR PRIMARY LDL CHOL PREVENTION:        LDL CHOL-CHILD/ADOLESCENTS*   DESIRABLE:
              <130 MG/DL              <110 MG/DL  BORDERLINE-HIGH RISK:   130-
159 MG/DL           110-129 MG/DL  HIGH RISK:              >160 MG/DL           
   >130 MG/DL   *CHILDREN AND ADOLESCENTS REPRESENTS INDIVIDUALA AGED 2-19 YEARS
 EXCLUSIVE. 

 

          eGFR  78 #                                    [facility] (

[facility]  

 

                                        CHRONIC KIDNEY DISEASE STAGING PER NKF: 

  MALE GFR INTERPRETATION:  20-49 YRS:  

   >60 mL/min  Normal 50-59 YRS:     >56 mL/min  Normal 60-69 YRS:     >49 
mL/min  Normal 70-79 YRS:     >42 mL/min  Normal 80 and above  >35 mL/min  
Normal   FEMALE GRF INTERPRETATION:  20-39 YRS:    >60 mL/min  Normal 40-49 YRS:
    >58 mL/min  Normal 50-59 YRS:    >51 mL/min  Normal 60-69 YRS:    >45 mL/min
  Normal 70-79 YRS:    >39 mL/min  Normal 80 and above >32 mL/min  
NormalCLASSIFICATION            CHOLESTEROL FOR ADULTS       
CHILDREN/ADOLESCENTS*   DESIRABLE:                <200 MG/DL                   
<170 MG/DL  BORDER-LINE HIGH RISK:    200-239 MG/DL                170-199 MG/DL
  HIGH RISK:                >240 MG/DL                   >200 MG/DL    CLASS. 
FOR PRIMARY LDL CHOL PREVENTION:        LDL CHOL-CHILD/ADOLESCENTS*   DESIRABLE:
              <130 MG/DL              <110 MG/DL  BORDERLINE-HIGH RISK:   130-
159 MG/DL           110-129 MG/DL  HIGH RISK:              >160 MG/DL           
   >130 MG/DL   *CHILDREN AND ADOLESCENTS REPRESENTS INDIVIDUALA AGED 2-19 YEARS
 EXCLUSIVE. 

 

          Anion Gap 16 mmol/L                               [facility] ([facility]  

 

                                        CHRONIC KIDNEY DISEASE STAGING PER NKF: 

  MALE GFR INTERPRETATION:  20-49 YRS:  

   >60 mL/min  Normal 50-59 YRS:     >56 mL/min  Normal 60-69 YRS:     >49 
mL/min  Normal 70-79 YRS:     >42 mL/min  Normal 80 and above  >35 mL/min  
Normal   FEMALE GRF INTERPRETATION:  20-39 YRS:    >60 mL/min  Normal 40-49 YRS:
    >58 mL/min  Normal 50-59 YRS:    >51 mL/min  Normal 60-69 YRS:    >45 mL/min
  Normal 70-79 YRS:    >39 mL/min  Normal 80 and above >32 mL/min  
NormalCLASSIFICATION            CHOLESTEROL FOR ADULTS       
CHILDREN/ADOLESCENTS*   DESIRABLE:                <200 MG/DL                   
<170 MG/DL  BORDER-LINE HIGH RISK:    200-239 MG/DL                170-199 MG/DL
  HIGH RISK:                >240 MG/DL                   >200 MG/DL    CLASS. 
FOR PRIMARY LDL CHOL PREVENTION:        LDL CHOL-CHILD/ADOLESCENTS*   DESIRABLE:
              <130 MG/DL              <110 MG/DL  BORDERLINE-HIGH RISK:   130-
159 MG/DL           110-129 MG/DL  HIGH RISK:              >160 MG/DL           
   >130 MG/DL   *CHILDREN AND ADOLESCENTS REPRESENTS INDIVIDUALA AGED 2-19 YEARS
 EXCLUSIVE. 

 

          eGFR Non-Afr. American 67 #                                    PRANAY 

([facility]  

 

                                        CHRONIC KIDNEY DISEASE STAGING PER NKF: 

  MALE GFR INTERPRETATION:  20-49 YRS:  

   >60 mL/min  Normal 50-59 YRS:     >56 mL/min  Normal 60-69 YRS:     >49 
mL/min  Normal 70-79 YRS:     >42 mL/min  Normal 80 and above  >35 mL/min  
Normal   FEMALE GRF INTERPRETATION:  20-39 YRS:    >60 mL/min  Normal 40-49 YRS:
    >58 mL/min  Normal 50-59 YRS:    >51 mL/min  Normal 60-69 YRS:    >45 mL/min
  Normal 70-79 YRS:    >39 mL/min  Normal 80 and above >32 mL/min  
NormalCLASSIFICATION            CHOLESTEROL FOR ADULTS       
CHILDREN/ADOLESCENTS*   DESIRABLE:                <200 MG/DL                   
<170 MG/DL  BORDER-LINE HIGH RISK:    200-239 MG/DL                170-199 MG/DL
  HIGH RISK:                >240 MG/DL                   >200 MG/DL    CLASS. 
FOR PRIMARY LDL CHOL PREVENTION:        LDL CHOL-CHILD/ADOLESCENTS*   DESIRABLE:
              <130 MG/DL              <110 MG/DL  BORDERLINE-HIGH RISK:   130-
159 MG/DL           110-129 MG/DL  HIGH RISK:              >160 MG/DL           
   >130 MG/DL   *CHILDREN AND ADOLESCENTS REPRESENTS INDIVIDUALA AGED 2-19 YEARS
 EXCLUSIVE. 









                    ID                  Date                Data Source

 

                    [id number]         2021 01:53:00 PM EDT [facility] ([facility]









          Name      Value     Range     Interpretation Code Description Data Khloe

rce(s) Supporting 

Document(s)

 

           Pathology report site of origin Narrative Laboratory test result     

                             [facility] 

([facility]       

 

                                        No. of containers..01 Sterile Cup SRC:DEREK PEREIRA                  [id number] 

 

           Laboratory test finding (navigational concept) Laboratory test result

                                  

[facility] ([facility]  

 

                                        No. of containers..01 Sterile Cup SRC:DEREK PEREIRA                  [id number] 

 

           Pathology report final diagnosis Narrative Laboratory test result    

                              [facility] 

([facility]       

 

                                        No. of containers..01 Sterile Cup SRC:DEREK PEREIRA                  [id number] 

 

           Laboratory test finding (navigational concept) Laboratory test result

                                  

[facility] ([facility]  

 

                                        No. of containers..01 Sterile Cup SRC:DEREK PEREIRA                  [id number] 

 

             Pathology report comments [Interpretation] Narrative Laboratory kari

t result                           

                          [facility] ([facility]  

 

                                        No. of containers..01 Sterile Cup SRC:DEREK PEREIRA                  [id number] 

 

           Pathologist name Laboratory test result                              

    [facility] ([facility]                        

 

                                        No. of containers..01 Sterile Cup SRC:DEREK PEREIRA                  [id number] 

 

           Pathology report gross observation Narrative Laboratory test result  

                                [facility]

 ([facility]      

 

                                        No. of containers..01 Sterile Cup SRC:DEREK PEREIRA                  [id number] 

 

           Laboratory test finding (navigational concept) Laboratory test result

                                  

[facility] ([facility]  

 

                                        No. of containers..01 Sterile Cup SRC:DEREK PEREIRA                  [id number] 









                    ID                  Date                Data Source

 

                    [id number]         2021 01:49:00 PM EDT [facility] ([facility]









          Name      Value     Range     Interpretation Code Description Data Khloe

rce(s) Supporting 

Document(s)

 

           Urine Culture, Routine Laboratory test result                        

          [facility] ([facility]                        

 

                                        SRC:URINE 

 

           Bacteria identified in Urine by Culture Laboratory test result       

                           [facility] 

([facility]       

 

                                        SRC:URINE 









                    ID                  Date                Data Source

 

                    [id number]         2021 01:48:00 PM EDT [facility] ([facility]









          Name      Value     Range     Interpretation Code Description Data Khloe

rce(s) Supporting 

Document(s)

 

           Comment 1  Laboratory test result                                  ME

DENT ([facility]

                                         

 

           Color Urine Laboratory test result                                  M

[facility] ([facility]                                    

 

          Specific Gravity 1.030     1.00-1.03                     [facility] ([facility] 

 

 

           Appearance of Urine Laboratory test result                           

       [facility] ([facility]                        

 

          PH Urine  6.0       5.0-8.0                       [facility] ([facility]

[facility]  

 

           Bilirubin.total [Presence] in Urine by Test strip Laboratory test res

ult                                  

[facility] ([facility]  

 

           Glucose Urine Laboratory test result                                 

 [facility] ([facility]                                    

 

           Blood Urine Laboratory test result                                  M

[facility] ([facility]                                    

 

           Protein Urine Laboratory test result                                 

 [facility] ([facility]                                    

 

          Ketones   Laboratory test result                               [facility] 

([facility]  

 

          Urobilinogen 1.0 EU/dl 0.2-1.0                       [facility] ([facility]  

 

          Nitrite   Laboratory test result                               [facility] 

([facility]  

 

           Leukocytes Laboratory test result                                  ME

D[facility] ([facility]                                    









                    ID                  Date                Data Source

 

                    [id number]         2021 08:55:00 AM EST [facility] ([facility])









          Name      Value     Range     Interpretation Code Description [facility] 

Document(s)

 

           Surgical pathology study Laboratory test result                      

            [facility] ([facility], )                            

 

                                        FINAL DIAGNOSIS



Colon, polyps, polypectomy:

Adenomatous polyp/tubular adenoma fragments.

                                        2021 - 1037



CLINICAL DIAGNOSIS



H/O colon polyps

                                        2021 - 



GROSS DIAGNOSIS



Received in formalin labeled "colon polyps" is a 1 x 0.4 x 0.4 cm.

aggregate of polyp fragments.  All in one.

                                        -

                                        2021 - 



Signed________ Lydia Castaneda MD 2021 [id number]

 









                    ID                  Date                Data Source

 

                    [id number]         2021 09:00:00 AM EST [facility]









          Name      Value     Range     Interpretation Code Description Data [facility]

rce(s) Supporting 

Document(s)

 

          SARS coronavirus 2 RNA                                         [facility] 

    

 

                                        This lab was ordered by [facility] and reported by [facility]. 









                    ID                  Date                Data Source

 

                    [id number]         2020 01:36:00 PM EST [facility] ([facility]









          Name      Value     Range     Interpretation Code Description Data Khloe

rce(s) Supporting 

Document(s)

 

           Hemoglobin A1c/Hemoglobin.total in Blood 6.0 %      4.50-6.20        

                [facility] ([facility]               









                    ID                  Date                Data Source

 

                    [id number]         2020 11:00:00 AM EST [facility] ([facility], )









          Name      Value     Range     Interpretation Code Description Data Khloe

rce(s) Supporting 

Document(s)

 

          PDFReport Laboratory test result                               [facility] 

([facility], )  

 

          FVC-Pre   3.52 L                                  [facility] ([facility])  

 

          FVC-Pred  4.43 L                                  [facility] ([facility])  

 

          FVC-%Pred-Pre 79 L                                    [facility] ([facility], )  

 

          FVC-LLN   3.49 L                                  [facility] ([facility])  

 

          Fev1-Pred 3.26 L                                  [facility] ([facility])  

 

          Fev1-Pre  2.34 L                                  [facility] ([facility])  

 

          Fev1-%Pred-Pre 71 L                                    [facility] ([facility])  

 

          Fev1-LLN  2.47 L                                  [facility] ([facility])  

 

          Fev6-Pred 4.18 L                                  [facility] ([facility])  

 

          Fev6-Pre  3.52 L                                  [facility] ([facility])  

 

          Fev6-%Pred-Pre 84 L                                    [facility] ([facility])  

 

          Fev1fvc-Pre 67 %                                    [facility] ([facility])  

 

          Fev1fvc-Pred 74 %                                    [facility] ([facility])  

 

          Fev6-LLN  3.27 L                                  [facility] ([facility])  

 

          Fev6fvc-Pred 94 %                                    [facility] ([facility])  

 

          Ugz7wav-%Pred-Pre 90 %                                    [facility] ([facility])  

 

          Ksn9clq-LDX 64 %                                    [facility] ([facility])  

 

          Cvf7dml-%Pred-Pre 105 %                                   [facility] ([facility])  

 

          Fev6fvc-Pre 100 %                                   [facility] ([facility])  

 

          FEFMax-Pred 8.36 L/E/sec                               [facility] ([facility])  

 

          FEFMax-%Pred-Pre 69 L/E/sec                               [facility] ([facility])  

 

          FEFMax-LLN 6.04 L/E/sec                               [facility] ([facility])  

 

          FEFMax-Pre 5.83 L/E/sec                               [facility] ([facility])  

 

          Pyu8052-%Pred-Pre 53 L/E/sec                               [facility] ([facility])  

 

          Fef2575-Pre 1.31 L/E/sec                               [facility] ([facility])  

 

          Fef2575-Pred 2.47 L/E/sec                               [facility] ([facility])  

 

          Fev1fev6-Pred 78 %                                    [facility] ([facility])  

 

          Ywc1436-ZGI 0.87 L/E/sec                               [facility] ([facility])  

 

          ExpTime-Pre 6.31 sec                                [facility] ([facility])  

 

          Fev1fev6-Pre 67 %                                    [facility] ([facility])  

 

          Ief9jiw1-WOV 69 %                                    [facility] ([facility])  

 

          Hts9pfh0-%Pred-Pre 85 %                                    [facility] ([facility])  









                    ID                  Date                Data Source

 

                    [id number]         2020 12:00:00 AM EST [facility]









          Name      Value     Range     Interpretation Code Description Data Khloe

rce(s) Supporting 

Document(s)

 

          SARS coronavirus 2 Ag                                         [facility]  

   

 

                                        This lab was ordered by [facility] and reported by [facility]. 









                    ID                  Date                Data Source

 

                    [id number]         10/06/2020 02:09:00 PM EDT [facility] ([facility]









          Name      Value     Range     Interpretation Code Description Data Khloe

rce(s) Supporting 

Document(s)

 

                Glucose [Mass/volume] in Capillary blood by Glucometer 144 mg/dL

                 Above 

high normal                             [facility] ([facility]

)  









                    ID                  Date                Data Source

 

                    [id number]         10/06/2020 02:09:00 PM EDT [facility] ([facility])









          Name      Value     Range     Interpretation Code Description Data Khloe

rce(s) Supporting 

Document(s)

 

                Glucose [Mass/volume] in Capillary blood by Glucometer 144 mg/dL

                 Above 

high normal                             [facility] ([facility]) 

 









                    ID                  Date                Data Source

 

                    [id number]         10/06/2020 10:46:00 AM EDT [facility] ([facility]









          Name      Value     Range     Interpretation Code Description Data Khloe

rce(s) Supporting 

Document(s)

 

                Glucose [Mass/volume] in Capillary blood by Glucometer 130 mg/dL

                 Above 

high normal                             [facility] ([facility]

)  









                    ID                  Date                Data Source

 

                    [id number]         10/06/2020 10:46:00 AM EDT [facility] ([facility])









          Name      Value     Range     Interpretation Code Description Data Khloe

rce(s) Supporting 

Document(s)

 

                Glucose [Mass/volume] in Capillary blood by Glucometer 130 mg/dL

                 Above 

high normal                             [facility] ([facility]) 

 









                    ID                  Date                Data Source

 

                    [id number]         10/01/2020 10:00:00 AM EDT [facility]









          Name      Value     Range     Interpretation Code Description Data Khloe

rce(s) Supporting 

Document(s)

 

          SARS coronavirus 2 RNA                                         [facility]

    

 

                                        This lab was ordered by [facility] and reported by [facility]. 









                    ID                  Date                Data Source

 

                    [id number]         2020 01:59:00 PM EDT [facility] ([facility]









          Name      Value     Range     Interpretation Code Description Data Khloe

rce(s) Supporting 

Document(s)

 

           Hemoglobin A1c/Hemoglobin.total in Blood 6.1 %      4.50-6.20        

                [facility] ([facility]               









                    ID                  Date                Data Source

 

                    [id number]         2020 01:59:00 PM EDT [facility] ([facility]









          Name      Value     Range     Interpretation Code Description Data Khloe

rce(s) Supporting 

Document(s)

 

           Glu        198 mg/dL       Above high normal            [facility] 

([facility]                                    

 

                                        NORMAL RANGES 

****************************************************************************** 
Age         WBC      RBC        HGB           HCT         MCV        PLT 
------------------------------------------------------------------------------ 
Adult M   4.1-10.9    4.20-6.30   12.0-18.0   37.0-51.0   80-97      140-440  
Adult F   4.1-10.9    4.04-5.48   12.0-18.0   37.0-51.0   80-97      140-440  0
-1 Yr    5.0-20.0    3.9-5.9     15-18       MV: 44      MV: 91     MV: 277  2-9
 Yr.   6.0-17.0    3.8-5.4     11-13       MV: 37      MV: 78     MV: 300  10 
Yrs.   5.0-13.0    3.8-5.4     12-15       MV: 39      MV: 80     MV: 250    
NOTE:  * FOR ADULT BLACK MALES AND FEMALES, NORMAL WBC IS 2.9-7.7 K/ML  * FOR 
ADULT BLACK MALES AND FEMALES, NORMAL RBC,HGB, AND HCT IS 5% LESS  SOURCE FOR 
DATA: [employer] 1800 OPERATION MANUAL( AUTOMATED BLOOD COUNTS AND DIFF.)  APPENDIX
  B-3                      CHRONIC KIDNEY DISEASE STAGING PER NKF:   MALE GFR 
INTERPRETATION:  20-49 YRS:     >60 mL/min  Normal 50-59 YRS:     >56 mL/min  
Normal 60-69 YRS:     >49 mL/min  Normal 70-79 YRS:     >42 mL/min  Normal 80 
and above  >35 mL/min  Normal   FEMALE GRF INTERPRETATION:  20-39 YRS:    >60 
mL/min  Normal 40-49 YRS:    >58 mL/min  Normal 50-59 YRS:    >51 mL/min  Normal
 60-69 YRS:    >45 mL/min  Normal 70-79 YRS:    >39 mL/min  Normal 80 and above 
>32 mL/min  Normal 

 

          BUN       22 mg/dL  8-                          [facility]

[facility]  

 

                                        NORMAL RANGES 

****************************************************************************** 
Age         WBC      RBC        HGB           HCT         MCV        PLT 
------------------------------------------------------------------------------ 
Adult M   4.1-10.9    4.20-6.30   12.0-18.0   37.0-51.0   80-97      140-440  
Adult F   4.1-10.9    4.04-5.48   12.0-18.0   37.0-51.0   80-97      140-440  0
-1 Yr    5.0-20.0    3.9-5.9     15-18       MV: 44      MV: 91     MV: 277  2-9
 Yr.   6.0-17.0    3.8-5.4     11-13       MV: 37      MV: 78     MV: 300  10 
Yrs.   5.0-13.0    3.8-5.4     12-15       MV: 39      MV: 80     MV: 250    
NOTE:  * FOR ADULT BLACK MALES AND FEMALES, NORMAL WBC IS 2.9-7.7 K/ML  * FOR 
ADULT BLACK MALES AND FEMALES, NORMAL RBC,HGB, AND HCT IS 5% LESS  SOURCE FOR 
DATA: [employer] 1800 OPERATION MANUAL( AUTOMATED BLOOD COUNTS AND DIFF.)  APPENDIX
  B-3                      CHRONIC KIDNEY DISEASE STAGING PER NKF:   MALE GFR 
INTERPRETATION:  20-49 YRS:     >60 mL/min  Normal 50-59 YRS:     >56 mL/min  
Normal 60-69 YRS:     >49 mL/min  Normal 70-79 YRS:     >42 mL/min  Normal 80 
and above  >35 mL/min  Normal   FEMALE GRF INTERPRETATION:  20-39 YRS:    >60 
mL/min  Normal 40-49 YRS:    >58 mL/min  Normal 50-59 YRS:    >51 mL/min  Normal
 60-69 YRS:    >45 mL/min  Normal 70-79 YRS:    >39 mL/min  Normal 80 and above 
>32 mL/min  Normal 

 

          Creat     1.2 mg/dL 0.7-1.2                       [facility]

[facility]  

 

                                        NORMAL RANGES 

****************************************************************************** 
Age         WBC      RBC        HGB           HCT         MCV        PLT 
------------------------------------------------------------------------------ 
Adult M   4.1-10.9    4.20-6.30   12.0-18.0   37.0-51.0   80-97      140-440  
Adult F   4.1-10.9    4.04-5.48   12.0-18.0   37.0-51.0   80-97      140-440  0
-1 Yr    5.0-20.0    3.9-5.9     15-18       MV: 44      MV: 91     MV: 277  2-9
 Yr.   6.0-17.0    3.8-5.4     11-13       MV: 37      MV: 78     MV: 300  10 
Yrs.   5.0-13.0    3.8-5.4     12-15       MV: 39      MV: 80     MV: 250    
NOTE:  * FOR ADULT BLACK MALES AND FEMALES, NORMAL WBC IS 2.9-7.7 K/ML  * FOR 
ADULT BLACK MALES AND FEMALES, NORMAL RBC,HGB, AND HCT IS 5% LESS  SOURCE FOR 
DATA: [employer] 1800 OPERATION MANUAL( AUTOMATED BLOOD COUNTS AND DIFF.)  APPENDIX
  B-3                      CHRONIC KIDNEY DISEASE STAGING PER NKF:   MALE GFR 
INTERPRETATION:  20-49 YRS:     >60 mL/min  Normal 50-59 YRS:     >56 mL/min  
Normal 60-69 YRS:     >49 mL/min  Normal 70-79 YRS:     >42 mL/min  Normal 80 
and above  >35 mL/min  Normal   FEMALE GRF INTERPRETATION:  20-39 YRS:    >60 
mL/min  Normal 40-49 YRS:    >58 mL/min  Normal 50-59 YRS:    >51 mL/min  Normal
 60-69 YRS:    >45 mL/min  Normal 70-79 YRS:    >39 mL/min  Normal 80 and above 
>32 mL/min  Normal 

 

          BUN/Creatinine Ratio 18.4 CALC                               [employer] ([facility]  

 

                                        NORMAL RANGES 

****************************************************************************** 
Age         WBC      RBC        HGB           HCT         MCV        PLT 
------------------------------------------------------------------------------ 
Adult M   4.1-10.9    4.20-6.30   12.0-18.0   37.0-51.0   80-97      140-440  
Adult F   4.1-10.9    4.04-5.48   12.0-18.0   37.0-51.0   80-97      140-440  0
-1 Yr    5.0-20.0    3.9-5.9     15-18       MV: 44      MV: 91     MV: 277  2-9
 Yr.   6.0-17.0    3.8-5.4     11-13       MV: 37      MV: 78     MV: 300  10 
Yrs.   5.0-13.0    3.8-5.4     12-15       MV: 39      MV: 80     MV: 250    
NOTE:  * FOR ADULT BLACK MALES AND FEMALES, NORMAL WBC IS 2.9-7.7 K/ML  * FOR 
ADULT BLACK MALES AND FEMALES, NORMAL RBC,HGB, AND HCT IS 5% LESS  SOURCE FOR 
DATA: [employer] 1800 OPERATION MANUAL( AUTOMATED BLOOD COUNTS AND DIFF.)  APPENDIX
  B-3                      CHRONIC KIDNEY DISEASE STAGING PER NKF:   MALE GFR 
INTERPRETATION:  20-49 YRS:     >60 mL/min  Normal 50-59 YRS:     >56 mL/min  
Normal 60-69 YRS:     >49 mL/min  Normal 70-79 YRS:     >42 mL/min  Normal 80 
and above  >35 mL/min  Normal   FEMALE GRF INTERPRETATION:  20-39 YRS:    >60 
mL/min  Normal 40-49 YRS:    >58 mL/min  Normal 50-59 YRS:    >51 mL/min  Normal
 60-69 YRS:    >45 mL/min  Normal 70-79 YRS:    >39 mL/min  Normal 80 and above 
>32 mL/min  Normal 

 

          CL        101.1 mmol/L 98.0-107.0                     [facility]  

 

                                        NORMAL RANGES 

****************************************************************************** 
Age         WBC      RBC        HGB           HCT         MCV        PLT 
------------------------------------------------------------------------------ 
Adult M   4.1-10.9    4.20-6.30   12.0-18.0   37.0-51.0   80-97      140-440  
Adult F   4.1-10.9    4.04-5.48   12.0-18.0   37.0-51.0   80-97      140-440  0
-1 Yr    5.0-20.0    3.9-5.9     15-18       MV: 44      MV: 91     MV: 277  2-9
 Yr.   6.0-17.0    3.8-5.4     11-13       MV: 37      MV: 78     MV: 300  10 
Yrs.   5.0-13.0    3.8-5.4     12-15       MV: 39      MV: 80     MV: 250    
NOTE:  * FOR ADULT BLACK MALES AND FEMALES, NORMAL WBC IS 2.9-7.7 K/ML  * FOR 
ADULT BLACK MALES AND FEMALES, NORMAL RBC,HGB, AND HCT IS 5% LESS  SOURCE FOR 
DATA: [employer] 1800 OPERATION MANUAL( AUTOMATED BLOOD COUNTS AND DIFF.)  APPENDIX
  B-3                      CHRONIC KIDNEY DISEASE STAGING PER NKF:   MALE GFR 
INTERPRETATION:  20-49 YRS:     >60 mL/min  Normal 50-59 YRS:     >56 mL/min  
Normal 60-69 YRS:     >49 mL/min  Normal 70-79 YRS:     >42 mL/min  Normal 80 
and above  >35 mL/min  Normal   FEMALE GRF INTERPRETATION:  20-39 YRS:    >60 
mL/min  Normal 40-49 YRS:    >58 mL/min  Normal 50-59 YRS:    >51 mL/min  Normal
 60-69 YRS:    >45 mL/min  Normal 70-79 YRS:    >39 mL/min  Normal 80 and above 
>32 mL/min  Normal 

 

          K         4.0 mmol/L 3.5-5.1                       [facility] ([facility]  

 

                                        NORMAL RANGES 

****************************************************************************** 
Age         WBC      RBC        HGB           HCT         MCV        PLT 
------------------------------------------------------------------------------ 
Adult M   4.1-10.9    4.20-6.30   12.0-18.0   37.0-51.0   80-97      140-440  
Adult F   4.1-10.9    4.04-5.48   12.0-18.0   37.0-51.0   80-97      140-440  0
-1 Yr    5.0-20.0    3.9-5.9     15-18       MV: 44      MV: 91     MV: 277  2-9
 Yr.   6.0-17.0    3.8-5.4     11-13       MV: 37      MV: 78     MV: 300  10 
Yrs.   5.0-13.0    3.8-5.4     12-15       MV: 39      MV: 80     MV: 250    
NOTE:  * FOR ADULT BLACK MALES AND FEMALES, NORMAL WBC IS 2.9-7.7 K/ML  * FOR 
ADULT BLACK MALES AND FEMALES, NORMAL RBC,HGB, AND HCT IS 5% LESS  SOURCE FOR 
DATA: [employer] 1800 OPERATION MANUAL( AUTOMATED BLOOD COUNTS AND DIFF.)  APPENDIX
  B-3                      CHRONIC KIDNEY DISEASE STAGING PER NKF:   MALE GFR 
INTERPRETATION:  20-49 YRS:     >60 mL/min  Normal 50-59 YRS:     >56 mL/min  
Normal 60-69 YRS:     >49 mL/min  Normal 70-79 YRS:     >42 mL/min  Normal 80 
and above  >35 mL/min  Normal   FEMALE GRF INTERPRETATION:  20-39 YRS:    >60 
mL/min  Normal 40-49 YRS:    >58 mL/min  Normal 50-59 YRS:    >51 mL/min  Normal
 60-69 YRS:    >45 mL/min  Normal 70-79 YRS:    >39 mL/min  Normal 80 and above 
>32 mL/min  Normal 

 

          Na        138 mmol/L 136-145                       [facility] ([facility]  

 

                                        NORMAL RANGES 

****************************************************************************** 
Age         WBC      RBC        HGB           HCT         MCV        PLT 
------------------------------------------------------------------------------ 
Adult M   4.1-10.9    4.20-6.30   12.0-18.0   37.0-51.0   80-97      140-440  
Adult F   4.1-10.9    4.04-5.48   12.0-18.0   37.0-51.0   80-97      140-440  0
-1 Yr    5.0-20.0    3.9-5.9     15-18       MV: 44      MV: 91     MV: 277  2-9
 Yr.   6.0-17.0    3.8-5.4     11-13       MV: 37      MV: 78     MV: 300  10 
Yrs.   5.0-13.0    3.8-5.4     12-15       MV: 39      MV: 80     MV: 250    
NOTE:  * FOR ADULT BLACK MALES AND FEMALES, NORMAL WBC IS 2.9-7.7 K/ML  * FOR 
ADULT BLACK MALES AND FEMALES, NORMAL RBC,HGB, AND HCT IS 5% LESS  SOURCE FOR 
DATA: [employer] 1800 OPERATION MANUAL( AUTOMATED BLOOD COUNTS AND DIFF.)  APPENDIX
  B-3                      CHRONIC KIDNEY DISEASE STAGING PER NKF:   MALE GFR 
INTERPRETATION:  20-49 YRS:     >60 mL/min  Normal 50-59 YRS:     >56 mL/min  
Normal 60-69 YRS:     >49 mL/min  Normal 70-79 YRS:     >42 mL/min  Normal 80 
and above  >35 mL/min  Normal   FEMALE GRF INTERPRETATION:  20-39 YRS:    >60 
mL/min  Normal 40-49 YRS:    >58 mL/min  Normal 50-59 YRS:    >51 mL/min  Normal
 60-69 YRS:    >45 mL/min  Normal 70-79 YRS:    >39 mL/min  Normal 80 and above 
>32 mL/min  Normal 

 

          CA        9.4 mg/dL 8.6-10.2                      [facility]  

 

                                        NORMAL RANGES 

****************************************************************************** 
Age         WBC      RBC        HGB           HCT         MCV        PLT 
------------------------------------------------------------------------------ 
Adult M   4.1-10.9    4.20-6.30   12.0-18.0   37.0-51.0   80-97      140-440  
Adult F   4.1-10.9    4.04-5.48   12.0-18.0   37.0-51.0   80-97      140-440  0
-1 Yr    5.0-20.0    3.9-5.9     15-18       MV: 44      MV: 91     MV: 277  2-9
 Yr.   6.0-17.0    3.8-5.4     11-13       MV: 37      MV: 78     MV: 300  10 
Yrs.   5.0-13.0    3.8-5.4     12-15       MV: 39      MV: 80     MV: 250    
NOTE:  * FOR ADULT BLACK MALES AND FEMALES, NORMAL WBC IS 2.9-7.7 K/ML  * FOR 
ADULT BLACK MALES AND FEMALES, NORMAL RBC,HGB, AND HCT IS 5% LESS  SOURCE FOR 
DATA: [employer] 1800 OPERATION MANUAL( AUTOMATED BLOOD COUNTS AND DIFF.)  APPENDIX
  B-3                      CHRONIC KIDNEY DISEASE STAGING PER NKF:   MALE GFR 
INTERPRETATION:  20-49 YRS:     >60 mL/min  Normal 50-59 YRS:     >56 mL/min  
Normal 60-69 YRS:     >49 mL/min  Normal 70-79 YRS:     >42 mL/min  Normal 80 
and above  >35 mL/min  Normal   FEMALE GRF INTERPRETATION:  20-39 YRS:    >60 
mL/min  Normal 40-49 YRS:    >58 mL/min  Normal 50-59 YRS:    >51 mL/min  Normal
 60-69 YRS:    >45 mL/min  Normal 70-79 YRS:    >39 mL/min  Normal 80 and above 
>32 mL/min  Normal 

 

          Co2       23.7 mmol/L 22.0-29.0                     [facility] ([facility]  

 

                                        NORMAL RANGES 

****************************************************************************** 
Age         WBC      RBC        HGB           HCT         MCV        PLT 
------------------------------------------------------------------------------ 
Adult M   4.1-10.9    4.20-6.30   12.0-18.0   37.0-51.0   80-97      140-440  
Adult F   4.1-10.9    4.04-5.48   12.0-18.0   37.0-51.0   80-97      140-440  0
-1 Yr    5.0-20.0    3.9-5.9     15-18       MV: 44      MV: 91     MV: 277  2-9
 Yr.   6.0-17.0    3.8-5.4     11-13       MV: 37      MV: 78     MV: 300  10 
Yrs.   5.0-13.0    3.8-5.4     12-15       MV: 39      MV: 80     MV: 250    
NOTE:  * FOR ADULT BLACK MALES AND FEMALES, NORMAL WBC IS 2.9-7.7 K/ML  * FOR 
ADULT BLACK MALES AND FEMALES, NORMAL RBC,HGB, AND HCT IS 5% LESS  SOURCE FOR 
DATA: [employer] 1800 OPERATION MANUAL( AUTOMATED BLOOD COUNTS AND DIFF.)  APPENDIX
  B-3                      CHRONIC KIDNEY DISEASE STAGING PER NKF:   MALE GFR 
INTERPRETATION:  20-49 YRS:     >60 mL/min  Normal 50-59 YRS:     >56 mL/min  
Normal 60-69 YRS:     >49 mL/min  Normal 70-79 YRS:     >42 mL/min  Normal 80 
and above  >35 mL/min  Normal   FEMALE GRF INTERPRETATION:  20-39 YRS:    >60 
mL/min  Normal 40-49 YRS:    >58 mL/min  Normal 50-59 YRS:    >51 mL/min  Normal
 60-69 YRS:    >45 mL/min  Normal 70-79 YRS:    >39 mL/min  Normal 80 and above 
>32 mL/min  Normal 

 

           TP         6.5 g/dL   6.6-8.7    Below low normal            [facility]

[facility]

                                         

 

                                        NORMAL RANGES 

****************************************************************************** 
Age         WBC      RBC        HGB           HCT         MCV        PLT 
------------------------------------------------------------------------------ 
Adult M   4.1-10.9    4.20-6.30   12.0-18.0   37.0-51.0   80-97      140-440  
Adult F   4.1-10.9    4.04-5.48   12.0-18.0   37.0-51.0   80-97      140-440  0
-1 Yr    5.0-20.0    3.9-5.9     15-18       MV: 44      MV: 91     MV: 277  2-9
 Yr.   6.0-17.0    3.8-5.4     11-13       MV: 37      MV: 78     MV: 300  10 
Yrs.   5.0-13.0    3.8-5.4     12-15       MV: 39      MV: 80     MV: 250    
NOTE:  * FOR ADULT BLACK MALES AND FEMALES, NORMAL WBC IS 2.9-7.7 K/ML  * FOR 
ADULT BLACK MALES AND FEMALES, NORMAL RBC,HGB, AND HCT IS 5% LESS  SOURCE FOR 
DATA: [employer] 1800 OPERATION MANUAL( AUTOMATED BLOOD COUNTS AND DIFF.)  APPENDIX
  B-3                      CHRONIC KIDNEY DISEASE STAGING PER NKF:   MALE GFR 
INTERPRETATION:  20-49 YRS:     >60 mL/min  Normal 50-59 YRS:     >56 mL/min  
Normal 60-69 YRS:     >49 mL/min  Normal 70-79 YRS:     >42 mL/min  Normal 80 
and above  >35 mL/min  Normal   FEMALE GRF INTERPRETATION:  20-39 YRS:    >60 
mL/min  Normal 40-49 YRS:    >58 mL/min  Normal 50-59 YRS:    >51 mL/min  Normal
 60-69 YRS:    >45 mL/min  Normal 70-79 YRS:    >39 mL/min  Normal 80 and above 
>32 mL/min  Normal 

 

          A/G Ratio 2.2 CALC                                [facility] ([facility]

[facility]  

 

                                        NORMAL RANGES 

****************************************************************************** 
Age         WBC      RBC        HGB           HCT         MCV        PLT 
------------------------------------------------------------------------------ 
Adult M   4.1-10.9    4.20-6.30   12.0-18.0   37.0-51.0   80-97      140-440  
Adult F   4.1-10.9    4.04-5.48   12.0-18.0   37.0-51.0   80-97      140-440  0
-1 Yr    5.0-20.0    3.9-5.9     15-18       MV: 44      MV: 91     MV: 277  2-9
 Yr.   6.0-17.0    3.8-5.4     11-13       MV: 37      MV: 78     MV: 300  10 
Yrs.   5.0-13.0    3.8-5.4     12-15       MV: 39      MV: 80     MV: 250    
NOTE:  * FOR ADULT BLACK MALES AND FEMALES, NORMAL WBC IS 2.9-7.7 K/ML  * FOR 
ADULT BLACK MALES AND FEMALES, NORMAL RBC,HGB, AND HCT IS 5% LESS  SOURCE FOR 
DATA: [employer] 1800 OPERATION MANUAL( AUTOMATED BLOOD COUNTS AND DIFF.)  APPENDIX
  B-3                      CHRONIC KIDNEY DISEASE STAGING PER NKF:   MALE GFR 
INTERPRETATION:  20-49 YRS:     >60 mL/min  Normal 50-59 YRS:     >56 mL/min  
Normal 60-69 YRS:     >49 mL/min  Normal 70-79 YRS:     >42 mL/min  Normal 80 
and above  >35 mL/min  Normal   FEMALE GRF INTERPRETATION:  20-39 YRS:    >60 
mL/min  Normal 40-49 YRS:    >58 mL/min  Normal 50-59 YRS:    >51 mL/min  Normal
 60-69 YRS:    >45 mL/min  Normal 70-79 YRS:    >39 mL/min  Normal 80 and above 
>32 mL/min  Normal 

 

          Alb       4.5 g/dL  3.5-5.2                       [facility] ([facility]

[facility]  

 

                                        NORMAL RANGES 

****************************************************************************** 
Age         WBC      RBC        HGB           HCT         MCV        PLT 
------------------------------------------------------------------------------ 
Adult M   4.1-10.9    4.20-6.30   12.0-18.0   37.0-51.0   80-97      140-440  
Adult F   4.1-10.9    4.04-5.48   12.0-18.0   37.0-51.0   80-97      140-440  0
-1 Yr    5.0-20.0    3.9-5.9     15-18       MV: 44      MV: 91     MV: 277  2-9
 Yr.   6.0-17.0    3.8-5.4     11-13       MV: 37      MV: 78     MV: 300  10 
Yrs.   5.0-13.0    3.8-5.4     12-15       MV: 39      MV: 80     MV: 250    
NOTE:  * FOR ADULT BLACK MALES AND FEMALES, NORMAL WBC IS 2.9-7.7 K/ML  * FOR 
ADULT BLACK MALES AND FEMALES, NORMAL RBC,HGB, AND HCT IS 5% LESS  SOURCE FOR 
DATA: [employer] 1800 OPERATION MANUAL( AUTOMATED BLOOD COUNTS AND DIFF.)  APPENDIX
  B-3                      CHRONIC KIDNEY DISEASE STAGING PER NKF:   MALE GFR 
INTERPRETATION:  20-49 YRS:     >60 mL/min  Normal 50-59 YRS:     >56 mL/min  
Normal 60-69 YRS:     >49 mL/min  Normal 70-79 YRS:     >42 mL/min  Normal 80 
and above  >35 mL/min  Normal   FEMALE GRF INTERPRETATION:  20-39 YRS:    >60 
mL/min  Normal 40-49 YRS:    >58 mL/min  Normal 50-59 YRS:    >51 mL/min  Normal
 60-69 YRS:    >45 mL/min  Normal 70-79 YRS:    >39 mL/min  Normal 80 and above 
>32 mL/min  Normal 

 

          Globulin  2.0 CALC                                [facility]

[facility]  

 

                                        NORMAL RANGES 

****************************************************************************** 
Age         WBC      RBC        HGB           HCT         MCV        PLT 
------------------------------------------------------------------------------ 
Adult M   4.1-10.9    4.20-6.30   12.0-18.0   37.0-51.0   80-97      140-440  
Adult F   4.1-10.9    4.04-5.48   12.0-18.0   37.0-51.0   80-97      140-440  0
-1 Yr    5.0-20.0    3.9-5.9     15-18       MV: 44      MV: 91     MV: 277  2-9
 Yr.   6.0-17.0    3.8-5.4     11-13       MV: 37      MV: 78     MV: 300  10 
Yrs.   5.0-13.0    3.8-5.4     12-15       MV: 39      MV: 80     MV: 250    
NOTE:  * FOR ADULT BLACK MALES AND FEMALES, NORMAL WBC IS 2.9-7.7 K/ML  * FOR 
ADULT BLACK MALES AND FEMALES, NORMAL RBC,HGB, AND HCT IS 5% LESS  SOURCE FOR 
DATA: [employer] 1800 OPERATION MANUAL( AUTOMATED BLOOD COUNTS AND DIFF.)  APPENDIX
  B-3                      CHRONIC KIDNEY DISEASE STAGING PER NKF:   MALE GFR 
INTERPRETATION:  20-49 YRS:     >60 mL/min  Normal 50-59 YRS:     >56 mL/min  
Normal 60-69 YRS:     >49 mL/min  Normal 70-79 YRS:     >42 mL/min  Normal 80 
and above  >35 mL/min  Normal   FEMALE GRF INTERPRETATION:  20-39 YRS:    >60 
mL/min  Normal 40-49 YRS:    >58 mL/min  Normal 50-59 YRS:    >51 mL/min  Normal
 60-69 YRS:    >45 mL/min  Normal 70-79 YRS:    >39 mL/min  Normal 80 and above 
>32 mL/min  Normal 

 

          Alt (SGPT) 13 U/L    0-41                          [facility]  

 

                                        NORMAL RANGES 

****************************************************************************** 
Age         WBC      RBC        HGB           HCT         MCV        PLT 
------------------------------------------------------------------------------ 
Adult M   4.1-10.9    4.20-6.30   12.0-18.0   37.0-51.0   80-97      140-440  
Adult F   4.1-10.9    4.04-5.48   12.0-18.0   37.0-51.0   80-97      140-440  0
-1 Yr    5.0-20.0    3.9-5.9     15-18       MV: 44      MV: 91     MV: 277  2-9
 Yr.   6.0-17.0    3.8-5.4     11-13       MV: 37      MV: 78     MV: 300  10 
Yrs.   5.0-13.0    3.8-5.4     12-15       MV: 39      MV: 80     MV: 250    
NOTE:  * FOR ADULT BLACK MALES AND FEMALES, NORMAL WBC IS 2.9-7.7 K/ML  * FOR 
ADULT BLACK MALES AND FEMALES, NORMAL RBC,HGB, AND HCT IS 5% LESS  SOURCE FOR 
DATA: [employer] 1800 OPERATION MANUAL( AUTOMATED BLOOD COUNTS AND DIFF.)  APPENDIX
  B-3                      CHRONIC KIDNEY DISEASE STAGING PER NKF:   MALE GFR 
INTERPRETATION:  20-49 YRS:     >60 mL/min  Normal 50-59 YRS:     >56 mL/min  
Normal 60-69 YRS:     >49 mL/min  Normal 70-79 YRS:     >42 mL/min  Normal 80 
and above  >35 mL/min  Normal   FEMALE GRF INTERPRETATION:  20-39 YRS:    >60 
mL/min  Normal 40-49 YRS:    >58 mL/min  Normal 50-59 YRS:    >51 mL/min  Normal
 60-69 YRS:    >45 mL/min  Normal 70-79 YRS:    >39 mL/min  Normal 80 and above 
>32 mL/min  Normal 

 

          Alp       81.9 U/L                          [facility] ([facility]

[facility]  

 

                                        NORMAL RANGES 

****************************************************************************** 
Age         WBC      RBC        HGB           HCT         MCV        PLT 
------------------------------------------------------------------------------ 
Adult M   4.1-10.9    4.20-6.30   12.0-18.0   37.0-51.0   80-97      140-440  
Adult F   4.1-10.9    4.04-5.48   12.0-18.0   37.0-51.0   80-97      140-440  0
-1 Yr    5.0-20.0    3.9-5.9     15-18       MV: 44      MV: 91     MV: 277  2-9
 Yr.   6.0-17.0    3.8-5.4     11-13       MV: 37      MV: 78     MV: 300  10 
Yrs.   5.0-13.0    3.8-5.4     12-15       MV: 39      MV: 80     MV: 250    
NOTE:  * FOR ADULT BLACK MALES AND FEMALES, NORMAL WBC IS 2.9-7.7 K/ML  * FOR 
ADULT BLACK MALES AND FEMALES, NORMAL RBC,HGB, AND HCT IS 5% LESS  SOURCE FOR 
DATA: [employer] 1800 OPERATION MANUAL( AUTOMATED BLOOD COUNTS AND DIFF.)  APPENDIX
  B-3                      CHRONIC KIDNEY DISEASE STAGING PER NKF:   MALE GFR 
INTERPRETATION:  20-49 YRS:     >60 mL/min  Normal 50-59 YRS:     >56 mL/min  
Normal 60-69 YRS:     >49 mL/min  Normal 70-79 YRS:     >42 mL/min  Normal 80 
and above  >35 mL/min  Normal   FEMALE GRF INTERPRETATION:  20-39 YRS:    >60 
mL/min  Normal 40-49 YRS:    >58 mL/min  Normal 50-59 YRS:    >51 mL/min  Normal
 60-69 YRS:    >45 mL/min  Normal 70-79 YRS:    >39 mL/min  Normal 80 and above 
>32 mL/min  Normal 

 

           Osmolality-Calculated 283.7 CALC                                  [facility]

[facility] ([facility]

                                         

 

                                        NORMAL RANGES 

****************************************************************************** 
Age         WBC      RBC        HGB           HCT         MCV        PLT 
------------------------------------------------------------------------------ 
Adult M   4.1-10.9    4.20-6.30   12.0-18.0   37.0-51.0   80-97      140-440  
Adult F   4.1-10.9    4.04-5.48   12.0-18.0   37.0-51.0   80-97      140-440  0
-1 Yr    5.0-20.0    3.9-5.9     15-18       MV: 44      MV: 91     MV: 277  2-9
 Yr.   6.0-17.0    3.8-5.4     11-13       MV: 37      MV: 78     MV: 300  10 
Yrs.   5.0-13.0    3.8-5.4     12-15       MV: 39      MV: 80     MV: 250    
NOTE:  * FOR ADULT BLACK MALES AND FEMALES, NORMAL WBC IS 2.9-7.7 K/ML  * FOR 
ADULT BLACK MALES AND FEMALES, NORMAL RBC,HGB, AND HCT IS 5% LESS  SOURCE FOR 
DATA: [employer] 1800 OPERATION MANUAL( AUTOMATED BLOOD COUNTS AND DIFF.)  APPENDIX
  B-3                      CHRONIC KIDNEY DISEASE STAGING PER NKF:   MALE GFR 
INTERPRETATION:  20-49 YRS:     >60 mL/min  Normal 50-59 YRS:     >56 mL/min  
Normal 60-69 YRS:     >49 mL/min  Normal 70-79 YRS:     >42 mL/min  Normal 80 
and above  >35 mL/min  Normal   FEMALE GRF INTERPRETATION:  20-39 YRS:    >60 
mL/min  Normal 40-49 YRS:    >58 mL/min  Normal 50-59 YRS:    >51 mL/min  Normal
 60-69 YRS:    >45 mL/min  Normal 70-79 YRS:    >39 mL/min  Normal 80 and above 
>32 mL/min  Normal 

 

          Ast (Sgot) 12 U/L    0-40                          [facility] ([facility]  

 

                                        NORMAL RANGES 

****************************************************************************** 
Age         WBC      RBC        HGB           HCT         MCV        PLT 
------------------------------------------------------------------------------ 
Adult M   4.1-10.9    4.20-6.30   12.0-18.0   37.0-51.0   80-97      140-440  
Adult F   4.1-10.9    4.04-5.48   12.0-18.0   37.0-51.0   80-97      140-440  0
-1 Yr    5.0-20.0    3.9-5.9     15-18       MV: 44      MV: 91     MV: 277  2-9
 Yr.   6.0-17.0    3.8-5.4     11-13       MV: 37      MV: 78     MV: 300  10 
Yrs.   5.0-13.0    3.8-5.4     12-15       MV: 39      MV: 80     MV: 250    
NOTE:  * FOR ADULT BLACK MALES AND FEMALES, NORMAL WBC IS 2.9-7.7 K/ML  * FOR 
ADULT BLACK MALES AND FEMALES, NORMAL RBC,HGB, AND HCT IS 5% LESS  SOURCE FOR 
DATA: [employer] 1800 OPERATION MANUAL( AUTOMATED BLOOD COUNTS AND DIFF.)  APPENDIX
  B-3                      CHRONIC KIDNEY DISEASE STAGING PER NKF:   MALE GFR 
INTERPRETATION:  20-49 YRS:     >60 mL/min  Normal 50-59 YRS:     >56 mL/min  
Normal 60-69 YRS:     >49 mL/min  Normal 70-79 YRS:     >42 mL/min  Normal 80 
and above  >35 mL/min  Normal   FEMALE GRF INTERPRETATION:  20-39 YRS:    >60 
mL/min  Normal 40-49 YRS:    >58 mL/min  Normal 50-59 YRS:    >51 mL/min  Normal
 60-69 YRS:    >45 mL/min  Normal 70-79 YRS:    >39 mL/min  Normal 80 and above 
>32 mL/min  Normal 

 

          Tbili     0.60 mg/dL 0.0-1.2                       [facility] ([facility]

[facility]  

 

                                        NORMAL RANGES 

****************************************************************************** 
Age         WBC      RBC        HGB           HCT         MCV        PLT 
------------------------------------------------------------------------------ 
Adult M   4.1-10.9    4.20-6.30   12.0-18.0   37.0-51.0   80-97      140-440  
Adult F   4.1-10.9    4.04-5.48   12.0-18.0   37.0-51.0   80-97      140-440  0
-1 Yr    5.0-20.0    3.9-5.9     15-18       MV: 44      MV: 91     MV: 277  2-9
 Yr.   6.0-17.0    3.8-5.4     11-13       MV: 37      MV: 78     MV: 300  10 
Yrs.   5.0-13.0    3.8-5.4     12-15       MV: 39      MV: 80     MV: 250    
NOTE:  * FOR ADULT BLACK MALES AND FEMALES, NORMAL WBC IS 2.9-7.7 K/ML  * FOR 
ADULT BLACK MALES AND FEMALES, NORMAL RBC,HGB, AND HCT IS 5% LESS  SOURCE FOR 
DATA: [employer] 1800 OPERATION MANUAL( AUTOMATED BLOOD COUNTS AND DIFF.)  APPENDIX
  B-3                      CHRONIC KIDNEY DISEASE STAGING PER NKF:   MALE GFR 
INTERPRETATION:  20-49 YRS:     >60 mL/min  Normal 50-59 YRS:     >56 mL/min  
Normal 60-69 YRS:     >49 mL/min  Normal 70-79 YRS:     >42 mL/min  Normal 80 
and above  >35 mL/min  Normal   FEMALE GRF INTERPRETATION:  20-39 YRS:    >60 
mL/min  Normal 40-49 YRS:    >58 mL/min  Normal 50-59 YRS:    >51 mL/min  Normal
 60-69 YRS:    >45 mL/min  Normal 70-79 YRS:    >39 mL/min  Normal 80 and above 
>32 mL/min  Normal 

 

          eGFR Non-Afr. American 61 #                                    [facility] 

([facility]  

 

                                        NORMAL RANGES 

****************************************************************************** 
Age         WBC      RBC        HGB           HCT         MCV        PLT 
------------------------------------------------------------------------------ 
Adult M   4.1-10.9    4.20-6.30   12.0-18.0   37.0-51.0   80-97      140-440  
Adult F   4.1-10.9    4.04-5.48   12.0-18.0   37.0-51.0   80-97      140-440  0
-1 Yr    5.0-20.0    3.9-5.9     15-18       MV: 44      MV: 91     MV: 277  2-9
 Yr.   6.0-17.0    3.8-5.4     11-13       MV: 37      MV: 78     MV: 300  10 
Yrs.   5.0-13.0    3.8-5.4     12-15       MV: 39      MV: 80     MV: 250    
NOTE:  * FOR ADULT BLACK MALES AND FEMALES, NORMAL WBC IS 2.9-7.7 K/ML  * FOR 
ADULT BLACK MALES AND FEMALES, NORMAL RBC,HGB, AND HCT IS 5% LESS  SOURCE FOR 
DATA: [employer] 1800 OPERATION MANUAL( AUTOMATED BLOOD COUNTS AND DIFF.)  APPENDIX
  B-3                      CHRONIC KIDNEY DISEASE STAGING PER NKF:   MALE GFR 
INTERPRETATION:  20-49 YRS:     >60 mL/min  Normal 50-59 YRS:     >56 mL/min  
Normal 60-69 YRS:     >49 mL/min  Normal 70-79 YRS:     >42 mL/min  Normal 80 
and above  >35 mL/min  Normal   FEMALE GRF INTERPRETATION:  20-39 YRS:    >60 
mL/min  Normal 40-49 YRS:    >58 mL/min  Normal 50-59 YRS:    >51 mL/min  Normal
 60-69 YRS:    >45 mL/min  Normal 70-79 YRS:    >39 mL/min  Normal 80 and above 
>32 mL/min  Normal 

 

          Anion Gap 17 mmol/L                               [facility]  

 

                                        NORMAL RANGES 

****************************************************************************** 
Age         WBC      RBC        HGB           HCT         MCV        PLT 
------------------------------------------------------------------------------ 
Adult M   4.1-10.9    4.20-6.30   12.0-18.0   37.0-51.0   80-97      140-440  
Adult F   4.1-10.9    4.04-5.48   12.0-18.0   37.0-51.0   80-97      140-440  0
-1 Yr    5.0-20.0    3.9-5.9     15-18       MV: 44      MV: 91     MV: 277  2-9
 Yr.   6.0-17.0    3.8-5.4     11-13       MV: 37      MV: 78     MV: 300  10 
Yrs.   5.0-13.0    3.8-5.4     12-15       MV: 39      MV: 80     MV: 250    
NOTE:  * FOR ADULT BLACK MALES AND FEMALES, NORMAL WBC IS 2.9-7.7 K/ML  * FOR 
ADULT BLACK MALES AND FEMALES, NORMAL RBC,HGB, AND HCT IS 5% LESS  SOURCE FOR 
DATA: [employer] 1800 OPERATION MANUAL( AUTOMATED BLOOD COUNTS AND DIFF.)  APPENDIX
  B-3                      CHRONIC KIDNEY DISEASE STAGING PER NKF:   MALE GFR 
INTERPRETATION:  20-49 YRS:     >60 mL/min  Normal 50-59 YRS:     >56 mL/min  
Normal 60-69 YRS:     >49 mL/min  Normal 70-79 YRS:     >42 mL/min  Normal 80 
and above  >35 mL/min  Normal   FEMALE GRF INTERPRETATION:  20-39 YRS:    >60 
mL/min  Normal 40-49 YRS:    >58 mL/min  Normal 50-59 YRS:    >51 mL/min  Normal
 60-69 YRS:    >45 mL/min  Normal 70-79 YRS:    >39 mL/min  Normal 80 and above 
>32 mL/min  Normal 

 

          eGFR  71 #                                    [facility] (

[facility]  

 

                                        NORMAL RANGES 

****************************************************************************** 
Age         WBC      RBC        HGB           HCT         MCV        PLT 
------------------------------------------------------------------------------ 
Adult M   4.1-10.9    4.20-6.30   12.0-18.0   37.0-51.0   80-97      140-440  
Adult F   4.1-10.9    4.04-5.48   12.0-18.0   37.0-51.0   80-97      140-440  0
-1 Yr    5.0-20.0    3.9-5.9     15-18       MV: 44      MV: 91     MV: 277  2-9
 Yr.   6.0-17.0    3.8-5.4     11-13       MV: 37      MV: 78     MV: 300  10 
Yrs.   5.0-13.0    3.8-5.4     12-15       MV: 39      MV: 80     MV: 250    
NOTE:  * FOR ADULT BLACK MALES AND FEMALES, NORMAL WBC IS 2.9-7.7 K/ML  * FOR 
ADULT BLACK MALES AND FEMALES, NORMAL RBC,HGB, AND HCT IS 5% LESS  SOURCE FOR 
DATA: [employer] 1800 OPERATION MANUAL( AUTOMATED BLOOD COUNTS AND DIFF.)  APPENDIX
  B-3                      CHRONIC KIDNEY DISEASE STAGING PER NKF:   MALE GFR 
INTERPRETATION:  20-49 YRS:     >60 mL/min  Normal 50-59 YRS:     >56 mL/min  
Normal 60-69 YRS:     >49 mL/min  Normal 70-79 YRS:     >42 mL/min  Normal 80 
and above  >35 mL/min  Normal   FEMALE GRF INTERPRETATION:  20-39 YRS:    >60 
mL/min  Normal 40-49 YRS:    >58 mL/min  Normal 50-59 YRS:    >51 mL/min  Normal
 60-69 YRS:    >45 mL/min  Normal 70-79 YRS:    >39 mL/min  Normal 80 and above 
>32 mL/min  Normal 









                    ID                  Date                Data Source

 

                    [id number]         2020 01:59:00 PM EDT [facility] ([facility]

[facility]









          Name      Value     Range     Interpretation Code Description Data Khloe

rce(s) Supporting 

Document(s)

 

          RBC       5.12 10E6/uL                      [facility] ([facility]

[facility]  

 

                                        NORMAL RANGES 

****************************************************************************** 
Age         WBC      RBC        HGB           HCT         MCV        PLT 
------------------------------------------------------------------------------ 
Adult M   4.1-10.9    4.20-6.30   12.0-18.0   37.0-51.0   80-97      140-440  
Adult F   4.1-10.9    4.04-5.48   12.0-18.0   37.0-51.0   80-97      140-440  0
-1 Yr    5.0-20.0    3.9-5.9     15-18       MV: 44      MV: 91     MV: 277  2-9
 Yr.   6.0-17.0    3.8-5.4     11-13       MV: 37      MV: 78     MV: 300  10 
Yrs.   5.0-13.0    3.8-5.4     12-15       MV: 39      MV: 80     MV: 250    
NOTE:  * FOR ADULT BLACK MALES AND FEMALES, NORMAL WBC IS 2.9-7.7 K/ML  * FOR 
ADULT BLACK MALES AND FEMALES, NORMAL RBC,HGB, AND HCT IS 5% LESS  SOURCE FOR 
DATA: [employer] 1800 OPERATION MANUAL( AUTOMATED BLOOD COUNTS AND DIFF.)  APPENDIX
  B-3                      CHRONIC KIDNEY DISEASE STAGING PER NKF:   MALE GFR 
INTERPRETATION:  20-49 YRS:     >60 mL/min  Normal 50-59 YRS:     >56 mL/min  
Normal 60-69 YRS:     >49 mL/min  Normal 70-79 YRS:     >42 mL/min  Normal 80 
and above  >35 mL/min  Normal   FEMALE GRF INTERPRETATION:  20-39 YRS:    >60 
mL/min  Normal 40-49 YRS:    >58 mL/min  Normal 50-59 YRS:    >51 mL/min  Normal
 60-69 YRS:    >45 mL/min  Normal 70-79 YRS:    >39 mL/min  Normal 80 and above 
>32 mL/min  Normal 

 

          WBC       7.9 10E3/uL 4.1-10.9                      [employer] ([facility]  

 

                                        NORMAL RANGES 

****************************************************************************** 
Age         WBC      RBC        HGB           HCT         MCV        PLT 
------------------------------------------------------------------------------ 
Adult M   4.1-10.9    4.20-6.30   12.0-18.0   37.0-51.0   80-97      140-440  
Adult F   4.1-10.9    4.04-5.48   12.0-18.0   37.0-51.0   80-97      140-440  0
-1 Yr    5.0-20.0    3.9-5.9     15-18       MV: 44      MV: 91     MV: 277  2-9
 Yr.   6.0-17.0    3.8-5.4     11-13       MV: 37      MV: 78     MV: 300  10 
Yrs.   5.0-13.0    3.8-5.4     12-15       MV: 39      MV: 80     MV: 250    
NOTE:  * FOR ADULT BLACK MALES AND FEMALES, NORMAL WBC IS 2.9-7.7 K/ML  * FOR 
ADULT BLACK MALES AND FEMALES, NORMAL RBC,HGB, AND HCT IS 5% LESS  SOURCE FOR 
DATA: [employer] 1800 OPERATION MANUAL( AUTOMATED BLOOD COUNTS AND DIFF.)  APPENDIX
  B-3                      CHRONIC KIDNEY DISEASE STAGING PER NKF:   MALE GFR 
INTERPRETATION:  20-49 YRS:     >60 mL/min  Normal 50-59 YRS:     >56 mL/min  
Normal 60-69 YRS:     >49 mL/min  Normal 70-79 YRS:     >42 mL/min  Normal 80 
and above  >35 mL/min  Normal   FEMALE GRF INTERPRETATION:  20-39 YRS:    >60 
mL/min  Normal 40-49 YRS:    >58 mL/min  Normal 50-59 YRS:    >51 mL/min  Normal
 60-69 YRS:    >45 mL/min  Normal 70-79 YRS:    >39 mL/min  Normal 80 and above 
>32 mL/min  Normal 

 

          HGB       15.9 g/dL 12.0-18.0                     [facility] ([facility]

[facility]  

 

                                        NORMAL RANGES 

****************************************************************************** 
Age         WBC      RBC        HGB           HCT         MCV        PLT 
------------------------------------------------------------------------------ 
Adult M   4.1-10.9    4.20-6.30   12.0-18.0   37.0-51.0   80-97      140-440  
Adult F   4.1-10.9    4.04-5.48   12.0-18.0   37.0-51.0   80-97      140-440  0
-1 Yr    5.0-20.0    3.9-5.9     15-18       MV: 44      MV: 91     MV: 277  2-9
 Yr.   6.0-17.0    3.8-5.4     11-13       MV: 37      MV: 78     MV: 300  10 
Yrs.   5.0-13.0    3.8-5.4     12-15       MV: 39      MV: 80     MV: 250    
NOTE:  * FOR ADULT BLACK MALES AND FEMALES, NORMAL WBC IS 2.9-7.7 K/ML  * FOR 
ADULT BLACK MALES AND FEMALES, NORMAL RBC,HGB, AND HCT IS 5% LESS  SOURCE FOR 
DATA: [employer] 1800 OPERATION MANUAL( AUTOMATED BLOOD COUNTS AND DIFF.)  APPENDIX
  B-3                      CHRONIC KIDNEY DISEASE STAGING PER NKF:   MALE GFR 
INTERPRETATION:  20-49 YRS:     >60 mL/min  Normal 50-59 YRS:     >56 mL/min  
Normal 60-69 YRS:     >49 mL/min  Normal 70-79 YRS:     >42 mL/min  Normal 80 
and above  >35 mL/min  Normal   FEMALE GRF INTERPRETATION:  20-39 YRS:    >60 
mL/min  Normal 40-49 YRS:    >58 mL/min  Normal 50-59 YRS:    >51 mL/min  Normal
 60-69 YRS:    >45 mL/min  Normal 70-79 YRS:    >39 mL/min  Normal 80 and above 
>32 mL/min  Normal 

 

          MCV       90.0 fL   80.0-97.0                     [facility] ([facility]

[facility]  

 

                                        NORMAL RANGES 

****************************************************************************** 
Age         WBC      RBC        HGB           HCT         MCV        PLT 
------------------------------------------------------------------------------ 
Adult M   4.1-10.9    4.20-6.30   12.0-18.0   37.0-51.0   80-97      140-440  
Adult F   4.1-10.9    4.04-5.48   12.0-18.0   37.0-51.0   80-97      140-440  0
-1 Yr    5.0-20.0    3.9-5.9     15-18       MV: 44      MV: 91     MV: 277  2-9
 Yr.   6.0-17.0    3.8-5.4     11-13       MV: 37      MV: 78     MV: 300  10 
Yrs.   5.0-13.0    3.8-5.4     12-15       MV: 39      MV: 80     MV: 250    
NOTE:  * FOR ADULT BLACK MALES AND FEMALES, NORMAL WBC IS 2.9-7.7 K/ML  * FOR 
ADULT BLACK MALES AND FEMALES, NORMAL RBC,HGB, AND HCT IS 5% LESS  SOURCE FOR 
DATA: [employer] 1800 OPERATION MANUAL( AUTOMATED BLOOD COUNTS AND DIFF.)  APPENDIX
  B-3                      CHRONIC KIDNEY DISEASE STAGING PER NKF:   MALE GFR 
INTERPRETATION:  20-49 YRS:     >60 mL/min  Normal 50-59 YRS:     >56 mL/min  
Normal 60-69 YRS:     >49 mL/min  Normal 70-79 YRS:     >42 mL/min  Normal 80 
and above  >35 mL/min  Normal   FEMALE GRF INTERPRETATION:  20-39 YRS:    >60 
mL/min  Normal 40-49 YRS:    >58 mL/min  Normal 50-59 YRS:    >51 mL/min  Normal
 60-69 YRS:    >45 mL/min  Normal 70-79 YRS:    >39 mL/min  Normal 80 and above 
>32 mL/min  Normal 

 

          HCT       46.1 %    37.0-51.0                     [facility] ([facility]  

 

                                        NORMAL RANGES 

****************************************************************************** 
Age         WBC      RBC        HGB           HCT         MCV        PLT 
------------------------------------------------------------------------------ 
Adult M   4.1-10.9    4.20-6.30   12.0-18.0   37.0-51.0   80-97      140-440  
Adult F   4.1-10.9    4.04-5.48   12.0-18.0   37.0-51.0   80-97      140-440  0
-1 Yr    5.0-20.0    3.9-5.9     15-18       MV: 44      MV: 91     MV: 277  2-9
 Yr.   6.0-17.0    3.8-5.4     11-13       MV: 37      MV: 78     MV: 300  10 
Yrs.   5.0-13.0    3.8-5.4     12-15       MV: 39      MV: 80     MV: 250    
NOTE:  * FOR ADULT BLACK MALES AND FEMALES, NORMAL WBC IS 2.9-7.7 K/ML  * FOR 
ADULT BLACK MALES AND FEMALES, NORMAL RBC,HGB, AND HCT IS 5% LESS  SOURCE FOR 
DATA: [employer] 1800 OPERATION MANUAL( AUTOMATED BLOOD COUNTS AND DIFF.)  APPENDIX
  B-3                      CHRONIC KIDNEY DISEASE STAGING PER NKF:   MALE GFR 
INTERPRETATION:  20-49 YRS:     >60 mL/min  Normal 50-59 YRS:     >56 mL/min  
Normal 60-69 YRS:     >49 mL/min  Normal 70-79 YRS:     >42 mL/min  Normal 80 
and above  >35 mL/min  Normal   FEMALE GRF INTERPRETATION:  20-39 YRS:    >60 
mL/min  Normal 40-49 YRS:    >58 mL/min  Normal 50-59 YRS:    >51 mL/min  Normal
 60-69 YRS:    >45 mL/min  Normal 70-79 YRS:    >39 mL/min  Normal 80 and above 
>32 mL/min  Normal 

 

          PLT       186 10E3/uL 140-440                       [facility] ([facility]  

 

                                        NORMAL RANGES 

****************************************************************************** 
Age         WBC      RBC        HGB           HCT         MCV        PLT 
------------------------------------------------------------------------------ 
Adult M   4.1-10.9    4.20-6.30   12.0-18.0   37.0-51.0   80-97      140-440  
Adult F   4.1-10.9    4.04-5.48   12.0-18.0   37.0-51.0   80-97      140-440  0
-1 Yr    5.0-20.0    3.9-5.9     15-18       MV: 44      MV: 91     MV: 277  2-9
 Yr.   6.0-17.0    3.8-5.4     11-13       MV: 37      MV: 78     MV: 300  10 
Yrs.   5.0-13.0    3.8-5.4     12-15       MV: 39      MV: 80     MV: 250    
NOTE:  * FOR ADULT BLACK MALES AND FEMALES, NORMAL WBC IS 2.9-7.7 K/ML  * FOR 
ADULT BLACK MALES AND FEMALES, NORMAL RBC,HGB, AND HCT IS 5% LESS  SOURCE FOR 
DATA: [employer] 1800 OPERATION MANUAL( AUTOMATED BLOOD COUNTS AND DIFF.)  APPENDIX
  B-3                      CHRONIC KIDNEY DISEASE STAGING PER NKF:   MALE GFR 
INTERPRETATION:  20-49 YRS:     >60 mL/min  Normal 50-59 YRS:     >56 mL/min  
Normal 60-69 YRS:     >49 mL/min  Normal 70-79 YRS:     >42 mL/min  Normal 80 
and above  >35 mL/min  Normal   FEMALE GRF INTERPRETATION:  20-39 YRS:    >60 
mL/min  Normal 40-49 YRS:    >58 mL/min  Normal 50-59 YRS:    >51 mL/min  Normal
 60-69 YRS:    >45 mL/min  Normal 70-79 YRS:    >39 mL/min  Normal 80 and above 
>32 mL/min  Normal 

 

          MCHC      34.5 g/dL 31.0-36.0                     [facility] ([facility]

[facility]  

 

                                        NORMAL RANGES 

****************************************************************************** 
Age         WBC      RBC        HGB           HCT         MCV        PLT 
------------------------------------------------------------------------------ 
Adult M   4.1-10.9    4.20-6.30   12.0-18.0   37.0-51.0   80-97      140-440  
Adult F   4.1-10.9    4.04-5.48   12.0-18.0   37.0-51.0   80-97      140-440  0
-1 Yr    5.0-20.0    3.9-5.9     15-18       MV: 44      MV: 91     MV: 277  2-9
 Yr.   6.0-17.0    3.8-5.4     11-13       MV: 37      MV: 78     MV: 300  10 
Yrs.   5.0-13.0    3.8-5.4     12-15       MV: 39      MV: 80     MV: 250    
NOTE:  * FOR ADULT BLACK MALES AND FEMALES, NORMAL WBC IS 2.9-7.7 K/ML  * FOR 
ADULT BLACK MALES AND FEMALES, NORMAL RBC,HGB, AND HCT IS 5% LESS  SOURCE FOR 
DATA: [employer] 1800 OPERATION MANUAL( AUTOMATED BLOOD COUNTS AND DIFF.)  APPENDIX
  B-3                      CHRONIC KIDNEY DISEASE STAGING PER NKF:   MALE GFR 
INTERPRETATION:  20-49 YRS:     >60 mL/min  Normal 50-59 YRS:     >56 mL/min  
Normal 60-69 YRS:     >49 mL/min  Normal 70-79 YRS:     >42 mL/min  Normal 80 
and above  >35 mL/min  Normal   FEMALE GRF INTERPRETATION:  20-39 YRS:    >60 
mL/min  Normal 40-49 YRS:    >58 mL/min  Normal 50-59 YRS:    >51 mL/min  Normal
 60-69 YRS:    >45 mL/min  Normal 70-79 YRS:    >39 mL/min  Normal 80 and above 
>32 mL/min  Normal 

 

          MCH       31.1 pg   26.0-32.0                     [facility] ([facility]  

 

                                        NORMAL RANGES 

****************************************************************************** 
Age         WBC      RBC        HGB           HCT         MCV        PLT 
------------------------------------------------------------------------------ 
Adult M   4.1-10.9    4.20-6.30   12.0-18.0   37.0-51.0   80-97      140-440  
Adult F   4.1-10.9    4.04-5.48   12.0-18.0   37.0-51.0   80-97      140-440  0
-1 Yr    5.0-20.0    3.9-5.9     15-18       MV: 44      MV: 91     MV: 277  2-9
 Yr.   6.0-17.0    3.8-5.4     11-13       MV: 37      MV: 78     MV: 300  10 
Yrs.   5.0-13.0    3.8-5.4     12-15       MV: 39      MV: 80     MV: 250    
NOTE:  * FOR ADULT BLACK MALES AND FEMALES, NORMAL WBC IS 2.9-7.7 K/ML  * FOR 
ADULT BLACK MALES AND FEMALES, NORMAL RBC,HGB, AND HCT IS 5% LESS  SOURCE FOR 
DATA: [employer] 1800 OPERATION MANUAL( AUTOMATED BLOOD COUNTS AND DIFF.)  APPENDIX
  B-3                      CHRONIC KIDNEY DISEASE STAGING PER NKF:   MALE GFR 
INTERPRETATION:  20-49 YRS:     >60 mL/min  Normal 50-59 YRS:     >56 mL/min  
Normal 60-69 YRS:     >49 mL/min  Normal 70-79 YRS:     >42 mL/min  Normal 80 
and above  >35 mL/min  Normal   FEMALE GRF INTERPRETATION:  20-39 YRS:    >60 
mL/min  Normal 40-49 YRS:    >58 mL/min  Normal 50-59 YRS:    >51 mL/min  Normal
 60-69 YRS:    >45 mL/min  Normal 70-79 YRS:    >39 mL/min  Normal 80 and above 
>32 mL/min  Normal 

 

          Lym%      22.0 %    10.0-58.5                     [facility]

[facility]  

 

                                        NORMAL RANGES 

****************************************************************************** 
Age         WBC      RBC        HGB           HCT         MCV        PLT 
------------------------------------------------------------------------------ 
Adult M   4.1-10.9    4.20-6.30   12.0-18.0   37.0-51.0   80-97      140-440  
Adult F   4.1-10.9    4.04-5.48   12.0-18.0   37.0-51.0   80-97      140-440  0
-1 Yr    5.0-20.0    3.9-5.9     15-18       MV: 44      MV: 91     MV: 277  2-9
 Yr.   6.0-17.0    3.8-5.4     11-13       MV: 37      MV: 78     MV: 300  10 
Yrs.   5.0-13.0    3.8-5.4     12-15       MV: 39      MV: 80     MV: 250    
NOTE:  * FOR ADULT BLACK MALES AND FEMALES, NORMAL WBC IS 2.9-7.7 K/ML  * FOR 
ADULT BLACK MALES AND FEMALES, NORMAL RBC,HGB, AND HCT IS 5% LESS  SOURCE FOR 
DATA: [employer] 1800 OPERATION MANUAL( AUTOMATED BLOOD COUNTS AND DIFF.)  APPENDIX
  B-3                      CHRONIC KIDNEY DISEASE STAGING PER NKF:   MALE GFR 
INTERPRETATION:  20-49 YRS:     >60 mL/min  Normal 50-59 YRS:     >56 mL/min  
Normal 60-69 YRS:     >49 mL/min  Normal 70-79 YRS:     >42 mL/min  Normal 80 
and above  >35 mL/min  Normal   FEMALE GRF INTERPRETATION:  20-39 YRS:    >60 
mL/min  Normal 40-49 YRS:    >58 mL/min  Normal 50-59 YRS:    >51 mL/min  Normal
 60-69 YRS:    >45 mL/min  Normal 70-79 YRS:    >39 mL/min  Normal 80 and above 
>32 mL/min  Normal 

 

          RDW-CV    14.2 %    11.5-14.5                     [facility]

[facility]  

 

                                        NORMAL RANGES 

****************************************************************************** 
Age         WBC      RBC        HGB           HCT         MCV        PLT 
------------------------------------------------------------------------------ 
Adult M   4.1-10.9    4.20-6.30   12.0-18.0   37.0-51.0   80-97      140-440  
Adult F   4.1-10.9    4.04-5.48   12.0-18.0   37.0-51.0   80-97      140-440  0
-1 Yr    5.0-20.0    3.9-5.9     15-18       MV: 44      MV: 91     MV: 277  2-9
 Yr.   6.0-17.0    3.8-5.4     11-13       MV: 37      MV: 78     MV: 300  10 
Yrs.   5.0-13.0    3.8-5.4     12-15       MV: 39      MV: 80     MV: 250    
NOTE:  * FOR ADULT BLACK MALES AND FEMALES, NORMAL WBC IS 2.9-7.7 K/ML  * FOR 
ADULT BLACK MALES AND FEMALES, NORMAL RBC,HGB, AND HCT IS 5% LESS  SOURCE FOR 
DATA: [employer] 1800 OPERATION MANUAL( AUTOMATED BLOOD COUNTS AND DIFF.)  APPENDIX
  B-3                      CHRONIC KIDNEY DISEASE STAGING PER NKF:   MALE GFR 
INTERPRETATION:  20-49 YRS:     >60 mL/min  Normal 50-59 YRS:     >56 mL/min  
Normal 60-69 YRS:     >49 mL/min  Normal 70-79 YRS:     >42 mL/min  Normal 80 
and above  >35 mL/min  Normal   FEMALE GRF INTERPRETATION:  20-39 YRS:    >60 
mL/min  Normal 40-49 YRS:    >58 mL/min  Normal 50-59 YRS:    >51 mL/min  Normal
 60-69 YRS:    >45 mL/min  Normal 70-79 YRS:    >39 mL/min  Normal 80 and above 
>32 mL/min  Normal 

 

          Lym#      1.7 10E3/uL 0.6-4.1                       [employer] ([facility]  

 

                                        NORMAL RANGES 

****************************************************************************** 
Age         WBC      RBC        HGB           HCT         MCV        PLT 
------------------------------------------------------------------------------ 
Adult M   4.1-10.9    4.20-6.30   12.0-18.0   37.0-51.0   80-97      140-440  
Adult F   4.1-10.9    4.04-5.48   12.0-18.0   37.0-51.0   80-97      140-440  0
-1 Yr    5.0-20.0    3.9-5.9     15-18       MV: 44      MV: 91     MV: 277  2-9
 Yr.   6.0-17.0    3.8-5.4     11-13       MV: 37      MV: 78     MV: 300  10 
Yrs.   5.0-13.0    3.8-5.4     12-15       MV: 39      MV: 80     MV: 250    
NOTE:  * FOR ADULT BLACK MALES AND FEMALES, NORMAL WBC IS 2.9-7.7 K/ML  * FOR 
ADULT BLACK MALES AND FEMALES, NORMAL RBC,HGB, AND HCT IS 5% LESS  SOURCE FOR 
DATA: [employer] OPERATION MANUAL( AUTOMATED BLOOD COUNTS AND DIFF.)  APPENDIX
  B-3                      CHRONIC KIDNEY DISEASE STAGING PER NKF:   MALE GFR 
INTERPRETATION:  20-49 YRS:     >60 mL/min  Normal 50-59 YRS:     >56 mL/min  
Normal 60-69 YRS:     >49 mL/min  Normal 70-79 YRS:     >42 mL/min  Normal 80 
and above  >35 mL/min  Normal   FEMALE GRF INTERPRETATION:  20-39 YRS:    >60 
mL/min  Normal 40-49 YRS:    >58 mL/min  Normal 50-59 YRS:    >51 mL/min  Normal
 60-69 YRS:    >45 mL/min  Normal 70-79 YRS:    >39 mL/min  Normal 80 and above 
>32 mL/min  Normal 

 

          MXD%      5.5 %     0.1-24.0                      [facility]

[facility]  

 

                                        NORMAL RANGES 

****************************************************************************** 
Age         WBC      RBC        HGB           HCT         MCV        PLT 
------------------------------------------------------------------------------ 
Adult M   4.1-10.9    4.20-6.30   12.0-18.0   37.0-51.0   80-97      140-440  
Adult F   4.1-10.9    4.04-5.48   12.0-18.0   37.0-51.0   80-97      140-440  0
-1 Yr    5.0-20.0    3.9-5.9     15-18       MV: 44      MV: 91     MV: 277  2-9
 Yr.   6.0-17.0    3.8-5.4     11-13       MV: 37      MV: 78     MV: 300  10 
Yrs.   5.0-13.0    3.8-5.4     12-15       MV: 39      MV: 80     MV: 250    
NOTE:  * FOR ADULT BLACK MALES AND FEMALES, NORMAL WBC IS 2.9-7.7 K/ML  * FOR 
ADULT BLACK MALES AND FEMALES, NORMAL RBC,HGB, AND HCT IS 5% LESS  SOURCE FOR 
DATA: [employer] 1800 OPERATION MANUAL( AUTOMATED BLOOD COUNTS AND DIFF.)  APPENDIX
  B-3                      CHRONIC KIDNEY DISEASE STAGING PER NKF:   MALE GFR 
INTERPRETATION:  20-49 YRS:     >60 mL/min  Normal 50-59 YRS:     >56 mL/min  
Normal 60-69 YRS:     >49 mL/min  Normal 70-79 YRS:     >42 mL/min  Normal 80 
and above  >35 mL/min  Normal   FEMALE GRF INTERPRETATION:  20-39 YRS:    >60 
mL/min  Normal 40-49 YRS:    >58 mL/min  Normal 50-59 YRS:    >51 mL/min  Normal
 60-69 YRS:    >45 mL/min  Normal 70-79 YRS:    >39 mL/min  Normal 80 and above 
>32 mL/min  Normal 

 

          Neut%     72.5 %    37.0-92.0                     [facility] ([facility]  

 

                                        NORMAL RANGES 

****************************************************************************** 
Age         WBC      RBC        HGB           HCT         MCV        PLT 
------------------------------------------------------------------------------ 
Adult M   4.1-10.9    4.20-6.30   12.0-18.0   37.0-51.0   80-97      140-440  
Adult F   4.1-10.9    4.04-5.48   12.0-18.0   37.0-51.0   80-97      140-440  0
-1 Yr    5.0-20.0    3.9-5.9     15-18       MV: 44      MV: 91     MV: 277  2-9
 Yr.   6.0-17.0    3.8-5.4     11-13       MV: 37      MV: 78     MV: 300  10 
Yrs.   5.0-13.0    3.8-5.4     12-15       MV: 39      MV: 80     MV: 250    
NOTE:  * FOR ADULT BLACK MALES AND FEMALES, NORMAL WBC IS 2.9-7.7 K/ML  * FOR 
ADULT BLACK MALES AND FEMALES, NORMAL RBC,HGB, AND HCT IS 5% LESS  SOURCE FOR 
DATA: [employer] 1800 OPERATION MANUAL( AUTOMATED BLOOD COUNTS AND DIFF.)  APPENDIX
  B-3                      CHRONIC KIDNEY DISEASE STAGING PER NKF:   MALE GFR 
INTERPRETATION:  20-49 YRS:     >60 mL/min  Normal 50-59 YRS:     >56 mL/min  
Normal 60-69 YRS:     >49 mL/min  Normal 70-79 YRS:     >42 mL/min  Normal 80 
and above  >35 mL/min  Normal   FEMALE GRF INTERPRETATION:  20-39 YRS:    >60 
mL/min  Normal 40-49 YRS:    >58 mL/min  Normal 50-59 YRS:    >51 mL/min  Normal
 60-69 YRS:    >45 mL/min  Normal 70-79 YRS:    >39 mL/min  Normal 80 and above 
>32 mL/min  Normal 

 

          MXD#      0.4 10E3/uL 0.0-1.8                       [facility] ([facility]  

 

                                        NORMAL RANGES 

****************************************************************************** 
Age         WBC      RBC        HGB           HCT         MCV        PLT 
------------------------------------------------------------------------------ 
Adult M   4.1-10.9    4.20-6.30   12.0-18.0   37.0-51.0   80-97      140-440  
Adult F   4.1-10.9    4.04-5.48   12.0-18.0   37.0-51.0   80-97      140-440  0
-1 Yr    5.0-20.0    3.9-5.9     15-18       MV: 44      MV: 91     MV: 277  2-9
 Yr.   6.0-17.0    3.8-5.4     11-13       MV: 37      MV: 78     MV: 300  10 
Yrs.   5.0-13.0    3.8-5.4     12-15       MV: 39      MV: 80     MV: 250    
NOTE:  * FOR ADULT BLACK MALES AND FEMALES, NORMAL WBC IS 2.9-7.7 K/ML  * FOR 
ADULT BLACK MALES AND FEMALES, NORMAL RBC,HGB, AND HCT IS 5% LESS  SOURCE FOR 
DATA: [employer] 1800 OPERATION MANUAL( AUTOMATED BLOOD COUNTS AND DIFF.)  APPENDIX
  B-3                      CHRONIC KIDNEY DISEASE STAGING PER NKF:   MALE GFR 
INTERPRETATION:  20-49 YRS:     >60 mL/min  Normal 50-59 YRS:     >56 mL/min  
Normal 60-69 YRS:     >49 mL/min  Normal 70-79 YRS:     >42 mL/min  Normal 80 
and above  >35 mL/min  Normal   FEMALE GRF INTERPRETATION:  20-39 YRS:    >60 
mL/min  Normal 40-49 YRS:    >58 mL/min  Normal 50-59 YRS:    >51 mL/min  Normal
 60-69 YRS:    >45 mL/min  Normal 70-79 YRS:    >39 mL/min  Normal 80 and above 
>32 mL/min  Normal 

 

          Neut#     5.8 %     2.0-7.8                       [facility] ([facility]  

 

                                        NORMAL RANGES 

****************************************************************************** 
Age         WBC      RBC        HGB           HCT         MCV        PLT 
------------------------------------------------------------------------------ 
Adult M   4.1-10.9    4.20-6.30   12.0-18.0   37.0-51.0   80-97      140-440  
Adult F   4.1-10.9    4.04-5.48   12.0-18.0   37.0-51.0   80-97      140-440  0
-1 Yr    5.0-20.0    3.9-5.9     15-18       MV: 44      MV: 91     MV: 277  2-9
 Yr.   6.0-17.0    3.8-5.4     11-13       MV: 37      MV: 78     MV: 300  10 
Yrs.   5.0-13.0    3.8-5.4     12-15       MV: 39      MV: 80     MV: 250    
NOTE:  * FOR ADULT BLACK MALES AND FEMALES, NORMAL WBC IS 2.9-7.7 K/ML  * FOR 
ADULT BLACK MALES AND FEMALES, NORMAL RBC,HGB, AND HCT IS 5% LESS  SOURCE FOR 
DATA: [employer] 1800 OPERATION MANUAL( AUTOMATED BLOOD COUNTS AND DIFF.)  APPENDIX
  B-3                      CHRONIC KIDNEY DISEASE STAGING PER NKF:   MALE GFR 
INTERPRETATION:  20-49 YRS:     >60 mL/min  Normal 50-59 YRS:     >56 mL/min  
Normal 60-69 YRS:     >49 mL/min  Normal 70-79 YRS:     >42 mL/min  Normal 80 
and above  >35 mL/min  Normal   FEMALE GRF INTERPRETATION:  20-39 YRS:    >60 
mL/min  Normal 40-49 YRS:    >58 mL/min  Normal 50-59 YRS:    >51 mL/min  Normal
 60-69 YRS:    >45 mL/min  Normal 70-79 YRS:    >39 mL/min  Normal 80 and above 
>32 mL/min  Normal 

 

          MPV       10.0 fL   9.0-13.0                      [facility] ([facility]  

 

                                        NORMAL RANGES 

****************************************************************************** 
Age         WBC      RBC        HGB           HCT         MCV        PLT 
------------------------------------------------------------------------------ 
Adult M   4.1-10.9    4.20-6.30   12.0-18.0   37.0-51.0   80-97      140-440  
Adult F   4.1-10.9    4.04-5.48   12.0-18.0   37.0-51.0   80-97      140-440  0
-1 Yr    5.0-20.0    3.9-5.9     15-18       MV: 44      MV: 91     MV: 277  2-9
 Yr.   6.0-17.0    3.8-5.4     11-13       MV: 37      MV: 78     MV: 300  10 
Yrs.   5.0-13.0    3.8-5.4     12-15       MV: 39      MV: 80     MV: 250    
NOTE:  * FOR ADULT BLACK MALES AND FEMALES, NORMAL WBC IS 2.9-7.7 K/ML  * FOR 
ADULT BLACK MALES AND FEMALES, NORMAL RBC,HGB, AND HCT IS 5% LESS  SOURCE FOR 
DATA: [employer] 1800 OPERATION MANUAL( AUTOMATED BLOOD COUNTS AND DIFF.)  APPENDIX
  B-3                      CHRONIC KIDNEY DISEASE STAGING PER NKF:   MALE GFR 
INTERPRETATION:  20-49 YRS:     >60 mL/min  Normal 50-59 YRS:     >56 mL/min  
Normal 60-69 YRS:     >49 mL/min  Normal 70-79 YRS:     >42 mL/min  Normal 80 
and above  >35 mL/min  Normal   FEMALE GRF INTERPRETATION:  20-39 YRS:    >60 
mL/min  Normal 40-49 YRS:    >58 mL/min  Normal 50-59 YRS:    >51 mL/min  Normal
 60-69 YRS:    >45 mL/min  Normal 70-79 YRS:    >39 mL/min  Normal 80 and above 
>32 mL/min  Normal 







                                        Procedure

 

                                          



                                                                                
                                                                                
                                                                                
                                                                                
                  



Social History

          



           Code       Duration   Value      Status     Description Data Source(s

)

 

                                2021 12:00:00 AM EDT Patient is a current 

smoker, smokes every day 

completed                 Patient is a current smoker, smokes every day [facility] (

[facility], )

 

           Smoking    2021 12:00:00 AM EDT Current Smoker completed  Curre

nt Smoker eCW1 

(Formerly [facility])

 

           Smoking    2021 12:00:00 AM EDT Current Smoker completed  Curre

nt Smoker eCW1 

(Formerly [facility], [facility])

 

           Smoking    2021 12:00:00 AM EDT Current Smoker completed  Curre

nt Smoker eCW1 

([facility]ly [facility], [facility])



                                                                                
                                                



Vital Signs

          



                    ID                  Date                Data Source

 

                    UNK                                      









           Name       Value      Range      Interpretation Code Description Data

 Source(s)

 

           Diastolic blood pressure 76 mm[Hg]                        76 mm[Hg]  

[facility] ([facility]

 

           Systolic blood pressure 122 mm[Hg]                       122 mm[Hg] M

[facility] ([facility]

 

           Body temperature 97.6 [degF]                       97.6 [degF] [facility]

 ([facility]

 

           Heart rate 85 /min                          85 /min    [facility] ([facility]

 

           Respiratory rate 16 /min                          16 /min    [facility] (

[facility]

 

           Body height 70 [in_i]                        70 [in_i]  [facility] ([facility]

 

                                        5'10" 

 

           Body weight 231.00 [lb_av]                       231.00 [lb_av] [facility]

T ([facility]

 

           Ideal body weight 166 [lb_av]                       166 [lb_av] [facility]

T ([facility]

 

           Body mass index (BMI) [Ratio] 33.1 kg/m2                       33.1 k

g/m2 [facility] ([facility]

 

           Oxygen saturation in Arterial blood by Pulse oximetry 97 %           

                  97 %       [facility] 

([facility]

 

           Body mass index (BMI) [Ratio] 34.6 kg/m2                       34.6 k

g/m2 [facility] ([facility]

 

           Ideal body weight 166 [lb_av]                       166 [lb_av] [facility]

T ([facility]

 

           Respiratory rate 16 /min                          16 /min    [facility] (

[facility]

 

           Systolic blood pressure 102 mm[Hg]                       102 mm[Hg] M

[facility] ([facility]

 

           Diastolic blood pressure 72 mm[Hg]                        72 mm[Hg]  

[facility] ([facility]

 

           Body temperature 97.6 [degF]                       97.6 [degF] [facility]

 ([facility]

 

           Heart rate 53 /min                          53 /min    [facility] ([facility]

 

           Body height 70 [in_i]                        70 [in_i]  [facility] ([facility]

 

                                        5'10" 

 

           Body weight 241.00 [lb_av]                       241.00 [lb_av] [facility]

T ([facility]

 

           Oxygen saturation in Arterial blood by Pulse oximetry 94 %           

                  94 %       [facility] 

([facility]

 

           Body surface area Derived from formula 2.24 m2                       

   2.24 m2    [facility] ([facility], )

 

           Systolic blood pressure 120 mm[Hg]                       120 mm[Hg] M

[facility] ([facility], )

 

           Diastolic blood pressure 78 mm[Hg]                        78 mm[Hg]  

[facility] ([facility])

 

           Heart rate 78 /min                          78 /min    [facility] ([facility])

 

           Oxygen saturation in Arterial blood by Pulse oximetry 98 %           

                  98 %       [facility] 

([facility])

 

                                        Room Air 

 

           Body height 70 [in_i]                        70 [in_i]  [facility] ([facility])

 

                                        5'10" 

 

           Body weight 237.00 [lb_av]                       237.00 [lb_av] [facility]

T ([facility], )

 

           Body mass index (BMI) [Ratio] 34.0 kg/m2                       34.0 k

g/m2 [facility] ([facility])

 

           Ideal body weight 166 [lb_av]                       166 [lb_av] [facility]

T ([facility])

 

           Body weight 107.503 kg                       107.503 kg [facility] ([facility])

 

           Body weight 244 [lb_av]                       244 [lb_av] W1 ([facility])

 

           Body height                                   [in_i]    eCW1 ([facility])

 

           Body mass index (BMI) [Ratio] 35.01 kg/m2                       35.01

 kg/m2 eCW1 ([facility])

 

           Heart rate 79 /min                          79 /min    eCW1 ([facility])

 

           Respiratory rate 18 /min                          18 /min    eCW1 ([facility])

 

           Body temperature 98.0 [degF]                       98.0 [degF] eCW1 (

[facility]ly [facility])

 

           Systolic blood pressure 128 mm[Hg]                       128 mm[Hg] e

CW1 ([facility])

 

           Diastolic blood pressure 76 mm[Hg]                        76 mm[Hg]  

eCW1 ([facility])

 

           Body weight 250 [lb_av]                       250 [lb_av] eCW1 ([facility])

 

           Body height                                   [in_i]    eCW1 ([facility])

 

           Body mass index (BMI) [Ratio] 35.87 kg/m2                       35.87

 kg/m2 eCW1 ([facility])

 

           Heart rate 70 /min                          70 /min    eCW1 ([facility])

 

           Respiratory rate 18 /min                          18 /min    eCW1 ([facility])

 

           Body temperature 97.7 [degF]                       97.7 [degF] eCW1 (

[facility])

 

           Systolic blood pressure 120 mm[Hg]                       120 mm[Hg] e

CW1 ([facility])

 

           Diastolic blood pressure 74 mm[Hg]                        74 mm[Hg]  

eCW1 ([facility])

 

           Systolic blood pressure 112 mm[Hg]                       112 mm[Hg] M

[facility] ([facility]

 

           Heart rate 79 /min                          79 /min    [facility] ([facility]

 

           Diastolic blood pressure 72 mm[Hg]                        72 mm[Hg]  

[facility] ([facility]

 

           Ideal body weight 166 [lb_av]                       166 [lb_av] [facility]

T ([facility]

 

           Body temperature 97.3 [degF]                       97.3 [degF] [facility]

 ([facility]

 

           Respiratory rate 16 /min                          16 /min    [facility] (

[facility]

 

           Body height 70 [in_i]                        70 [in_i]  [facility] ([facility]

 

                                        5'10" 

 

           Body weight 252.00 [lb_av]                       252.00 [lb_av] [facility]

T ([facility]

 

           Body mass index (BMI) [Ratio] 36.2 kg/m2                       36.2 k

g/m2 [facility] ([facility]

 

           Oxygen saturation in Arterial blood by Pulse oximetry 96 %           

                  96 %       [facility] 

([facility]

 

           Body mass index (BMI) [Ratio] 36.1 kg/m2                       36.1 k

g/m2 [facility] ([facility])

 

           Body height 70 [in_i]                        70 [in_i]  [facility] ([facility])

 

                                        5'10" 

 

           Systolic blood pressure 118 mm[Hg]                       118 mm[Hg] M

EDENT ([facility])

 

           Diastolic blood pressure 67 mm[Hg]                        67 mm[Hg]  

[facility] ([facility])

 

           Heart rate 77 /min                          77 /min    [facility] ([facility])

 

           Body weight 251.50 [lb_av]                       251.50 [lb_av] [facility]

T ([facility])

 

           Ideal body weight 166 [lb_av]                       166 [lb_av] [facility]

T ([facility])

 

           Body weight 114.080 kg                       114.080 kg [facility] ([facility])

 

           Body surface area Derived from formula 2.30 m2                       

   2.30 m2    [facility] ([facility])

 

           Ideal body weight 166 [lb_av]                       166 [lb_av] [facility]

T ([facility]

 

           Body mass index (BMI) [Ratio] 34.9 kg/m2                       34.9 k

g/m2 [facility] ([facility]

 

           Oxygen saturation in Arterial blood by Pulse oximetry 96 %           

                  96 %       [facility] 

([facility]

 

           Systolic blood pressure 128 mm[Hg]                       128 mm[Hg] M

EDENT ([facility]

 

           Diastolic blood pressure 72 mm[Hg]                        72 mm[Hg]  

[facility] ([facility]

 

           Body temperature 98.2 [degF]                       98.2 [degF] [facility]

 ([facility]

 

           Heart rate 71 /min                          71 /min    [facility] ([facility]

 

           Respiratory rate 16 /min                          16 /min    [facility] (

[facility]

 

           Body height 70 [in_i]                        70 [in_i]  [facility] ([facility]

 

                                        5'10" 

 

           Body weight 243.00 [lb_av]                       243.00 [lb_av] [facility]

T ([facility]

 

           Systolic blood pressure 108 mm[Hg]                       108 mm[Hg] M

EDENT ([facility], )

 

           Diastolic blood pressure 70 mm[Hg]                        70 mm[Hg]  

[facility] ([facility])

 

           Heart rate 86 /min                          86 /min    [facility] ([facility])

 

           Oxygen saturation in Arterial blood by Pulse oximetry 96 %           

                  96 %       [facility] 

([facility])

 

                                        Room Air 

 

           Body height 70 [in_i]                        70 [in_i]  [facility] ([facility])

 

                                        5'10" 

 

           Body weight 242.00 [lb_av]                       242.00 [lb_av] [facility]

T ([facility])

 

           Body mass index (BMI) [Ratio] 34.7 kg/m2                       34.7 k

g/m2 [facility] ([facility])

 

           Ideal body weight 166 [lb_av]                       166 [lb_av] [facility]

T ([facility])

 

           Body weight 109.771 kg                       109.771 kg [facility] ([facility])

 

           Body surface area Derived from formula 2.26 m2                       

   2.26 m2    [facility] ([facility])

 

           Systolic blood pressure 115 mm[Hg]                       115 mm[Hg] M

EDENT ([facility])

 

           Diastolic blood pressure 75 mm[Hg]                        75 mm[Hg]  

[facility] ([facility])

 

           Body height 70 [in_i]                        70 [in_i]  [facility] ([facility])

 

                                        5'10" 

 

           Body weight 242.00 [lb_av]                       242.00 [lb_av] [facility]

T ([facility])

 

           Body mass index (BMI) [Ratio] 34.7 kg/m2                       34.7 k

g/m2 [facility] ([facility])

 

           Ideal body weight 166 [lb_av]                       166 [lb_av] [facility]

T ([facility])

 

           Body weight 109.771 kg                       109.771 kg [facility] ([facility])

 

           Body surface area Derived from formula 2.26 m2                       

   2.26 m2    [facility] ([facility])

 

           Systolic blood pressure 130 mm[Hg]                       130 mm[Hg] M

EDENT ([facility])

 

           Diastolic blood pressure 78 mm[Hg]                        78 mm[Hg]  

[facility] ([facility])

 

           Body height 70 [in_i]                        70 [in_i]  [facility] ([facility])

 

                                        5'10" 

 

           Body weight 245.00 [lb_av]                       245.00 [lb_av] [facility]

T ([facility])

 

           Body mass index (BMI) [Ratio] 35.1 kg/m2                       35.1 k

g/m2 [facility] ([facility])

 

           Ideal body weight 166 [lb_av]                       166 [lb_av] [facility]

T ([facility])

 

           Body weight 111.132 kg                       111.132 kg [facility] ([facility])

 

           Ideal body weight 166 [lb_av]                       166 [lb_av] [facility]

T ([facility]

 

           Body mass index (BMI) [Ratio] 34.9 kg/m2                       34.9 k

g/m2 [facility] ([facility]

 

           Oxygen saturation in Arterial blood by Pulse oximetry 96 %           

                  96 %       [facility] 

([facility]

 

           Systolic blood pressure 102 mm[Hg]                       102 mm[Hg] M

ED[facility] ([facility]

 

           Diastolic blood pressure 54 mm[Hg]                        54 mm[Hg]  

[facility] ([facility]

 

           Body temperature 97.6 [degF]                       97.6 [degF] [facility]

 ([facility]

 

           Heart rate 77 /min                          77 /min    [facility] ([facility]

 

           [facility] 16 /min                          16 /min    [facility]

 

           Body height 70 [in_i]                        70 [in_i]  [facility] ([facility]

 

                                        5'10" 

 

           Body weight 243.00 [lb_av]                       243.00 [lb_av] JUDE TAPIA ([facility]



                                                                                
                  



Patient Treatment Plan of Care

          



             Planned Activity Planned Date Details      Description  Data Source

(s)

 

             vardenafil 20 MG Oral Tablet 2021 12:00:00 AM EDT            

               eCW1 ([facility]ly [facility])

 

             vardenafil 20 MG Oral Tablet 2021 12:00:00 AM EDT            

               eCW1 ([facility])

 

             vardenafil 20 MG Oral Tablet 2021 12:00:00 AM EDT            

               eCW1 ([facility])

 

             vardenafil 20 MG Oral Tablet 2021 12:00:00 AM EDT            

               eCW1 ([facility])

## 2021-11-03 NOTE — REP
INDICATION:

abdominal pain.



COMPARISON:

CT 04/16/2021



TECHNIQUE:

Bolus 100 mL Isovue 370 scanning through the abdomen pelvis with both coronal and

sagittal reconstructions.



FINDINGS:

CT abdomen: Some mild chronic changes in the bases is again seen and stable without

acute finding.  The heart is not enlarged there is no pericardial thickening or

effusion.  I see no hiatal hernia.  There is no hepatosplenomegaly, focal hepatic or

splenic mass, intrahepatic biliary dilatation nor perihepatic ascites.  The

gallbladder shows a few calcified stones in the dependent fundus of the gallbladder,

unchanged.  Pancreas shows no mass, ductal dilatation, inflammatory changes in the

adjacent peripancreatic fat or adenopathy.  Adrenal glands are normal.  The kidneys

show bilateral cysts with size and number unchanged from the study 7 months ago.  No

renal solid mass, hydronephrosis or stone disease the right.  There is a

nonobstructing calcification appear mid lower pole, left, unchanged.  This is about 8

mm.  The abdominal portion of colon shows scattered stool and mostly fluid throughout

colonic loops.  No sign of colitis or diverticulitis in the abdominal colon.  The

small bowel loops in the abdomen proper are fluid-filled, some of them mildly dilated

and without air-fluid levels.  Evidence of prior abdominal wall hernia surgery in the

midline scar and/or mesh noted and diastasis of those rectus muscles some atrophy of

portions of the rectus muscles noted.  The aorta has atherosclerotic calcifications

without aneurysm there is no periaortic, other retroperitoneal or mesenteric

pathologic sized lymphadenopathy.  I see no inflammatory changes about the cecum.

Terminal ileum grossly intact.  There is no abnormal wall thickening but caliber of

the terminal ileum is less than that of the more proximal ileum.  No adjacent

inflammatory changes to the TI.  Some muscular atrophy of the right psoas muscle and

iliac muscles, right compared to left.  Lung window review of all CT slices shows no

perforation, free air or air bubbles to suggest abscess.  The bone windows show

marginal osteophytes and degenerative disc changes lower thoracic and lumbar spine

without compression fracture.  Posterior element facet arthropathy lower lumbar region.



CT pelvis: The bony pelvis shows the sacrum intact.  SI joints show sclerosis iliac

side right greater than left but without erosions.  Iliac wings without acute finding.

There are bilateral total hip arthroplasties again seen with spray artifact limiting

evaluation of the deep central pelvis.  Bladder partially filled but its inferior

margin and distal ureters cannot be evaluated.  Proximal ureters are unremarkable.  No

pelvic midline ventral hernia or definite inguinal hernia.  No pathologic sized

inguinal adenopathy or visible pelvic adenopathy.  Anastomotic suture line in the

distal left colon proximal sigmoid again seen.  Stool and gas throughout the distal

left colon to rectosigmoid.  Small bowel loops in the deep pelvis fluid-filled.

Subtle of decrease in caliber of the distal ileum and terminal ileum without wall

thickening or adjacent inflammatory change.



IMPRESSION:

1. Fluid-filled small bowel loops with some mildly distended and with  subtle

transition in the distal ileum and terminal ileum without  significant stricture, wall

thickening of the those segments or adjacent inflammatory changes in the mesenteric

fat.  Findings may reflect some gastroenteritis or ileus.  Appearance is not typical

for inflammatory bowel disease with normal wall thickness of that terminal ileum.

2. Moderate retained stool and old anastomosis of the distal left colon sigmoid

junction, stable.

3. Bilateral renal cysts with no hydronephrosis, solid mass or collecting system

stones.  There is a nonobstructing 8 mm stone in a pyramid in the lower pole on the

left.

4. Status post bilateral total hip arthroplasty without acute bony finding.  No other

significant or acute finding.





<Electronically signed by Jama Fitzgerald > 11/03/21 [id number]

## 2021-11-03 NOTE — CCD
Continuity of Care Document (CCD)

                             Created on: 10/05/2021



Robbin Singleton

External Reference #: [id number]

: 1950

Sex: Male



Demographics





                          Address                   [address] [place]  [address]

 

                          Home Phone                [phone]

 

                          Preferred Language        Unknown

 

                          Marital Status            

 

                          Caodaism Affiliation     Unknown

 

                          Race                      White

 

                          Ethnic Group              Not  or 





Author





                          Author                    Robbin LUA [facility]

 

                          Organization              Unknown

 

                          Address                   [address] [facility] [facility] [address]

[place]  [address]



 

                          Phone                     [phone]







Problems





                    Active Problems     Provider            Date

 

                    Chronic obstructive lung disease Abigail Israel    Onset: 

2002

 

                    Sleep apnea         Pancho Ge D.O., TABITHA Onset: 12/3



 

                    Gastroesophageal reflux disease Pancho Ge D.O., DEVENFP

 Onset: 2002

 

                    Hyperlipidemia      Pancho Ge D.O., FAAFP Onset: 

 

                    Degenerative joint disease involving multiple joints WILLIE Geronimo DO Onset:

2004

 

                    Impotence of organic origin Pancho Ge D.O., FAAFP Ons

et: 2003

 

                    Anxiety state       Simon Moncada M.D. Onset: 2007

 

                    Moderate recurrent major depression Simon Moncada M.D. On

set: 2007

 

                    Hypothyroidism      Simon Moncada M.D. Onset: 2007

 

                    Benign prostatic hyperplasia without outflow obstruction Malachi Moncada M.D. 

Onset: 2007

 

                    Posttraumatic stress disorder Irvin Lua RPA Onset: 

2009

 

                    Type 2 diabetes mellitus Irvin Lua RPA Onset: 

 

                    Vitamin D deficiency Irvin Lua RPA Onset: 

0

 

                    Secondary parkinsonism Irvin Lua RPA Onset: 

013

 

                                        Note: [facility] Treated 

 

                    Kidney stone        Irvin Lua RPA Onset: 2014

 

                                        Note: 14 CT 

 

                    Lumbar radiculopathy Irvin Lua RPA Onset: 10/29/201

5

 

                    Chronic kidney disease stage 4 Irvin Lua, RPA Onset:

 2016

 

                    History of polyp of colon Irvin Lua, RPA Onset: 

 

                                        Note: Colonoscopy 16 Dr. Boby paez 1 yr 







Social History





                Type            Date            Description     Comments

 

                Birth Sex                       Unknown          

 

                Tobacco Use     Start: Unknown  Half a pack a day  

 

                ETOH Use                        Denies alcohol use  

 

                Recreational Drug Use                 Never Used Drugs  

 

                Tobacco Use     Start: Unknown End: Unknown Patient is a former 

smoker  

 

                Exercise Type/Frequency                 exercises sporadically  







Allergies and adverse reactions





             Active Allergies Criticality  Reaction | Severity Comments     Date

 

             Penicillin   Unable to assess criticality                          

 2001

 

             Niaspan      Unable to assess criticality                          

 10/11/2010

 

             Metformin    Unable to assess criticality              Hives       

 2011







Medications





           Active Medications SIG        Qnty       Indications Ordering Provide

r Date

 

                                        Moderna Covid-19 Vaccine                

     100mcg/0.5ML Suspension            

             - 21                           Pancho Ge D.O., FAAFP

 2021

 

                                        Flonase Allergy Relief                  

   50mcg/Act Suspension                 

             2 sprays each nare every day 15.800ml                  Pancho brody D.O., FAAFP 2021

 

                          Viagra                     100mg Tablets              

     1 by mouth every day 

as needed       14tabs                          Pancho Ge D.O., FAAFP 

 

                          Proair HFA                     108(90Base) mcg/Act Aer

osol                   

inhale 2 puffs by mouth every 4 hours as needed for shortness of breath 8.5units

                                        Pancho Ge D.O., FAAFP 2020

 

                          Trulicity                     1.5mg/0.5ML Solution Pen

-Inject                   

inject 1 subcutaneously once a week 2units                          Arnulfo Carreno M.D. 2019

 

                    Prevnar 13                      Suspension                  

 injection x 1       

.500ml                                  Pancho Ge D.O., FAAFP 2018

 

                          Pen Needles                     32G X 4 mm Misc       

            use to inject 

insulin twice a day dx: e 11.9 200units                        Pancho Ge D.O., FAAFP 

2018

 

                          Symbicort                     160-4.5mcg/Act Aerosol  

                 2 puffs 

bid.                                            Unknown         

 

                                        Levemir Flextouch                     10

0Unit/ML Solution Pen-Inject            

                inject 40 units twice a day (change 3/26/20) 45ml               

             Pancho Ge D.O.,

FAAFP                                   

 

                          Montelukast Sodium                     10mg Tablets   

                take 1 

tablet at bedtime                                 Unknown         







Medications Administered in Office





           Medication SIG        Qnty       Indications Ordering Provider Date

 

             Injection (SC)/(Im)                      Injection                 

                                         

Sofía Nuno D., FNP-C             2014

 

             Injection (SC)/(Im)                      Injection                 

                                         

Irvin Lua, ALINE                  10/17/2012

 

             Injection (SC)/(Im)                      Injection                 

                                         Simon Moncada M.D.                         2002







Immunizations





           CPT Code   Status     Date       Vaccine    Reaction   Lot #

 

                [id number]           Given           10/05/2021      Influenza Virus 

Vaccine, Quadrivalent, Slit Virus, Im Use

3Y & Up                                             [id number]

 

                [id number]           Given           10/21/2015      Influenza Vaccin

e (Fluzone) 3Yrs Of Age Or Older Medicare

Plans                                                

 

                [id number]           Given           10/20/2014      Influenza Vaccin

e (Fluzone) 3Yrs Of Age Or Older Medicare

Plans                                               [id number]

 

           16271      Given      10/20/2014 Pneumococcal Immunization           

 [id number]

 

                89901           Given           10/17/2012      Influenza Vaccin

e (Fluzone) 3Yrs Of Age Or Older Medicare

Plans                                                

 

                92838           Given           10/17/2012      Tdap Tetanus,Dip

htheria Toxoids/Acellular Pertussis 7Yrs 

Or Older                                            [id number]

 

                [id number]           Given           10/17/2012      Influenza Virus 

Vac. Split Virus Individuals 3 Years And 

Above                                               [id number]

 

                66583           Given           10/15/2011      Influenza Virus 

Vac. Split Virus Individuals 3 Years And 

Above                                                

 

                01684           Given           2009      Influenza Virus 

Vac. Split Virus Individuals 3 Years And 

Above                                               [id number]

 

           75963      Given      10/10/2008 Pneumococcal Immunization           

 [id number]

 

                84736           Given           10/10/2008      Influenza Virus 

Vac. Split Virus Individuals 3 Years And 

Above                                               [id number]

 

           24452      Given      2002 Influenza Virus Vac. Whole Virus    

         

 

                15029           Given           2002      Influenza Virus 

Vac. Split Virus Individuals 3 Years And 

Above                                                

 

                                          

 

                89698           Refused         2020      Influenza Virus 

Vaccine, Quadrivalent, Slit Virus, Im 

Use 3Y & Up               RECEIVED AT [facility] [place] 1 WEEK AGO  







Vital Signs





                Date            Vital           Result          Comment

 

                10/05/2021  9:08am BP Systolic     122 mmHg         

 

                    BP Diastolic        76 mmHg              

 

                    Body Temperature    97.6 F             

 

                    Heart Rate          85 /min              

 

                    Respiratory Rate    16 /min              

 

                    Height              70 inches           5'10"

 

                    Weight              231.00 lb            

 

                    Ideal Body Weight   166 lb               

 

                    BMI (Body Mass Index) 33.1 kg/m2           

 

                    O2 % BldC Oximetry  97 %                 

 

                2021  1:08pm BP Systolic     102 mmHg         

 

                    BP Diastolic        72 mmHg              

 

                    Body Temperature    97.6 F             

 

                    Heart Rate          53 /min              

 

                    Respiratory Rate    16 /min              

 

                    Height              70 inches           5'10"

 

                    Weight              241.00 lb            

 

                    Ideal Body Weight   166 lb               

 

                    BMI (Body Mass Index) 34.6 kg/m2           

 

                    O2 % BldC Oximetry  94 %                 







Results





        Test    Acquired Date Facility Test    Result  H/L     Range   Note

 

                    Laboratory test finding 2021          [facility]

             Hemoglobin A1c 6.3 %        High         4.50-6.20     

 

                    Laboratory test finding 2021          [facility]

             Hemoglobin A1c 6.3 %        High         4.50-6.20     

 

                    Laboratory test finding 2021          [facility]

             PSA, Total   0.89 ng/mL                0.0 - 4.0     

 

                    CMP                 2021          [facility]

             Glu          207 mg/dL    High         70 - 110     1

 

             BUN          20 mg/dL                  8 - 23        

 

             Creat        1.1 mg/dL                 0.7 - 1.2     

 

             BUN/Creatinine Ratio 18.2 CALC                               

 

             Na           134 mmol/L   Low          136 - 145     

 

             K            4.1 mmol/L                3.5 - 5.1     

 

             CL           99.9 mmol/L               98.0 - 107.0  

 

             Co2          21.9 mmol/L  Low          22.0 - 29.0   

 

             CA           8.8 mg/dL                 8.6 - 10.2    

 

             TP           6.2 g/dL     Low          6.6 - 8.7     

 

             Alb          4.2 g/dL                  3.5 - 5.2     

 

             A/G Ratio    2.1 CALC                                

 

             Globulin     2.0 CALC                                

 

             Alp          79.4 U/L                  40 - 129      

 

             Alt (SGPT)   11 U/L                    0 - 41        

 

             Ast (Sgot)   12 U/L                    0 - 40        

 

             Tbili        0.52 mg/dL                0.0 - 1.2     

 

             Osmolality-Calculated 276.4 CALC                              

 

             Anion Gap    16 mmol/L                               

 

             eGFR  78 #                      Calc         2

 

             eGFR Non-Afr. American 67 #                      Calc         3

 

                    Lipid Panel         2021          [facility]

             Chol         180 mg/dL                 0 - 200       

 

             Trig         191 mg/dL                 35 - 200      

 

             HDL          42 mg/dL                  35 - 55       

 

             LDL_C        100 Calc                  75 - 129      

 

             Cho/HDL Ratio 4.3 CALC                                







                          1                         CHRONIC KIDNEY DISEASE STAGI

NG PER NKF:   MALE GFR INTERPRETATION:  20-49 YRS:

    >60 mL/min  Normal 50-59 YRS:     >56 mL/min  Normal 60-69 YRS:     >49 
mL/min  Normal 70-79 YRS:     >42 mL/min  Normal 80 and above  >35 mL/min  
Normal   FEMALE GRF INTERPRETATION:  20-39 YRS:    >60 mL/min  Normal 40-49 YRS:
   >58 mL/min  Normal 50-59 YRS:    >51 mL/min  Normal 60-69 YRS:    >45 mL/min 
Normal 70-79 YRS:    >39 mL/min  Normal 80 and above >32 mL/min  
NormalCLASSIFICATION            CHOLESTEROL FOR ADULTS       
CHILDREN/ADOLESCENTS*   DESIRABLE:                <200 MG/DL                   
<170 MG/DL  BORDER-LINE HIGH RISK:    200-239 MG/DL                170-199 MG/DL
 HIGH RISK:                >240 MG/DL                   >200 MG/DL    CLASS. FOR
PRIMARY LDL CHOL PREVENTION:        LDL CHOL-CHILD/ADOLESCENTS*   DESIRABLE:    
         <130 MG/DL              <110 MG/DL  BORDERLINE-HIGH RISK:   130-159 
MG/DL           110-129 MG/DL  HIGH RISK:              >160 MG/DL              
>130 MG/DL   *CHILDREN AND ADOLESCENTS REPRESENTS INDIVIDUALA AGED 2-19 YEARS 
EXCLUSIVE.

 

                          2                         CKD-EPI



 

                          3                         CKD-EPI









Procedures





                Date            Code            Description     Status

 

                2021      79549           Office/Outpatient Established Mo

d MDM 30-39 Min Completed

 

                2021      01462           Capillary Blood Collection Finge

r, Heel, Ear Stick Completed







Medical Devices





                                        Description

 

                                        No Information Available







Encounters





           Type       Date       Location   Provider   Dx         Diagnosis

 

           Office Visit 2021  1:00p [facility] Irvin Lua, RP

A E11.9      

Type 2 diabetes mellitus without complications

 

                          J44.9                     Chronic obstructive pulmonar

y disease, unspecified

 

                          E78.5                     Hyperlipidemia, unspecified

 

                          G47.30                    Sleep apnea, unspecified

 

                          E03.9                     Hypothyroidism, unspecified







Assessments





                Date            Code            Description     Provider

 

                2021      E11.9           Type 2 diabetes mellitus without

 complications Irvin Lua, RPA

 

                2021      J44.9           Chronic obstructive pulmonary di

sease, unspecified Irvin Lua, RPA

 

                2021      E78.5           Hyperlipidemia, unspecified Irvin Simpson, RPA

 

                2021      G47.30          Sleep apnea, unspecified Irvin Lua, RPA

 

                2021      E03.9           Hypothyroidism, unspecified Irvin Simpson, RPA

 

                2021      E11.9           Type 2 diabetes mellitus without

 complications Laboratory 

[facility]

 

                2021      E11.9           Type 2 diabetes mellitus without

 complications Diego Carreno M.D.

 

                2021      E11.9           Type 2 diabetes mellitus without

 complications Laboratory 

[facility]

 

                2021      J44.9           Chronic obstructive pulmonary di

sease, unspecified Diego Carreno M.D.

 

                2021      J44.9           Chronic obstructive pulmonary di

sease, unspecified Laboratory 

[facility]

 

                2021      E78.5           Hyperlipidemia, unspecified [facility]

Diego arnold M.D.

 

                2021      E78.5           Hyperlipidemia, unspecified Labo

[facility]

 

                2021      Z12.5           Encounter for screening for karen

gnant neoplasm of prostate 

Diego Carreno M.D.

 

                2021      Z12.5           Encounter for screening for karen

gnant neoplasm of prostate 

Laboratory [facility]







Plan of Treatment

2009 - Simon Moncada M.D.* 496 COPD* Comments:* Discussed current use 
  of MDIs/Nebulyzed rx.  Ensured appropriate technique.



* Follow up:* In 3 months, For Complete Annual Exam.





* 780.57 Sleep Apnea Other Unspecified

* 530.81 Esophageal Reflux

* 244.9 Hypothyroidism Unspecified

* 272.4 Hyperlipidemia Oth Unspec

* 715.09 Osteoarth General Multiple Sites* Follow up:* In 3 months, For Complete
  Annual Exam.





* V04.81 Flu Vaccine

* All * New Medication:* Xopenex HFA 45 mcg/Act - 2 puffs q4-6h prn shortness of
  breath









Functional Status





                                        Description

 

                                        No Information Available







Mental Status





                                        Description

 

                                        No Information Available







Referrals





                                        Description

 

                                        No Information Available

## 2021-11-03 NOTE — CCD
Continuity of Care Document (CCD)

                             Created on: 10/05/2021



Robbin Singleton

External Reference #: [id number]

: 1950

Sex: Male



Demographics





                          Address                   [address] [place]  [address]

 

                          Home Phone                [phone]

 

                          Preferred Language        Unknown

 

                          Marital Status            

 

                          Yazidi Affiliation     Unknown

 

                          Race                      White

 

                          Ethnic Group              Not  or 





Author





                          Author                    Robbin LUA [facility]

 

                          Organization              Unknown

 

                          Address                   [address]

[place]  [address]



 

                          Phone                     [phone]







Problems





                    Active Problems     Provider            Date

 

                    Chronic obstructive lung disease Abigail Israel    Onset: 

2002

 

                    Sleep apnea         Pancho Ge D.O., TABITHA Onset: 12/3



 

                    Gastroesophageal reflux disease Pancho Ge D.O., DEVENFP

 Onset: 2002

 

                    Hyperlipidemia      Pancho Ge D.O., FAAFP Onset: 

 

                    Degenerative joint disease involving multiple joints WILLIE Geronimo DO Onset:

2004

 

                    Impotence of organic origin Pancho Ge D.O., FAAFP Ons

et: 2003

 

                    Anxiety state       Simon Moncada M.D. Onset: 2007

 

                    Moderate recurrent major depression Simon Moncada M.D. On

set: 2007

 

                    Hypothyroidism      Simon Moncada M.D. Onset: 2007

 

                    Benign prostatic hyperplasia without outflow obstruction Malachi Moncada M.D. 

Onset: 2007

 

                    Posttraumatic stress disorder Irvin Lua RPA Onset: 

2009

 

                    Type 2 diabetes mellitus Irvin Lua RPA Onset: 

 

                    Vitamin D deficiency Irvin Lua RPA Onset: 

0

 

                    Secondary parkinsonism Irvin Lua RPA Onset: 

013

 

                                        Note: [facility] 

 

                    Kidney stone        Irvin Lua RPA Onset: 2014

 

                                        Note: 14 CT 

 

                    Lumbar radiculopathy Irvin Lua RPA Onset: 10/29/201

5

 

                    Chronic kidney disease stage 4 Irvin Lua, RPA Onset:

 2016

 

                    History of polyp of colon Irvin Lua, RPA Onset: 

 

                                        Note: Colonoscopy 16 Dr. Boby paez 1 yr 







Social History





                Type            Date            Description     Comments

 

                Birth Sex                       Unknown          

 

                Tobacco Use     Start: Unknown  Half a pack a day  

 

                ETOH Use                        Denies alcohol use  

 

                Recreational Drug Use                 Never Used Drugs  

 

                Tobacco Use     Start: Unknown End: Unknown Patient is a former 

smoker  

 

                Exercise Type/Frequency                 exercises sporadically  







Allergies and adverse reactions





             Active Allergies Criticality  Reaction | Severity Comments     Date

 

             Penicillin   Unable to assess criticality                          

 2001

 

             Niaspan      Unable to assess criticality                          

 10/11/2010

 

             Metformin    Unable to assess criticality              Hives       

 2011







Medications





           Active Medications SIG        Qnty       Indications Ordering Provide

r Date

 

                                        Moderna Covid-19 Vaccine                

     100mcg/0.5ML Suspension            

             - 21                           Pancho Ge D.O., FAAFP

 2021

 

                                        Flonase Allergy Relief                  

   50mcg/Act Suspension                 

             2 sprays each nare every day 15.800ml                  Pancho brody D.O., FAAFP 2021

 

                          Viagra                     100mg Tablets              

     1 by mouth every day 

as needed       14tabs                          Pancho Ge D.O., FAAFP 

 

                          Proair HFA                     108(90Base) mcg/Act Aer

osol                   

inhale 2 puffs by mouth every 4 hours as needed for shortness of breath 8.5units

                                        Pancho Ge D.O., FAAFP 2020

 

                          Trulicity                     1.5mg/0.5ML Solution Pen

-Inject                   

inject 1 subcutaneously once a week 2units                          Arnulfo Carreno M.D. 2019

 

                    Prevnar 13                      Suspension                  

 injection x 1       

.500ml                                  Pancho eG D.O., FAAFP 2018

 

                          Pen Needles                     32G X 4 mm Misc       

            use to inject 

insulin twice a day dx: e 11.9 200units                        Pancho Ge D.O., FAAFP 

2018

 

                          Symbicort                     160-4.5mcg/Act Aerosol  

                 2 puffs 

bid.                                            Unknown         

 

                                        Levemir Flextouch                     10

0Unit/ML Solution Pen-Inject            

                inject 40 units twice a day (change 3/26/20) 45ml               

             Pancho Ge D.O.,

FAAFP                                   







Medications Administered in Office





           Medication SIG        Qnty       Indications Ordering Provider Date

 

             Injection (SC)/(Im)                      Injection                 

                                         

Sofía Nuno D., FNP-C             2014

 

             Injection (SC)/(Im)                      Injection                 

                                         

Irvin Lua, ALINE                  10/17/2012

 

             Injection (SC)/(Im)                      Injection                 

                                         Simon Moncada M.D.                         2002







Immunizations





           CPT Code   Status     Date       Vaccine    Reaction   Lot #

 

                [id number]           Given           10/05/2021      Influenza Virus 

Vaccine, Quadrivalent, Slit Virus, Im Use

3Y & Up                                             [id number]

 

                [id number]           Given           10/21/2015      Influenza Vaccin

e (Fluzone) 3Yrs Of Age Or Older Medicare

Plans                                                

 

                15564           Given           10/20/2014      Influenza Vaccin

e (Fluzone) 3Yrs Of Age Or Older Medicare

Plans                                               [id number]

 

           95898      Given      10/20/2014 Pneumococcal Immunization           

 [id number]

 

                37494           Given           10/17/2012      Influenza Vaccin

e (Fluzone) 3Yrs Of Age Or Older Medicare

Plans                                                

 

                57671           Given           10/17/2012      Tdap Tetanus,Dip

htheria Toxoids/Acellular Pertussis 7Yrs 

Or Older                                            [id number]

 

                38715           Given           10/17/2012      Influenza Virus 

Vac. Split Virus Individuals 3 Years And 

Above                                               [id number]

 

                [id number]           Given           10/15/2011      Influenza Virus 

Vac. Split Virus Individuals 3 Years And 

Above                                                

 

                06752           Given           2009      Influenza Virus 

Vac. Split Virus Individuals 3 Years And 

Above                                               [id number]

 

           68291      Given      10/10/2008 Pneumococcal Immunization           

 [id number]

 

                32334           Given           10/10/2008      Influenza Virus 

Vac. Split Virus Individuals 3 Years And 

Above                                               [id number]

 

           99657      Given      2002 Influenza Virus Vac. Whole Virus    

         

 

                60993           Given           2002      Influenza Virus 

Vac. Split Virus Individuals 3 Years And 

Above                                                

 

                                          

 

                [id number]           Refused         2020      Influenza Virus 

Vaccine, Quadrivalent, Slit Virus, Im 

Use 3Y & Up               RECEIVED AT [facility] 1 WEEK AGO  







Vital Signs





                Date            Vital           Result          Comment

 

                10/05/2021  9:08am BP Systolic     122 mmHg         

 

                    BP Diastolic        76 mmHg              

 

                    Body Temperature    97.6 F             

 

                    Heart Rate          85 /min              

 

                    Respiratory Rate    16 /min              

 

                    Height              70 inches           5'10"

 

                    Weight              231.00 lb            

 

                    Ideal Body Weight   166 lb               

 

                    BMI (Body Mass Index) 33.1 kg/m2           

 

                    O2 % BldC Oximetry  97 %                 

 

                2021  1:08pm BP Systolic     102 mmHg         

 

                    BP Diastolic        72 mmHg              

 

                    Body Temperature    97.6 F             

 

                    Heart Rate          53 /min              

 

                    Respiratory Rate    16 /min              

 

                    Height              70 inches           5'10"

 

                    Weight              241.00 lb            

 

                    Ideal Body Weight   166 lb               

 

                    BMI (Body Mass Index) 34.6 kg/m2           

 

                    O2 % BldC Oximetry  94 %                 







Results





        Test    Acquired Date Facility Test    Result  H/L     Range   Note

 

                    Laboratory test finding 10/05/2021          [facility]

             Hemoglobin A1c 5.9 %                     4.50-6.20     

 

                    Laboratory test finding 2021          [facility]

             Hemoglobin A1c 6.3 %        High         4.50-6.20     

 

                    Laboratory test finding 2021          [facility]

             Hemoglobin A1c 6.3 %        High         4.50-6.20     

 

                    Laboratory test finding 2021          [facility]

             PSA, Total   0.89 ng/mL                0.0 - 4.0     

 

                    CMP                 2021          [facility]

             Glu          207 mg/dL    High         70 - 110     1

 

             BUN          20 mg/dL                  8 - 23        

 

             Creat        1.1 mg/dL                 0.7 - 1.2     

 

             BUN/Creatinine Ratio 18.2 CALC                               

 

             Na           134 mmol/L   Low          136 - 145     

 

             K            4.1 mmol/L                3.5 - 5.1     

 

             CL           99.9 mmol/L               98.0 - 107.0  

 

             Co2          21.9 mmol/L  Low          22.0 - 29.0   

 

             CA           8.8 mg/dL                 8.6 - 10.2    

 

             TP           6.2 g/dL     Low          6.6 - 8.7     

 

             Alb          4.2 g/dL                  3.5 - 5.2     

 

             A/G Ratio    2.1 CALC                                

 

             Globulin     2.0 CALC                                

 

             Alp          79.4 U/L                  40 - 129      

 

             Alt (SGPT)   11 U/L                    0 - 41        

 

             Ast (Sgot)   12 U/L                    0 - 40        

 

             Tbili        0.52 mg/dL                0.0 - 1.2     

 

             Osmolality-Calculated 276.4 CALC                              

 

             Anion Gap    16 mmol/L                               

 

             eGFR  78 #                      Calc         2

 

             eGFR Non-Afr. American 67 #                      Calc         3

 

                    Lipid Panel         2021          [facility]

             Chol         180 mg/dL                 0 - 200       

 

             Trig         191 mg/dL                 35 - 200      

 

             HDL          42 mg/dL                  35 - 55       

 

             LDL_C        100 Calc                  75 - 129      

 

             Cho/HDL Ratio 4.3 CALC                                







                          1                         CHRONIC KIDNEY DISEASE STAGI

NG PER NKF:   MALE GFR INTERPRETATION:  20-49 YRS:

    >60 mL/min  Normal 50-59 YRS:     >56 mL/min  Normal 60-69 YRS:     >49 
mL/min  Normal 70-79 YRS:     >42 mL/min  Normal 80 and above  >35 mL/min  
Normal   FEMALE GRF INTERPRETATION:  20-39 YRS:    >60 mL/min  Normal 40-49 YRS:
   >58 mL/min  Normal 50-59 YRS:    >51 mL/min  Normal 60-69 YRS:    >45 mL/min 
Normal 70-79 YRS:    >39 mL/min  Normal 80 and above >32 mL/min  
NormalCLASSIFICATION            CHOLESTEROL FOR ADULTS       
CHILDREN/ADOLESCENTS*   DESIRABLE:                <200 MG/DL                   
<170 MG/DL  BORDER-LINE HIGH RISK:    200-239 MG/DL                170-199 MG/DL
 HIGH RISK:                >240 MG/DL                   >200 MG/DL    CLASS. FOR
PRIMARY LDL CHOL PREVENTION:        LDL CHOL-CHILD/ADOLESCENTS*   DESIRABLE:    
         <130 MG/DL              <110 MG/DL  BORDERLINE-HIGH RISK:   130-159 
MG/DL           110-129 MG/DL  HIGH RISK:              >160 MG/DL              
>130 MG/DL   *CHILDREN AND ADOLESCENTS REPRESENTS INDIVIDUALA AGED 2-19 YEARS 
EXCLUSIVE.

 

                          2                         CKD-EPI



 

                          3                         CKD-EPI









Procedures





                Date            Code            Description     Status

 

                10/05/2021      21509           Office/Outpatient Established Mo

d MDM 30-39 Min Completed

 

                2021      60175           Office/Outpatient Established Mo

d MDM 30-39 Min Completed

 

                2021      72237           Capillary Blood Collection Finge

r, Heel, Ear Stick Completed







Medical Devices





                                        Description

 

                                        No Information Available







Encounters





           Type       Date       Location   Provider   Dx         Diagnosis

 

           Office Visit 10/05/2021  9:15a [facility] Irvin Lua, RP

A E11.9      

Type 2 diabetes mellitus without complications

 

                          J44.9                     Chronic obstructive pulmonar

y disease, unspecified

 

                          E78.5                     Hyperlipidemia, unspecified

 

                          G47.30                    Sleep apnea, unspecified

 

                          E03.9                     Hypothyroidism, unspecified

 

           Office Visit 2021  1:00p [facility] Irvin Lua, RP

A E11.9      

Type 2 diabetes mellitus without complications

 

                          J44.9                     Chronic obstructive pulmonar

y disease, unspecified

 

                          E78.5                     Hyperlipidemia, unspecified

 

                          G47.30                    Sleep apnea, unspecified

 

                          E03.9                     Hypothyroidism, unspecified







Assessments





                Date            Code            Description     Provider

 

                10/05/2021      E11.9           Type 2 diabetes mellitus without

 complications Irvin Lua, RPA

 

                10/05/2021      J44.9           Chronic obstructive pulmonary di

sease, unspecified Irvin Lua, RPA

 

                10/05/2021      E78.5           Hyperlipidemia, unspecified Irvin Simpson, RPA

 

                10/05/2021      G47.30          Sleep apnea, unspecified Irvin Lua, RPA

 

                10/05/2021      E03.9           Hypothyroidism, unspecified Irvin Simpson, RPA

 

                2021      E11.9           Type 2 diabetes mellitus without

 complications Irvin Lua, RPA

 

                2021      J44.9           Chronic obstructive pulmonary di

sease, unspecified Irvin Lua, RPA

 

                2021      E78.5           Hyperlipidemia, unspecified Irvin Simpson, RPA

 

                2021      G47.30          Sleep apnea, unspecified Irvin Lua, RPA

 

                2021      E03.9           Hypothyroidism, unspecified Irvin Simpson, RPA

 

                2021      E11.9           Type 2 diabetes mellitus without

 complications Laboratory 

[facility] Schedule

 

                2021      E11.9           Type 2 diabetes mellitus without

 complications Diego Carreno M.D.

 

                2021      E11.9           Type 2 diabetes mellitus without

 complications Laboratory 

[facility] Schedule

 

                2021      J44.9           Chronic obstructive pulmonary di

sease, unspecified Diego Carreno M.D.

 

                2021      J44.9           Chronic obstructive pulmonary di

sease, unspecified Laboratory 

[facility] Schedule

 

                2021      E78.5           Hyperlipidemia, unspecified [facility]

Diego arnold M.D.

 

                2021      E78.5           Hyperlipidemia, unspecified [facility]

ratory [facility] Schedule

 

                2021      Z12.5           Encounter for screening for karen

gnant neoplasm of prostate 

Diego Carreno M.D.

 

                2021      Z12.5           Encounter for screening for karen

gnant neoplasm of prostate 

Laboratory [facility] Schedule







Plan of Treatment

Future Appointment(s):* 2022  9:00 am - Irvin Lua, RPA at 
  [facility]

2009 - Simon Moncada M.D.* 496 COPD* Comments:* Discussed current use 
  of MDIs/Nebulyzed rx.  Ensured appropriate technique.



* Follow up:* In 3 months, For Complete Annual Exam.





* 780.57 Sleep Apnea Other Unspecified

* 530.81 Esophageal Reflux

* 244.9 Hypothyroidism Unspecified

* 272.4 Hyperlipidemia Oth Unspec

* 715.09 Osteoarth General Multiple Sites* Follow up:* In 3 months, For Complete
  Annual Exam.





* V04.81 Flu Vaccine

* All * New Medication:* Xopenex HFA 45 mcg/Act - 2 puffs q4-6h prn shortness of
  breath









Functional Status





                                        Description

 

                                        No Information Available







Mental Status





                                        Description

 

                                        No Information Available







Referrals





                                        Description

 

                                        No Information Available

## 2021-11-03 NOTE — CCD
Summarization Of Episode

                             Created on: 2021



BISI SINGLETON

External Reference #: [id number]

: 1950

Sex: Undifferentiated



Demographics





                          Address                   [address] [place]  [address]

 

                          Home Phone                [phone]

 

                          Preferred Language        Unknown

 

                          Marital Status            Unknown

 

                          Amish Affiliation     Unknown

 

                          Race                      Unknown

 

                          Ethnic Group              Not  or 





Author





                          Author                    [facility]

 

                          Organization              [facility]

 

                          Address                   Unknown

 

                          Phone                     Unavailable







Support





                Name            Relationship    Address         Phone

 

                    MIHAI  NEELA   Next Of Kin         [address]

[place]  [address]                [phone]

 

                RET             Next Of Kin     Unknown         Unavailable

 

                    CLIFF SINGLETON      Next Of Kin         [address], [place]                        [phone]

 

                UNK             Next Of Kin     Unknown         Unavailable

 

                    ANGELICA SINGLETON  Next Of Kin         [address]

[place]  [address]                     [phone]

 

                RE              Next Of Kin     Unknown         Unavailable

 

                    [place]            Next Of Kin         [address] [place]  [address]                       [phone]

 

                    MIHAI, ([facility]) ALAYNA Next Of Kin         [address] [place]  [address]                     [phone]

 

                    NEELA SINGLETON   ECON                [address] 

[place]  [address]                 Unavailable

 

                    Alayna Singleton     ECON                [address] [address] [address]

[place]  [address]                     [phone]







Care Team Providers





                    Care Team Member Name Role                Phone

 

                    Araseli Paula FNP Unavailable         Unavailable

 

                    Charlotte, Araseli Pacheco FNP Unavailable         Unavailable

 

                    Charlotte, Araseli Pacheco FNP Unavailable         Unavailable

 

                    Charlotte, Araseli Pacheco FNP Unavailable         Unavailable

 

                    Nisa, Araseli Pacheco FNP Unavailable         Unavailable

 

                    Nisa, Araseli Pacheco FNP Unavailable         Unavailable

 

                    Nisa, Araseli Pacheco FNP Unavailable         Unavailable

 

                    Nisa, Araseli Pacheco FNP Unavailable         Unavailable

 

                    Nisa, Araseli Pacheco FNP Unavailable         Unavailable

 

                    Nisa, Araseli Pacheco FNP Unavailable         Unavailable

 

                    Charlotte, Araseli Pacheco FNP Unavailable         Unavailable

 

                    Nisa, Araseli Pacheco FNP Unavailable         Unavailable

 

                    Nisa, Araseli Pacheco FNP Unavailable         Unavailable

 

                    Nisa, Araseli Pacheco FNP Unavailable         Unavailable

 

                    HEIDI Lua PA Unavailable         Unavailable

 

                    HEIDI Lua PA Unavailable         Unavailable

 

                    HEIDI Lua PA Unavailable         Unavailable

 

                    HEIDI Lua PA Unavailable         Unavailable

 

                    Stoney, D Irvin PA Unavailable         Unavailable

 

                    Stoney, D Irvin PA Unavailable         Unavailable

 

                    Stoney, D Irvin PA Unavailable         Unavailable

 

                    Stoney, D Irvin PA Unavailable         Unavailable

 

                    Stoney, D Irvin PA Unavailable         Unavailable

 

                    Stoney, D Irvin PA Unavailable         Unavailable

 

                    Stoney, D Irvin PA Unavailable         Unavailable

 

                    Stoney, D Irvin PA Unavailable         Unavailable

 

                    Stoney, D Irvin PA Unavailable         Unavailable

 

                    Stoney, D Irvin PA Unavailable         Unavailable

 

                    Stoney, D Irvin PA Unavailable         Unavailable

 

                    Stoney, D Irvin PA Unavailable         Unavailable

 

                    Stoney, D Irvin PA Unavailable         Unavailable

 

                    Stoney, D Irvin PA Unavailable         Unavailable

 

                    Stoney, D Irvin PA Unavailable         Unavailable

 

                    Stoney, D Irvin PA Unavailable         Unavailable

 

                    Stoney, D Irvin PA Unavailable         Unavailable

 

                    Stoney, D Irvin PA Unavailable         Unavailable

 

                    Stoney, D Irvin PA Unavailable         Unavailable

 

                    Stoney, D Irvin PA Unavailable         Unavailable

 

                    Stoney, D Irvin PA Unavailable         Unavailable

 

                    Stoney, D Irvin PA Unavailable         Unavailable

 

                    Stoney, D Irvin PA Unavailable         Unavailable

 

                    Stoney, D Irvin PA Unavailable         Unavailable

 

                    Stoney, D Irvin PA Unavailable         Unavailable

 

                    Stoney, D Irvin PA Unavailable         Unavailable

 

                    Stoney, D Irvin PA Unavailable         Unavailable

 

                    Stoney, D Irvin PA Unavailable         Unavailable

 

                    Stoney, D Irvin PA Unavailable         Unavailable

 

                    Stoney, D Irvin PA Unavailable         Unavailable

 

                    Stoney, D Irvin PA Unavailable         Unavailable

 

                    Stoney, D Irvin PA Unavailable         Unavailable

 

                    Stoney, D Irvin PA Unavailable         Unavailable

 

                    Stoney, D Irvin PA Unavailable         Unavailable

 

                    Stoney, D Irvin PA Unavailable         Unavailable

 

                    Stoney, D Irvin PA Unavailable         Unavailable

 

                    Stoney, D Irvin PA Unavailable         Unavailable

 

                    Stoney, D Irvin PA Unavailable         Unavailable

 

                    Stoney, D Irvin PA Unavailable         Unavailable

 

                    Stoney, D Irvin PA Unavailable         Unavailable

 

                    Stoney, D Irvin PA Unavailable         Unavailable

 

                    Stoney, D Irvin PA Unavailable         Unavailable

 

                    Stoney, D Irvin PA Unavailable         Unavailable

 

                    Stoney, D Irvin PA Unavailable         Unavailable

 

                    Stoney, D Irvin PA Unavailable         Unavailable

 

                    Stoney, D Irvin PA Unavailable         Unavailable

 

                    Stoney, D Irvin PA Unavailable         Unavailable

 

                    Stoney, D Irvin PA Unavailable         Unavailable

 

                    Stoney, D Irvin PA Unavailable         Unavailable

 

                    Stoney, D Irvin PA Unavailable         Unavailable

 

                    Stoney, D Irvin PA Unavailable         Unavailable

 

                    Stoney, D Irvin PA Unavailable         Unavailable

 

                    Stoney, D Irvin PA Unavailable         Unavailable

 

                    Stoney, D Irvin PA Unavailable         Unavailable

 

                    Stoney, D Irvin PA Unavailable         Unavailable

 

                    Stoney, D Irvin PA Unavailable         Unavailable

 

                    Stoney, D Irvin PA Unavailable         Unavailable

 

                    Stoney, D Irvin PA Unavailable         Unavailable

 

                    Stoney, D Irvin PA Unavailable         Unavailable

 

                    Stoney, D Irvin PA Unavailable         Unavailable

 

                    Stoney, D Irvin PA Unavailable         Unavailable

 

                    Stoney, D Irvin PA Unavailable         Unavailable

 

                    Stoney, D Irvin PA Unavailable         Unavailable

 

                    Stoney, D Irvin PA Unavailable         Unavailable

 

                    Gosselin JR, J Leo MD Unavailable         Unavailable

 

                    Gosselin JR, J Leo MD Unavailable         Unavailable

 

                    Gosselin JR, J Leo MD Unavailable         Unavailable

 

                    Gosselin JR, J Leo MD Unavailable         Unavailable

 

                    Gosselin JR, J Leo MD Unavailable         Unavailable

 

                    Gosselin JR, J Leo MD Unavailable         Unavailable

 

                    Gosselin JR, J Leo MD Unavailable         Unavailable

 

                    Gosselin JR, J Leo MD Unavailable         Unavailable

 

                    Gosselin JR, J Leo MD Unavailable         Unavailable

 

                    Gosselin JR, J Leo MD Unavailable         Unavailable

 

                    Gosselin JR, J Leo MD Unavailable         Unavailable

 

                    Gosselin JR, J Leo MD Unavailable         Unavailable

 

                    Gosselin JR, J Leo MD Unavailable         Unavailable

 

                    Gosselin JR, J Leo MD Unavailable         Unavailable

 

                    Gosselin JR, J Leo MD Unavailable         Unavailable

 

                    Gosselin JR, J Leo MD Unavailable         Unavailable

 

                    Gosselin JR, J Leo MD Unavailable         Unavailable

 

                    Gosselin JR, J Leo MD Unavailable         Unavailable

 

                    Gosselin JR, J Leo MD Unavailable         Unavailable

 

                    Gosselin JR, J Leo MD Unavailable         Unavailable

 

                    Gosselin JR, J Leo MD Unavailable         Unavailable

 

                    Gosselin JR, J Leo MD Unavailable         Unavailable

 

                    Gosselin JR, J Leo MD Unavailable         Unavailable

 

                    Gosselin JR, J Leo MD Unavailable         Unavailable

 

                    Gosselin JR, J Leo MD Unavailable         Unavailable

 

                    Gosselin JR, J Leo MD Unavailable         Unavailable

 

                    Gosselin JR, J Leo MD Unavailable         Unavailable

 

                    Gosselin JR, J Leo MD Unavailable         Unavailable

 

                    Gosselin JR, J Leo MD Unavailable         Unavailable

 

                    Gosselin JR, J Leo MD Unavailable         Unavailable

 

                    Gosselin JR, J Leo MD Unavailable         Unavailable

 

                    Gosselin JR, J Leo MD Unavailable         Unavailable

 

                    Gosselin JR, J Leo MD Unavailable         Unavailable

 

                    Gosselin JR, J Leo MD Unavailable         Unavailable

 

                    Gosselin JR, J Leo MD Unavailable         Unavailable

 

                    Gosselin JR, J Leo MD Unavailable         Unavailable

 

                    Gosselin JR, J Leo MD Unavailable         Unavailable

 

                    Gosselin JR, J Leo MD Unavailable         Unavailable

 

                    Gosselin JR, J Leo MD Unavailable         Unavailable

 

                    Gosselin JR, J Leo MD Unavailable         Unavailable

 

                    Gosselin JR, J Leo MD Unavailable         Unavailable

 

                    Gosselin JR, J Leo MD Unavailable         Unavailable

 

                    Gosselin JR, J Leo MD Unavailable         Unavailable

 

                    Gosselin JR, J Leo MD Unavailable         Unavailable

 

                    Gosselin JR, J Leo MD Unavailable         Unavailable

 

                    Gosselin JR, J Leo MD Unavailable         Unavailable

 

                    Gosselin JR, J Leo MD Unavailable         Unavailable

 

                    Gosselin JR, J Leo MD Unavailable         Unavailable

 

                    Gosselin JR, J Leo MD Unavailable         Unavailable

 

                    Gosselin JR, J Leo MD Unavailable         Unavailable

 

                    Gosselin JR, J Leo MD Unavailable         Unavailable

 

                    Gosselin JR, J Leo MD Unavailable         Unavailable

 

                    Gosselin JR, J Leo MD Unavailable         Unavailable

 

                    Gosselin JR, J Leo MD Unavailable         Unavailable

 

                    EscobarHugh MD Unavailable         Unavailable

 

                    EscobarHugh MD Unavailable         Unavailable

 

                    EscobarHugh MD Unavailable         Unavailable

 

                    EscobarHugh MD Unavailable         Unavailable

 

                    EscobarHugh MD Unavailable         Unavailable

 

                    EscobarHugh MD Unavailable         Unavailable

 

                    EscobarHugh MD Unavailable         Unavailable

 

                    EscobarHugh MD Unavailable         Unavailable

 

                    EscobarHugh MD Unavailable         Unavailable

 

                    EscobarHugh MD Unavailable         Unavailable

 

                    EscobarHugh MD Unavailable         Unavailable

 

                    Hugh Escobar MD Unavailable         Unavailable

 

                    Hugh Escobar MD Unavailable         Unavailable

 

                    Hugh Escobar MD Unavailable         Unavailable

 

                    Hugh Escobar MD Unavailable         Unavailable

 

                    EscobarHugh MD Unavailable         Unavailable

 

                    EscobarHugh MD Unavailable         Unavailable

 

                    Hugh Escobar MD Unavailable         Unavailable

 

                    EscobarHugh MD Unavailable         Unavailable

 

                    EscobarHugh MD Unavailable         Unavailable

 

                    Hugh Escobar MD Unavailable         Unavailable

 

                    Hugh Escobar MD Unavailable         Unavailable

 

                    Hugh Escobar MD Unavailable         Unavailable

 

                    Hugh Escobar MD Unavailable         Unavailable

 

                    EscobarHugh MD Unavailable         Unavailable

 

                    EscobarHugh MD Unavailable         Unavailable

 

                    Hugh Escobar MD Unavailable         Unavailable

 

                    EscobarHugh MD Unavailable         Unavailable

 

                    Hugh Escobar MD Unavailable         Unavailable

 

                    Hugh Escobar MD Unavailable         Unavailable

 

                    Hugh Escobar MD Unavailable         Unavailable

 

                    Hugh Escobar MD Unavailable         Unavailable

 

                    Hugh Escobar MD Unavailable         Unavailable

 

                    EscobarHugh MD Unavailable         Unavailable

 

                    EscobarHugh MD Unavailable         Unavailable

 

                    Hugh Escobar MD Unavailable         Unavailable

 

                    EscobarHugh MD Unavailable         Unavailable

 

                    Hugh Escobar MD Unavailable         Unavailable

 

                    Hugh Escobar MD Unavailable         Unavailable

 

                    Hugh Escobar MD Unavailable         Unavailable

 

                    Hugh Escobar MD Unavailable         Unavailable

 

                    Hugh Escobar MD Unavailable         Unavailable

 

                    Hugh Escobar MD Unavailable         Unavailable

 

                    EscobarHugh MD Unavailable         Unavailable

 

                    EscobarHugh MD Unavailable         Unavailable

 

                    Hugh Escobar MD Unavailable         Unavailable

 

                    EscobarHugh MD Unavailable         Unavailable

 

                    Hugh Escobar MD Unavailable         Unavailable

 

                    Hugh Escobar MD Unavailable         Unavailable

 

                    Hugh Escobar MD Unavailable         Unavailable

 

                    Hugh Escobar MD Unavailable         Unavailable

 

                    Hugh Escobar MD Unavailable         Unavailable

 

                    Hugh Escobar MD Unavailable         Unavailable

 

                    EscobarHugh MD Unavailable         Unavailable



                                  



Re-disclosure Warning

          The records that you are about to access may contain information from 
federally-assisted alcohol or drug abuse programs. If such information is 
present, then the following federally mandated warning applies: This information
has been disclosed to you from records protected by federal confidentiality 
rules (42 CFR part 2). The federal rules prohibit you from making any further 
disclosure of this information unless further disclosure is expressly permitted 
by the written consent of the person to whom it pertains or as otherwise 
permitted by 42 CFR part 2. A general authorization for the release of medical 
or other information is NOT sufficient for this purpose. The Federal rules 
restrict any use of the information to criminally investigate or prosecute any 
alcohol or drug abuse patient.The records that you are about to access may 
contain highly sensitive health information, the redisclosure of which is 
protected by Article 27-F of the [facility] Public Health law. If you 
continue you may have access to information: Regarding HIV / AIDS; Provided by 
facilities licensed or operated by the [facility] Office of Mental Health; 
or Provided by the [facility] Office for People With Developmental 
Disabilities. If such information is present, then the following [place] 
mandated warning applies: This information has been disclosed to you from 
confidential records which are protected by state law. State law prohibits you 
from making any further disclosure of this information without the specific 
written consent of the person to whom it pertains, or as otherwise permitted by 
law. Any unauthorized further disclosure in violation of state law may result in
a fine or long term sentence or both. A general authorization for the release of 
medical or other information is NOT sufficient authorization for further disc
losure.                                                                         
    



Family History

          



             Family Member Name Family Member Gender Family Member Status Date o

f Status 

Description                             Data Source(s)

 

           Unknown    Male       Problem                          [facility] ([facility]

 

           Unknown    Male       Problem                          [facility] ([facility]

 

                                        () 

 

           Unknown    Male       Problem                          [facility] ([facility]

 

                                        () 



                                                                                
                           



Encounters

          



           Encounter  Providers  Location   Date       Indications Data Source(s

)

 

                Outpatient      Attender: Irvin [place] Office 10/

2021 09:15:00 AM 

EDT                                                 [facility]

 

                Outpatient      Attender: Irvin [place] [facility] 2021 01:00:00 PM 

EDT                                                 [facility] ([facility]

 

                Outpatient      Attender: Daniel Cazares/[place]

eindl 2021 

01:00:00 PM EDT                                     [facility]

 

                ([facility]yst[facility]) Urology                 [address] [place] [address] 2021 

12:00:00 AM EDT                                     [facility] ([facility])

 

                Unknown                         [address] [facility] [address] 2021 12:00:00 AM 

EDT                                                 eCW1 ([facility])

 

                Outpatient                      [address] [facility] [address] [address] 2021 12:00:00 AM

EDT                                                 e[facility]1 ([facility])

 

                Outpatient      Attender: Irvin [place]ANT[place] [facility]  01:15:00 PM 

EDT                                                 [facility] ([facility]

 

                Outpatient      Attender: Leo Gosselin JR [place]

dl 2021 

08:55:00 AM EST                                     [facility]

 

                Outpatient      Attender: Irvin SANTIAGO [place] [facility]  12:15:00 PM 

EST                                                 [facility]

 

                Outpatient      Attender: Daniel Cazares/[place]/R

eindl 2020 

10:00:00 AM EST                                     [facility]

 

                Office Visit    Attender: Leo Gosselin JR [place]

dl 2020 

08:10:00 AM EST                                     [facility]

 

                Office Visit    Attender: Pacheco Cazares/[place]

ndl 10/20/2020 

10:30:00 AM EDT                                     [facility]

 

                Outpatient      Attender: Irvin SANTIAGO [place] [facility]  01:30:00 PM 

EDT                                                 [facility] ([facility]



                                                                                
                                                                                
                                              



Immunizations

          



             Vaccine      Date         Status       Description  Data Source(s)

 

             COVID-19 VACCINE Moderna 10/28/2021 12:00:00 AM EDT completed      

           [facility]

 

                                        Vaccine Series Complete: YESThis Data wa

s Submitted to [facility] Via [employer]. 

 

             New in .  IIV4 10/05/2021 09:06:00 AM EDT completed            

     [facility] ([facility]

 

             COVID-19 VACCINE Moderna 2021 12:00:00 AM EST completed      

           [facility]

 

                                        Vaccine Series Complete: YESThis Data wa

s Submitted to [facility] Via [employer]. 

 

             COVID-19 VACCINE Moderna 2020 12:00:00 AM EST completed      

           [facility]

 

                                        Vaccine Series Complete: NOThis Data was

 Submitted to [facility] Via [employer]. 

 

             New in .  IIV4 2020 01:38:00 PM EDT completed            

     [facility] ([facility]

 

                          INFLUENZA VIRUS VACCINE QUADRIVALENT - (6 MOS AN

D UP) 2020 12:00:00 

AM EDT              completed                               [facility]



                                                                                
                                                         



Medications

          



          Medication Brand Name Start Date Product Form Dose      Route     Admi

nistrative 

Instructions Pharmacy Instructions Status     Indications Reaction   Description

 Data 

Source(s)

 

          0.6 mg              2021 12:00:00 AM EDT tablet    10           

       2 TABLETS BY MOUTH FOR THE 1ST 

DOSE, THEN 1 AFTER 1 HOUR, THEN 1 EVERY DAY FOR 7 DAYS AS NEEDED FOR GOUT 2 

TABLETS BY MOUTH FOR THE 1ST DOSE, THEN 1 AFTER 1 HOUR, THEN 1 EVERY DAY FOR 7 
DAYS AS NEEDED FOR GOUT SOLD: 10/04/2021                                        

[facility]

 

          90 mcg/actuation           2021 12:00:00 AM EDT HFA aerosol inha

ler 8                   INHALE TWO

PUFFS BY MOUTH FOUR TIMES A DAY AS NEEDED INHALE TWO PUFFS BY MOUTH FOUR TIMES A

DAY AS NEEDED SOLD: 2021                                        [facility]

 

          90 mcg/actuation           2021 12:00:00 AM EDT HFA aerosol inha

ler 8                   INHALE TWO

PUFFS BY MOUTH FOUR TIMES A DAY AS NEEDED INHALE TWO PUFFS BY MOUTH FOUR TIMES A

DAY AS NEEDED SOLD: 2021                                        [facility]

 

          10 mg               2021 12:00:00 AM EDT tablet    40           

       TAKE 4 TABLETS BY MOUTH ONCE 

DAILY FOR 4 DAYS THEN 3 ONCE DAILY FOR 4 DAYS THEN 2 TABLETS BY MOUTH EVERY DAY 
FOR 4 DAYS THEN 1 TABLET BY MOUTH EVERY DAY FOR 4 DAYS AND STOP TAKE 4 TABLETS 

BY MOUTH ONCE DAILY FOR 4 DAYS THEN 3 ONCE DAILY FOR 4 DAYS THEN 2 TABLETS BY 
MOUTH EVERY DAY FOR 4 DAYS THEN 1 TABLET BY MOUTH EVERY DAY FOR 4 DAYS AND STOP 

SOLD: 2021                                                 [facility]

 

        Prednisone 10 MG Oral Tablet Prednisone 2021 12:00:00 AM EDT      

           ORAL                    

active                                                          MEDENT ([facility], )

 

          1.5 mg/0.5 mL           2021 12:00:00 AM EDT pen injector 2     

              INJECT 1 

SUBCUTANEOUSLY ONCE A WEEK INJECT 1 SUBCUTANEOUSLY ONCE A WEEK SOLD: 2021 

   

                                                            [facility]

 

          1.5 mg/0.5 mL           2021 12:00:00 AM EDT pen injector 2     

              INJECT 1 

SUBCUTANEOUSLY ONCE A WEEK INJECT 1 SUBCUTANEOUSLY ONCE A WEEK SOLD: 2021 

   

                                                            [facility]

 

          1.5 mg/0.5 mL           2021 12:00:00 AM EDT pen injector 2     

              INJECT 1 

SUBCUTANEOUSLY ONCE A WEEK INJECT 1 SUBCUTANEOUSLY ONCE A WEEK SOLD: 10/07/2021 

   

                                                            [facility]

 

          1.5 mg/0.5 mL           2021 12:00:00 AM EDT pen injector 2     

              INJECT 1 

SUBCUTANEOUSLY ONCE A WEEK INJECT 1 SUBCUTANEOUSLY ONCE A WEEK SOLD: 2021 

   

                                                            [facility]

 

                    vardenafil 20 MG Oral Tablet Vardenafil HCl 20 MG Vardenafil

 HCl 20 MG 

2021 12:00:00 AM EDT                                    active            

   Vardenafil HCl 20 MG eCW1 

([facility])

 

                    vardenafil 20 MG Oral Tablet Vardenafil HCl 20 MG Vardenafil

 HCl 20 MG 

2021 12:00:00 AM EDT                                    active            

   Vardenafil HCl 20 MG eCW1 

([facility])

 

          20 mg               2021 12:00:00 AM EDT tablet    6            

       TAKE 1 TABLET BY MOUTH 60 MINUTES 

BEFORE SEXUAL ACTIVITY AS NEEDED        TAKE 1 TABLET BY MOUTH 60 MINUTES BEFORE

 SEXUAL

ACTIVITY AS NEEDED SOLD: 10/18/2021                                        [facility]

 

                    vardenafil 20 MG Oral Tablet Vardenafil HCl 20 MG Vardenafil

 HCl 20 MG 

2021 12:00:00 AM EDT                                    active            

   Vardenafil HCl 20 MG eCW1 

([facility])

 

                    vardenafil 20 MG Oral Tablet Vardenafil HCl 20 MG Vardenafil

 HCl 20 MG 

2021 12:00:00 AM EDT                                    active            

   Vardenafil HCl 20 MG eCW1 

([facility])

 

          20 mg               2021 12:00:00 AM EDT tablet    6            

       TAKE 1 TABLET BY MOUTH 60 MINUTES 

BEFORE SEXUAL ACTIVITY AS NEEDED        TAKE 1 TABLET BY MOUTH 60 MINUTES BEFORE

 SEXUAL

ACTIVITY AS NEEDED SOLD: 2021                                        [facility]

 

          0.5 mg (11)- 1 mg (42)           2021 12:00:00 AM EDT tablets,do

se pack 53                  TAKE 

BY MOUTH AS DIRECTED TAKE BY MOUTH AS DIRECTED SOLD: 2021                 

                 [facility]

 

          90 mcg/actuation           2021 12:00:00 AM EDT HFA aerosol inha

ler 8                   INHALE TWO

PUFFS BY MOUTH FOUR TIMES A DAY AS NEEDED INHALE TWO PUFFS BY MOUTH FOUR TIMES A

DAY AS NEEDED SOLD: 2021                                        [facility]

 

          90 mcg/actuation           2021 12:00:00 AM EDT HFA aerosol inha

ler 8                   INHALE TWO

PUFFS BY MOUTH FOUR TIMES A DAY AS NEEDED INHALE TWO PUFFS BY MOUTH FOUR TIMES A

DAY AS NEEDED SOLD: 2021                                        [facility]

 

          90 mcg/actuation           2021 12:00:00 AM EDT HFA aerosol inha

ler 8                   INHALE TWO

PUFFS BY MOUTH FOUR TIMES A DAY AS NEEDED INHALE TWO PUFFS BY MOUTH FOUR TIMES A

DAY AS NEEDED SOLD: 2021                                        [facility]

 

          90 mcg/actuation           2021 12:00:00 AM EDT HFA aerosol inha

ler 8                   INHALE TWO

PUFFS BY MOUTH FOUR TIMES A DAY AS NEEDED INHALE TWO PUFFS BY MOUTH FOUR TIMES A

DAY AS NEEDED SOLD: 2021                                        [facility]

 

          90 mcg/actuation           2021 12:00:00 AM EDT HFA aerosol inha

ler 8                   INHALE TWO

PUFFS BY MOUTH FOUR TIMES A DAY AS NEEDED INHALE TWO PUFFS BY MOUTH FOUR TIMES A

DAY AS NEEDED SOLD: 08/10/2021                                        [facility]

 

          90 mcg/actuation           2021 12:00:00 AM EDT HFA aerosol inha

ler 8                   INHALE TWO

PUFFS BY MOUTH FOUR TIMES A DAY AS NEEDED INHALE TWO PUFFS BY MOUTH FOUR TIMES A

DAY AS NEEDED SOLD: 2021                                        [facility]

 

             Moderna Covid-19 Vaccine Moderna Covid-19 Vaccine 2021 12:00:

00 AM EDT               

                                active                          MED[facility] ([facility]

 

          800-160 mg           2021 12:00:00 AM EDT tablet    20          

        TAKE ONE TABLET BY MOUTH 

TWICE A DAY FOR 10 DAYS   TAKE ONE TABLET BY MOUTH TWICE A DAY FOR 10 DAYS SOLD:

 

2021                                                      [facility]

 

                          Sulfamethoxazole 800 MG / Trimethoprim 160 MG Oral Tab

let 

Sulfamethoxazole/Trimethoprim DS 2021 12:00:00 AM EDT                     

ORAL                          

completed                                                       [facility] ([facility]

 

                    60 ACTUAT Albuterol 0.09 MG/ACTUAT Metered Dose Inhaler Albu

terol Sulfate HFA 

2021 12:00:00 AM EDT               RESPIRATORY               active       

               MED[facility] ([facility], )

 

          0.05 %              2021 12:00:00 AM EST cream     15           

       APPLY TO AFFECTED AREA(S) ON FACE

SPARINGLY TWO TIMES A DAY FOR 2 DAYS THEN AS NEEDED FOR SCALING APPLY TO 

AFFECTED AREA(S) ON FACE SPARINGLY TWO TIMES A DAY FOR 2 DAYS THEN AS NEEDED FOR
SCALING      SOLD: 2021                                        [facility]

[facility]

 

          50 mcg/actuation           2021 12:00:00 AM EST spray,suspension

 48                  SPRAY TWO 

SPRAYS IN EACH NOSTRIL EVERY DAY SPRAY TWO SPRAYS IN EACH NOSTRIL EVERY DAY 

SOLD: 2021                                                 [facility]

 

          Flonase Allergy Relief Flonase Allergy Relief 2021 12:00:00 AM E

ST                                

                      active                                      MED[facility] ([facility]

 

          100 mg              2020 12:00:00 AM EST tablet    12           

       TAKE ONE TABLET BY MOUTH EVERY 

DAY AS NEEDED TAKE ONE TABLET BY MOUTH EVERY DAY AS NEEDED SOLD: 2021     

                      

                                        [facility]

 

          100 mg              2020 12:00:00 AM EST tablet    12           

       TAKE ONE TABLET BY MOUTH EVERY 

DAY AS NEEDED TAKE ONE TABLET BY MOUTH EVERY DAY AS NEEDED SOLD: 2021     

                      

                                        [facility]

 

          100 mg              2020 12:00:00 AM EST tablet    12           

       TAKE ONE TABLET BY MOUTH EVERY 

DAY AS NEEDED TAKE ONE TABLET BY MOUTH EVERY DAY AS NEEDED SOLD: 2021     

                      

                                        [facility]

 

          100 mg              2020 12:00:00 AM EST tablet    12           

       TAKE ONE TABLET BY MOUTH EVERY 

DAY AS NEEDED TAKE ONE TABLET BY MOUTH EVERY DAY AS NEEDED SOLD: 10/04/2021     

                      

                                        [facility]

 

           sildenafil 100 MG Oral Tablet [Viagra] Viagra     2020 12:00:00

 AM EST                       

ORAL                          active                                  [facility] ([facility]

 

          17.5-3.13-1.6 gram           2020 12:00:00 AM EST recon soln 354

                 USE AS DIRECTED

 BY PHYSICIAN USE AS DIRECTED BY PHYSICIAN SOLD: 2021                     

             [facility]

 

        Suprep Bowel Prep Kit Suprep Bowel Prep Kit 2020 12:00:00 AM EST  

                                

             completed                                           [facility] ([facility]

 

          1.5 mg/0.5 mL           2020 12:00:00 AM EST pen injector 2     

              INJECT 1 

SUBCUTANEOUSLY ONCE A WEEK INJECT 1 SUBCUTANEOUSLY ONCE A WEEK SOLD: 2021 

   

                                                            [facility]

 

          1.5 mg/0.5 mL           2020 12:00:00 AM EST pen injector 2     

              INJECT 1 

SUBCUTANEOUSLY ONCE A WEEK INJECT 1 SUBCUTANEOUSLY ONCE A WEEK SOLD: 2021 

   

                                                            [facility]

 

          1.5 mg/0.5 mL           2020 12:00:00 AM EST pen injector 2     

              INJECT 1 

SUBCUTANEOUSLY ONCE A WEEK INJECT 1 SUBCUTANEOUSLY ONCE A WEEK SOLD: 2020 

   

                                                            [facility]

 

          1.5 mg/0.5 mL           2020 12:00:00 AM EST pen injector 2     

              INJECT 1 

SUBCUTANEOUSLY ONCE A WEEK INJECT 1 SUBCUTANEOUSLY ONCE A WEEK SOLD: 2021 

   

                                                            [facility]

 

          1.5 mg/0.5 mL           2020 12:00:00 AM EST pen injector 2     

              INJECT 1 

SUBCUTANEOUSLY ONCE A WEEK INJECT 1 SUBCUTANEOUSLY ONCE A WEEK SOLD: 2021 

   

                                                            [facility]

 

          90 mcg/actuation           10/20/2020 12:00:00 AM EDT HFA aerosol inha

ler 8                   INHALE TWO

 PUFFS BY MOUTH FOUR TIMES A DAY AS NEEDED INHALE TWO PUFFS BY MOUTH FOUR TIMES 

A DAY AS NEEDED SOLD: 10/22/2020                                        [facility]

 

          90 mcg/actuation           10/20/2020 12:00:00 AM EDT HFA aerosol inha

ler 8                   INHALE TWO

 PUFFS BY MOUTH FOUR TIMES A DAY AS NEEDED INHALE TWO PUFFS BY MOUTH FOUR TIMES 

A DAY AS NEEDED SOLD: 2020                                        [facility]

 

          90 mcg/actuation           10/20/2020 12:00:00 AM EDT HFA aerosol inha

ler 8                   INHALE TWO

 PUFFS BY MOUTH FOUR TIMES A DAY AS NEEDED INHALE TWO PUFFS BY MOUTH FOUR TIMES 

A DAY AS NEEDED SOLD: 2021                                        [facility]

 

          90 mcg/actuation           10/20/2020 12:00:00 AM EDT HFA aerosol inha

ler 8                   INHALE TWO

 PUFFS BY MOUTH FOUR TIMES A DAY AS NEEDED INHALE TWO PUFFS BY MOUTH FOUR TIMES 

A DAY AS NEEDED SOLD: 2020                                        [facility]

 

          90 mcg/actuation           10/20/2020 12:00:00 AM EDT HFA aerosol inha

ler 8                   INHALE TWO

 PUFFS BY MOUTH FOUR TIMES A DAY AS NEEDED INHALE TWO PUFFS BY MOUTH FOUR TIMES 

A DAY AS NEEDED SOLD: 2021                                        [facility]

 

          90 mcg/actuation           10/20/2020 12:00:00 AM EDT HFA aerosol inha

ler 8                   INHALE TWO

 PUFFS BY MOUTH FOUR TIMES A DAY AS NEEDED INHALE TWO PUFFS BY MOUTH FOUR TIMES 

A DAY AS NEEDED SOLD: 2021                                        [facility]

 

          160-4.5 mcg/actuation           10/15/2020 12:00:00 AM EDT HFA aerosol

 inhaler 10                  

INHALE TWO PUFFS BY MOUTH TWICE A DAY INHALE TWO PUFFS BY MOUTH TWICE A DAY 

SOLD: 10/17/2020                                                 [facility]

 

          5-325 mg            10/06/2020 12:00:00 AM EDT tablet    30           

       TAKE ONE TO TWO TABLETS BY 

MOUTH EVERY 6 HOURS AS NEEDED FOR PAIN MAXIMUM DAILY DOSE = 6 TABLETS TAKE ONE 

TO TWO TABLETS BY MOUTH EVERY 6 HOURS AS NEEDED FOR PAIN MAXIMUM DAILY DOSE = 6 
TABLETS      SOLD: 10/06/2020                                        [facility]

[facility]

 

                          Acetaminophen 325 MG / Oxycodone Hydrochloride 5 MG Or

al Tablet [Percocet] 

Percocet 10/06/2020 12:00:00 AM EDT             ORAL              completed     

              [facility] 

([facility], )

 

          0.5 mg (11)- 1 mg (42)           2020 12:00:00 AM EDT tablets,do

se pack 53                  TAKE 

BY MOUTH PER PACKAGE INSTRUCTIONS, CALL OFFICE FOR REFILLS TAKE BY MOUTH PER 

PACKAGE INSTRUCTIONS, CALL OFFICE FOR REFILLS SOLD: 10/01/2020                  

                      [facility]

 

          90 mcg/actuation           2020 12:00:00 AM EDT HFA aerosol inha

ler 8                   INHALE TWO

 PUFFS BY MOUTH FOUR TIMES A DAY AS NEEDED INHALE TWO PUFFS BY MOUTH FOUR TIMES 

A DAY AS NEEDED SOLD: 2020                                        [facility]

 

          160-4.5 mcg/actuation           2020 12:00:00 AM EDT HFA aerosol

 inhaler 10                  

INHALE TWO PUFFS BY MOUTH TWICE A DAY INHALE TWO PUFFS BY MOUTH TWICE A DAY 

SOLD: 2020                                                 [facility]

 

          1.5 mg/0.5 mL           2020 12:00:00 AM EDT pen injector 2     

              INJECT 1 

SUBCUTANEOUSLY ONCE A WEEK INJECT 1 SUBCUTANEOUSLY ONCE A WEEK SOLD: 10/01/2020 

   

                                                            [facility]



                                                                                
                                                                                
                                                                                
                                                                                
                                                                                
                                                                                
                                                                                
                                      



Insurance Providers

          



             Payer name   Policy type / Coverage type Policy ID    Covered party

 ID Covered 

party's relationship to redd Policy Redd             Plan Information

 

                [place] Plan-[employer] Medigap Part B  [id number]       

[id number] Self                                    8

[id number]

 

                [place] Plan-[employer] Medigap Part B  [id number]       

[id number] Self                                    8

[id number]

 

                [place] Plan-[employer] Medigap Part B  [id number]       

[id number] Self                                    8

[id number]

 

                Medicare [facility]/[employer] Medicare Primary [id number]      

[id number] Self                                    

[id number]

 

          MEDICARE            [id number]           SP                  [id number]

A

 

                Medicare  [place]  Medicare Primary [id number]      .[id number]

[id number]

                    Self                                    [id number]

 

          Medicare  C         [id number]           SELF                [id number]

A

 

                Medicare [facility] Medicare Primary [id number]     

[id number] Self                                    

[id number]

 

          MEDICARE            [id number]           Caty                 [id number]

A

 

          [facility] MEDICARE DIVISION           [id number]           S            

       [id number]

 

          [place]ARE - [place]           [id number]           S                  

 [id number]

 

          MEDICARE  A         [id number]           Self                [id number]

A

 

          [place] Plan F         [id number]           SELF                [id number]

4

 

          [employer] Blue Shield P         [id number]           SPOUSE        

      [id number]

 

          MEDICARE            [id number]           SP                  [id number]

A

 

          DME Jurisdiction A [facility] C         [id number]           SELF           

     [id number]

 

          BCBS EMPIRE GR[place] DIV           [id number]           SP            

      [id number]

 

          [employer]           [id number]           SP                  89

[id number]

 

          [employer]BS           [id number]           Caty                 YLS

[id number]

 

          [employer] HE[employer]ARE           [id number]           SP                  89

[id number]

 

          [employer]ARE           [id number]           S                   89

[id number]

 

          BCBS EMPIRE           [id number]           S                   [id number]

[id number]

 

          [employer]ARE           [id number]           SP                  89

[id number]

 

          [employer]BS EMPIRE GROVER DIV           [id number]           SP            

      [id number]

 

          [facility] PLAN C U         [id number]           Self                [id number]

[id number]

 

          [employer]ARE           [id number]           SP                  89

[id number]

 

          [employer]BS EMPIRE GROVER DIV           [id number]           SP            

      [id number]

 

          BCBS EMPIRE GROVER DIV           [id number]           SP            

      [id number]

 

          [employer]ARE           [id number]           SP                  89

[id number]

 

          MEDICARE PART A             -O/P           [id number]           18    

              [id number]

 

          [employer] Medigap Part B           [id number]     Family Dependent     

       

 

          [employer] [facility] [place] Medigap Part B           [id number]     Self      

           

 

          Medicare [place] Medicare Primary           [id number]     Self        

         

 

          UNITED HEALTHCARE O         [id number] S                   89

[id number]

 

          [employer] BCBS S         [id number] S                   [id number]

[id number]

 

          BLUE CROSS OTHER 1           [id number]           WI2               

  [id number]

 

          MEDICARE            [id number]           SP                  [id number]

[id number]

 

          BC/BS OF [place] O         [id number] P                   [id number]

[id number]

 

          [facility] DIV           [id number]           SP            

      [id number]

 

          [facility]           [id number]           SP                  89

[id number]

 

          [facility]           [id number]           SP                  89

[id number]

 

          MEDICARE            [id number]           SP                  [id number]

A

 

                [employer]     Health Maintenance Organization (HMO) [id number]

19    

[id number] Family Dependent                        

[id number]

 

                [employer] [facility] Part B  [id number]    

[id number] Family Dependent                        

[id number]

 

                [facility] Part B  [id number]       

[id number] Self                                    

[id number]

 

                Medicare (Part B) Medicare Primary [id number]     

[id number] Self                                    8

[id number]

 

             [place] BC/BS [facility] Part B [id number]

[id number] 

Family Dependent                                    [id number]

 

                [place] Plan-[employer] Medigap Part B  [id number]       

[id number] Self                                    0

[id number]

 

                Medicare (Part B) Medicare Primary [id number]     

[id number] Self                                    8

[id number]

 

                Medicare (Part B) Medicare Primary [id number]     

[id number] Self                                    8

[id number]

 

          MEDICARE            [id number]           SP                  [id number]

9

 

          [employer]ARE           [id number]           S                   89

[id number]

 

          BCBS EMPIRE           [id number]           S                   [id number]

[id number]

 

          [place] MED[employer] DIVISION           [id number]           S            

       [id number]

 

          MEDICARE - [place]           [id number]           S                  

 [id number]

 

          BCBS EMPIRE [employer] DIV           UNAVAILABLE                          

     UNAVAILABLE

 

             [place] BCBS  Medigap Part B [id number]

[id number] 

Family Dependent                                    [id number]

 

           [place] Plan Medigap Part B [id number]  [id number]

[id number] Self       

                                        [id number]

 

          [place] [facility] O         [id number] S                   89

[id number]

 

          MEDICARE           [id number] S                   [id number]

A

 

          [employer]BS EMPIRE GROVER DIV           [id number]           SP            

      [id number]

 

          MEDICARE            [id number]           SP                  [id number]

A

 

                Medicare [place] Medicare Primary [id number]      

[id number] Self                                    

[id number]

 

          EMPIRE ([facility]) O         [id number] S                   8

[id number]

 

          [place] [facility]           [id number]           SP                  89

[id number]

 

          [facility]  -O/P           [id number]           18     

             [id number]



                                                                                
                                                                                
                                                                                
                                                                                
                                                                                
                                                                                
                                                                                
                                                                                
                                                                                
                  



Problems, Conditions, and Diagnoses

          No Information                                                        
                                



Surgeries/Procedures

          



             Procedure    Description  Date         Indications  Data Source(s)

 

             OFFICE OUTPATIENT VISIT 25 MINUTES              10/05/2021 12:00:00

 AM EDT              [facility] ([facility]

 

                Capillary Blood Collection Finger, Heel, Ear Stick              

   2021 12:00:00 AM EDT 

                                        [facility] ([facility]

)

 

             OFFICE OUTPATIENT VISIT 25 MINUTES              2021 12:00:00

 AM EDWILLIE PIRES ([facility]

 

             Spirometry                2021 12:00:00 AM [address] ([facility], 

)

 

                    Medication: Lidocaine HCl 2% Jelly 5mL Intravesically       

              2021 12:00:00 AM 

EDT                                                 [email or web address] (formerly [facility])

 

             OFFICE OUTPATIENT VISIT 25 MINUTES              2021 12:00:00

 AM EDT              [facility] ([facility]

 

             Colonoscopy W/ Poly              2021 12:00:00 AM EST        

      [facility] ([facility], )

 

             Spirometry                2020 12:00:00 AM EST              M

EDENT ([facility], 

)

 

                Laparoscopic Incisional Hernia W/ Mesh, Reducible               

  10/06/2020 12:00:00 AM EDT  

                                        [facility] ([facility], )

 

             Electrocardiogram Complete              2020 12:00:00 AM EDT 

             [facility] ([facility]



                                                                                
                                                                                
                  



Results

          



                    ID                  Date                Data Source

 

                    [id number]         10/05/2021 09:35:00 AM EDT [facility] ([facility]









          Name      Value     Range     Interpretation Code Description Data Khloe

rce(s) Supporting 

Document(s)

 

          Trig      114 mg/dL                         [facility] ([facility]  

 

                                        CHRONIC KIDNEY DISEASE STAGING PER NKF: 

  MALE GFR INTERPRETATION:  20-49 YRS:  

   >60 mL/min  Normal 50-59 YRS:     >56 mL/min  Normal 60-69 YRS:     >49 
mL/min  Normal 70-79 YRS:     >42 mL/min  Normal 80 and above  >35 mL/min  
Normal   FEMALE GRF INTERPRETATION:  20-39 YRS:    >60 mL/min  Normal 40-49 YRS:
    >58 mL/min  Normal 50-59 YRS:    >51 mL/min  Normal 60-69 YRS:    >45 mL/min
  Normal 70-79 YRS:    >39 mL/min  Normal 80 and above >32 mL/min  
NormalCLASSIFICATION            CHOLESTEROL FOR ADULTS       
CHILDREN/ADOLESCENTS*   DESIRABLE:                <200 MG/DL                   
<170 MG/DL  BORDER-LINE HIGH RISK:    200-239 MG/DL                170-199 MG/DL
  HIGH RISK:                >240 MG/DL                   >200 MG/DL    CLASS. 
FOR PRIMARY LDL CHOL PREVENTION:        LDL CHOL-CHILD/ADOLESCENTS*   DESIRABLE:
              <130 MG/DL              <110 MG/DL  BORDERLINE-HIGH RISK:   130-
159 MG/DL           110-129 MG/DL  HIGH RISK:              >160 MG/DL           
   >130 MG/DL   *CHILDREN AND ADOLESCENTS REPRESENTS INDIVIDUALA AGED 2-19 YEARS
 EXCLUSIVE. 

 

           Chol       208 mg/dL  0-200      Above high normal            [facility] 

([facility]                                    

 

                                        CHRONIC KIDNEY DISEASE STAGING PER NKF: 

  MALE GFR INTERPRETATION:  20-49 YRS:  

   >60 mL/min  Normal 50-59 YRS:     >56 mL/min  Normal 60-69 YRS:     >49 
mL/min  Normal 70-79 YRS:     >42 mL/min  Normal 80 and above  >35 mL/min  
Normal   FEMALE GRF INTERPRETATION:  20-39 YRS:    >60 mL/min  Normal 40-49 YRS:
    >58 mL/min  Normal 50-59 YRS:    >51 mL/min  Normal 60-69 YRS:    >45 mL/min
  Normal 70-79 YRS:    >39 mL/min  Normal 80 and above >32 mL/min  
NormalCLASSIFICATION            CHOLESTEROL FOR ADULTS       
CHILDREN/ADOLESCENTS*   DESIRABLE:                <200 MG/DL                   
<170 MG/DL  BORDER-LINE HIGH RISK:    200-239 MG/DL                170-199 MG/DL
  HIGH RISK:                >240 MG/DL                   >200 MG/DL    CLASS. 
FOR PRIMARY LDL CHOL PREVENTION:        LDL CHOL-CHILD/ADOLESCENTS*   DESIRABLE:
              <130 MG/DL              <110 MG/DL  BORDERLINE-HIGH RISK:   130-
159 MG/DL           110-129 MG/DL  HIGH RISK:              >160 MG/DL           
   >130 MG/DL   *CHILDREN AND ADOLESCENTS REPRESENTS INDIVIDUALA AGED 2-19 YEARS
 EXCLUSIVE. 

 

           LDL_C      145 Calc        Above high normal            [facility] 

([facility]                                    

 

                                        CHRONIC KIDNEY DISEASE STAGING PER NKF: 

  MALE GFR INTERPRETATION:  20-49 YRS:  

   >60 mL/min  Normal 50-59 YRS:     >56 mL/min  Normal 60-69 YRS:     >49 
mL/min  Normal 70-79 YRS:     >42 mL/min  Normal 80 and above  >35 mL/min  
Normal   FEMALE GRF INTERPRETATION:  20-39 YRS:    >60 mL/min  Normal 40-49 YRS:
    >58 mL/min  Normal 50-59 YRS:    >51 mL/min  Normal 60-69 YRS:    >45 mL/min
  Normal 70-79 YRS:    >39 mL/min  Normal 80 and above >32 mL/min  
NormalCLASSIFICATION            CHOLESTEROL FOR ADULTS       
CHILDREN/ADOLESCENTS*   DESIRABLE:                <200 MG/DL                   
<170 MG/DL  BORDER-LINE HIGH RISK:    200-239 MG/DL                170-199 MG/DL
  HIGH RISK:                >240 MG/DL                   >200 MG/DL    CLASS. 
FOR PRIMARY LDL CHOL PREVENTION:        LDL CHOL-CHILD/ADOLESCENTS*   DESIRABLE:
              <130 MG/DL              <110 MG/DL  BORDERLINE-HIGH RISK:   130-
159 MG/DL           110-129 MG/DL  HIGH RISK:              >160 MG/DL           
   >130 MG/DL   *CHILDREN AND ADOLESCENTS REPRESENTS INDIVIDUALA AGED 2-19 YEARS
 EXCLUSIVE. 

 

          Cho/HDL Ratio 5.3 CALC                                [facility] ([facility]  

 

                                        CHRONIC KIDNEY DISEASE STAGING PER NKF: 

  MALE GFR INTERPRETATION:  20-49 YRS:  

   >60 mL/min  Normal 50-59 YRS:     >56 mL/min  Normal 60-69 YRS:     >49 
mL/min  Normal 70-79 YRS:     >42 mL/min  Normal 80 and above  >35 mL/min  
Normal   FEMALE GRF INTERPRETATION:  20-39 YRS:    >60 mL/min  Normal 40-49 YRS:
    >58 mL/min  Normal 50-59 YRS:    >51 mL/min  Normal 60-69 YRS:    >45 mL/min
  Normal 70-79 YRS:    >39 mL/min  Normal 80 and above >32 mL/min  
NormalCLASSIFICATION            CHOLESTEROL FOR ADULTS       
CHILDREN/ADOLESCENTS*   DESIRABLE:                <200 MG/DL                   
<170 MG/DL  BORDER-LINE HIGH RISK:    200-239 MG/DL                170-199 MG/DL
  HIGH RISK:                >240 MG/DL                   >200 MG/DL    CLASS. 
FOR PRIMARY LDL CHOL PREVENTION:        LDL CHOL-CHILD/ADOLESCENTS*   DESIRABLE:
              <130 MG/DL              <110 MG/DL  BORDERLINE-HIGH RISK:   130-
159 MG/DL           110-129 MG/DL  HIGH RISK:              >160 MG/DL           
   >130 MG/DL   *CHILDREN AND ADOLESCENTS REPRESENTS INDIVIDUALA AGED 2-19 YEARS
 EXCLUSIVE. 

 

           Cholesterol in HDL [Mass/volume] in Serum or Plasma 40 mg/dL   35-55 

                           [facility] 

([facility]       

 

                                        CHRONIC KIDNEY DISEASE STAGING PER NKF: 

  MALE GFR INTERPRETATION:  20-49 YRS:  

   >60 mL/min  Normal 50-59 YRS:     >56 mL/min  Normal 60-69 YRS:     >49 
mL/min  Normal 70-79 YRS:     >42 mL/min  Normal 80 and above  >35 mL/min  
Normal   FEMALE GRF INTERPRETATION:  20-39 YRS:    >60 mL/min  Normal 40-49 YRS:
    >58 mL/min  Normal 50-59 YRS:    >51 mL/min  Normal 60-69 YRS:    >45 mL/min
  Normal 70-79 YRS:    >39 mL/min  Normal 80 and above >32 mL/min  
NormalCLASSIFICATION            CHOLESTEROL FOR ADULTS       
CHILDREN/ADOLESCENTS*   DESIRABLE:                <200 MG/DL                   
<170 MG/DL  BORDER-LINE HIGH RISK:    200-239 MG/DL                170-199 MG/DL
  HIGH RISK:                >240 MG/DL                   >200 MG/DL    CLASS. 
FOR PRIMARY LDL CHOL PREVENTION:        LDL CHOL-CHILD/ADOLESCENTS*   DESIRABLE:
              <130 MG/DL              <110 MG/DL  BORDERLINE-HIGH RISK:   130-
159 MG/DL           110-129 MG/DL  HIGH RISK:              >160 MG/DL           
   >130 MG/DL   *CHILDREN AND ADOLESCENTS REPRESENTS INDIVIDUALA AGED 2-19 YEARS
 EXCLUSIVE. 









                    ID                  Date                Data Source

 

                    [id number]         10/05/2021 09:35:00 AM EDT [facility] ([facility]

[facility]









          Name      Value     Range     Interpretation Code Description Data Khloe

rce(s) Supporting 

Document(s)

 

          Glu       102 mg/dL                         [facility] ([facility]  

 

                                        CHRONIC KIDNEY DISEASE STAGING PER NKF: 

  MALE GFR INTERPRETATION:  20-49 YRS:  

   >60 mL/min  Normal 50-59 YRS:     >56 mL/min  Normal 60-69 YRS:     >49 
mL/min  Normal 70-79 YRS:     >42 mL/min  Normal 80 and above  >35 mL/min  
Normal   FEMALE GRF INTERPRETATION:  20-39 YRS:    >60 mL/min  Normal 40-49 YRS:
    >58 mL/min  Normal 50-59 YRS:    >51 mL/min  Normal 60-69 YRS:    >45 mL/min
  Normal 70-79 YRS:    >39 mL/min  Normal 80 and above >32 mL/min  
NormalCLASSIFICATION            CHOLESTEROL FOR ADULTS       
CHILDREN/ADOLESCENTS*   DESIRABLE:                <200 MG/DL                   
<170 MG/DL  BORDER-LINE HIGH RISK:    200-239 MG/DL                170-199 MG/DL
  HIGH RISK:                >240 MG/DL                   >200 MG/DL    CLASS. 
FOR PRIMARY LDL CHOL PREVENTION:        LDL CHOL-CHILD/ADOLESCENTS*   DESIRABLE:
              <130 MG/DL              <110 MG/DL  BORDERLINE-HIGH RISK:   130-
159 MG/DL           110-129 MG/DL  HIGH RISK:              >160 MG/DL           
   >130 MG/DL   *CHILDREN AND ADOLESCENTS REPRESENTS INDIVIDUALA AGED 2-19 YEARS
 EXCLUSIVE. 

 

          BUN       22 mg/dL  8-23                          [facility] ([facility]

[facility]  

 

                                        CHRONIC KIDNEY DISEASE STAGING PER NKF: 

  MALE GFR INTERPRETATION:  20-49 YRS:  

   >60 mL/min  Normal 50-59 YRS:     >56 mL/min  Normal 60-69 YRS:     >49 
mL/min  Normal 70-79 YRS:     >42 mL/min  Normal 80 and above  >35 mL/min  
Normal   FEMALE GRF INTERPRETATION:  20-39 YRS:    >60 mL/min  Normal 40-49 YRS:
    >58 mL/min  Normal 50-59 YRS:    >51 mL/min  Normal 60-69 YRS:    >45 mL/min
  Normal 70-79 YRS:    >39 mL/min  Normal 80 and above >32 mL/min  
NormalCLASSIFICATION            CHOLESTEROL FOR ADULTS       
CHILDREN/ADOLESCENTS*   DESIRABLE:                <200 MG/DL                   
<170 MG/DL  BORDER-LINE HIGH RISK:    200-239 MG/DL                170-199 MG/DL
  HIGH RISK:                >240 MG/DL                   >200 MG/DL    CLASS. 
FOR PRIMARY LDL CHOL PREVENTION:        LDL CHOL-CHILD/ADOLESCENTS*   DESIRABLE:
              <130 MG/DL              <110 MG/DL  BORDERLINE-HIGH RISK:   130-
159 MG/DL           110-129 MG/DL  HIGH RISK:              >160 MG/DL           
   >130 MG/DL   *CHILDREN AND ADOLESCENTS REPRESENTS INDIVIDUALA AGED 2-19 YEARS
 EXCLUSIVE. 

 

          Na        140 mmol/L 136-145                       [facility] ([facility]  

 

                                        CHRONIC KIDNEY DISEASE STAGING PER NKF: 

  MALE GFR INTERPRETATION:  20-49 YRS:  

   >60 mL/min  Normal 50-59 YRS:     >56 mL/min  Normal 60-69 YRS:     >49 
mL/min  Normal 70-79 YRS:     >42 mL/min  Normal 80 and above  >35 mL/min  
Normal   FEMALE GRF INTERPRETATION:  20-39 YRS:    >60 mL/min  Normal 40-49 YRS:
    >58 mL/min  Normal 50-59 YRS:    >51 mL/min  Normal 60-69 YRS:    >45 mL/min
  Normal 70-79 YRS:    >39 mL/min  Normal 80 and above >32 mL/min  
NormalCLASSIFICATION            CHOLESTEROL FOR ADULTS       
CHILDREN/ADOLESCENTS*   DESIRABLE:                <200 MG/DL                   
<170 MG/DL  BORDER-LINE HIGH RISK:    200-239 MG/DL                170-199 MG/DL
  HIGH RISK:                >240 MG/DL                   >200 MG/DL    CLASS. 
FOR PRIMARY LDL CHOL PREVENTION:        LDL CHOL-CHILD/ADOLESCENTS*   DESIRABLE:
              <130 MG/DL              <110 MG/DL  BORDERLINE-HIGH RISK:   130-
159 MG/DL           110-129 MG/DL  HIGH RISK:              >160 MG/DL           
   >130 MG/DL   *CHILDREN AND ADOLESCENTS REPRESENTS INDIVIDUALA AGED 2-19 YEARS
 EXCLUSIVE. 

 

          BUN/Creatinine Ratio 19.2 CALC                               [facility] ([facility]  

 

                                        CHRONIC KIDNEY DISEASE STAGING PER NKF: 

  MALE GFR INTERPRETATION:  20-49 YRS:  

   >60 mL/min  Normal 50-59 YRS:     >56 mL/min  Normal 60-69 YRS:     >49 
mL/min  Normal 70-79 YRS:     >42 mL/min  Normal 80 and above  >35 mL/min  
Normal   FEMALE GRF INTERPRETATION:  20-39 YRS:    >60 mL/min  Normal 40-49 YRS:
    >58 mL/min  Normal 50-59 YRS:    >51 mL/min  Normal 60-69 YRS:    >45 mL/min
  Normal 70-79 YRS:    >39 mL/min  Normal 80 and above >32 mL/min  
NormalCLASSIFICATION            CHOLESTEROL FOR ADULTS       
CHILDREN/ADOLESCENTS*   DESIRABLE:                <200 MG/DL                   
<170 MG/DL  BORDER-LINE HIGH RISK:    200-239 MG/DL                170-199 MG/DL
  HIGH RISK:                >240 MG/DL                   >200 MG/DL    CLASS. 
FOR PRIMARY LDL CHOL PREVENTION:        LDL CHOL-CHILD/ADOLESCENTS*   DESIRABLE:
              <130 MG/DL              <110 MG/DL  BORDERLINE-HIGH RISK:   130-
159 MG/DL           110-129 MG/DL  HIGH RISK:              >160 MG/DL           
   >130 MG/DL   *CHILDREN AND ADOLESCENTS REPRESENTS INDIVIDUALA AGED 2-19 YEARS
 EXCLUSIVE. 

 

          Creat     1.1 mg/dL 0.7-1.2                       [facility] ([facility]

[facility]  

 

                                        CHRONIC KIDNEY DISEASE STAGING PER NKF: 

  MALE GFR INTERPRETATION:  20-49 YRS:  

   >60 mL/min  Normal 50-59 YRS:     >56 mL/min  Normal 60-69 YRS:     >49 
mL/min  Normal 70-79 YRS:     >42 mL/min  Normal 80 and above  >35 mL/min  
Normal   FEMALE GRF INTERPRETATION:  20-39 YRS:    >60 mL/min  Normal 40-49 YRS:
    >58 mL/min  Normal 50-59 YRS:    >51 mL/min  Normal 60-69 YRS:    >45 mL/min
  Normal 70-79 YRS:    >39 mL/min  Normal 80 and above >32 mL/min  
NormalCLASSIFICATION            CHOLESTEROL FOR ADULTS       
CHILDREN/ADOLESCENTS*   DESIRABLE:                <200 MG/DL                   
<170 MG/DL  BORDER-LINE HIGH RISK:    200-239 MG/DL                170-199 MG/DL
  HIGH RISK:                >240 MG/DL                   >200 MG/DL    CLASS. 
FOR PRIMARY LDL CHOL PREVENTION:        LDL CHOL-CHILD/ADOLESCENTS*   DESIRABLE:
              <130 MG/DL              <110 MG/DL  BORDERLINE-HIGH RISK:   130-
159 MG/DL           110-129 MG/DL  HIGH RISK:              >160 MG/DL           
   >130 MG/DL   *CHILDREN AND ADOLESCENTS REPRESENTS INDIVIDUALA AGED 2-19 YEARS
 EXCLUSIVE. 

 

          K         4.2 mmol/L 3.5-5.1                       [facility] ([facility]  

 

                                        CHRONIC KIDNEY DISEASE STAGING PER NKF: 

  MALE GFR INTERPRETATION:  20-49 YRS:  

   >60 mL/min  Normal 50-59 YRS:     >56 mL/min  Normal 60-69 YRS:     >49 
mL/min  Normal 70-79 YRS:     >42 mL/min  Normal 80 and above  >35 mL/min  
Normal   FEMALE GRF INTERPRETATION:  20-39 YRS:    >60 mL/min  Normal 40-49 YRS:
    >58 mL/min  Normal 50-59 YRS:    >51 mL/min  Normal 60-69 YRS:    >45 mL/min
  Normal 70-79 YRS:    >39 mL/min  Normal 80 and above >32 mL/min  
NormalCLASSIFICATION            CHOLESTEROL FOR ADULTS       
CHILDREN/ADOLESCENTS*   DESIRABLE:                <200 MG/DL                   
<170 MG/DL  BORDER-LINE HIGH RISK:    200-239 MG/DL                170-199 MG/DL
  HIGH RISK:                >240 MG/DL                   >200 MG/DL    CLASS. 
FOR PRIMARY LDL CHOL PREVENTION:        LDL CHOL-CHILD/ADOLESCENTS*   DESIRABLE:
              <130 MG/DL              <110 MG/DL  BORDERLINE-HIGH RISK:   130-
159 MG/DL           110-129 MG/DL  HIGH RISK:              >160 MG/DL           
   >130 MG/DL   *CHILDREN AND ADOLESCENTS REPRESENTS INDIVIDUALA AGED 2-19 YEARS
 EXCLUSIVE. 

 

           Co2        19.6 mmol/L 22.0-29.0  Below low normal            [facility] 

([facility]                                   

 

                                        CHRONIC KIDNEY DISEASE STAGING PER NKF: 

  MALE GFR INTERPRETATION:  20-49 YRS:  

   >60 mL/min  Normal 50-59 YRS:     >56 mL/min  Normal 60-69 YRS:     >49 
mL/min  Normal 70-79 YRS:     >42 mL/min  Normal 80 and above  >35 mL/min  
Normal   FEMALE GRF INTERPRETATION:  20-39 YRS:    >60 mL/min  Normal 40-49 YRS:
    >58 mL/min  Normal 50-59 YRS:    >51 mL/min  Normal 60-69 YRS:    >45 mL/min
  Normal 70-79 YRS:    >39 mL/min  Normal 80 and above >32 mL/min  
NormalCLASSIFICATION            CHOLESTEROL FOR ADULTS       
CHILDREN/ADOLESCENTS*   DESIRABLE:                <200 MG/DL                   
<170 MG/DL  BORDER-LINE HIGH RISK:    200-239 MG/DL                170-199 MG/DL
  HIGH RISK:                >240 MG/DL                   >200 MG/DL    CLASS. 
FOR PRIMARY LDL CHOL PREVENTION:        LDL CHOL-CHILD/ADOLESCENTS*   DESIRABLE:
              <130 MG/DL              <110 MG/DL  BORDERLINE-HIGH RISK:   130-
159 MG/DL           110-129 MG/DL  HIGH RISK:              >160 MG/DL           
   >130 MG/DL   *CHILDREN AND ADOLESCENTS REPRESENTS INDIVIDUALA AGED 2-19 YEARS
 EXCLUSIVE. 

 

          CL        105.3 mmol/L 98.0-107.0                     [facility] ([facility]  

 

                                        CHRONIC KIDNEY DISEASE STAGING PER NKF: 

  MALE GFR INTERPRETATION:  20-49 YRS:  

   >60 mL/min  Normal 50-59 YRS:     >56 mL/min  Normal 60-69 YRS:     >49 
mL/min  Normal 70-79 YRS:     >42 mL/min  Normal 80 and above  >35 mL/min  
Normal   FEMALE GRF INTERPRETATION:  20-39 YRS:    >60 mL/min  Normal 40-49 YRS:
    >58 mL/min  Normal 50-59 YRS:    >51 mL/min  Normal 60-69 YRS:    >45 mL/min
  Normal 70-79 YRS:    >39 mL/min  Normal 80 and above >32 mL/min  
NormalCLASSIFICATION            CHOLESTEROL FOR ADULTS       
CHILDREN/ADOLESCENTS*   DESIRABLE:                <200 MG/DL                   
<170 MG/DL  BORDER-LINE HIGH RISK:    200-239 MG/DL                170-199 MG/DL
  HIGH RISK:                >240 MG/DL                   >200 MG/DL    CLASS. 
FOR PRIMARY LDL CHOL PREVENTION:        LDL CHOL-CHILD/ADOLESCENTS*   DESIRABLE:
              <130 MG/DL              <110 MG/DL  BORDERLINE-HIGH RISK:   130-
159 MG/DL           110-129 MG/DL  HIGH RISK:              >160 MG/DL           
   >130 MG/DL   *CHILDREN AND ADOLESCENTS REPRESENTS INDIVIDUALA AGED 2-19 YEARS
 EXCLUSIVE. 

 

           TP         6.5 g/dL   6.6-8.7    Below low normal            [facility] (

[facility]

                                         

 

                                        CHRONIC KIDNEY DISEASE STAGING PER NKF: 

  MALE GFR INTERPRETATION:  20-49 YRS:  

   >60 mL/min  Normal 50-59 YRS:     >56 mL/min  Normal 60-69 YRS:     >49 
mL/min  Normal 70-79 YRS:     >42 mL/min  Normal 80 and above  >35 mL/min  
Normal   FEMALE GRF INTERPRETATION:  20-39 YRS:    >60 mL/min  Normal 40-49 YRS:
    >58 mL/min  Normal 50-59 YRS:    >51 mL/min  Normal 60-69 YRS:    >45 mL/min
  Normal 70-79 YRS:    >39 mL/min  Normal 80 and above >32 mL/min  
NormalCLASSIFICATION            CHOLESTEROL FOR ADULTS       
CHILDREN/ADOLESCENTS*   DESIRABLE:                <200 MG/DL                   
<170 MG/DL  BORDER-LINE HIGH RISK:    200-239 MG/DL                170-199 MG/DL
  HIGH RISK:                >240 MG/DL                   >200 MG/DL    CLASS. 
FOR PRIMARY LDL CHOL PREVENTION:        LDL CHOL-CHILD/ADOLESCENTS*   DESIRABLE:
              <130 MG/DL              <110 MG/DL  BORDERLINE-HIGH RISK:   130-
159 MG/DL           110-129 MG/DL  HIGH RISK:              >160 MG/DL           
   >130 MG/DL   *CHILDREN AND ADOLESCENTS REPRESENTS INDIVIDUALA AGED 2-19 YEARS
 EXCLUSIVE. 

 

          CA        9.4 mg/dL 8.6-10.2                      [facility] ([facility]  

 

                                        CHRONIC KIDNEY DISEASE STAGING PER NKF: 

  MALE GFR INTERPRETATION:  20-49 YRS:  

   >60 mL/min  Normal 50-59 YRS:     >56 mL/min  Normal 60-69 YRS:     >49 
mL/min  Normal 70-79 YRS:     >42 mL/min  Normal 80 and above  >35 mL/min  
Normal   FEMALE GRF INTERPRETATION:  20-39 YRS:    >60 mL/min  Normal 40-49 YRS:
    >58 mL/min  Normal 50-59 YRS:    >51 mL/min  Normal 60-69 YRS:    >45 mL/min
  Normal 70-79 YRS:    >39 mL/min  Normal 80 and above >32 mL/min  
NormalCLASSIFICATION            CHOLESTEROL FOR ADULTS       
CHILDREN/ADOLESCENTS*   DESIRABLE:                <200 MG/DL                   
<170 MG/DL  BORDER-LINE HIGH RISK:    200-239 MG/DL                170-199 MG/DL
  HIGH RISK:                >240 MG/DL                   >200 MG/DL    CLASS. 
FOR PRIMARY LDL CHOL PREVENTION:        LDL CHOL-CHILD/ADOLESCENTS*   DESIRABLE:
              <130 MG/DL              <110 MG/DL  BORDERLINE-HIGH RISK:   130-
159 MG/DL           110-129 MG/DL  HIGH RISK:              >160 MG/DL           
   >130 MG/DL   *CHILDREN AND ADOLESCENTS REPRESENTS INDIVIDUALA AGED 2-19 YEARS
 EXCLUSIVE. 

 

          Alb       4.6 g/dL  3.5-5.2                       [facility] ([facility]  

 

                                        CHRONIC KIDNEY DISEASE STAGING PER NKF: 

  MALE GFR INTERPRETATION:  20-49 YRS:  

   >60 mL/min  Normal 50-59 YRS:     >56 mL/min  Normal 60-69 YRS:     >49 
mL/min  Normal 70-79 YRS:     >42 mL/min  Normal 80 and above  >35 mL/min  
Normal   FEMALE GRF INTERPRETATION:  20-39 YRS:    >60 mL/min  Normal 40-49 YRS:
    >58 mL/min  Normal 50-59 YRS:    >51 mL/min  Normal 60-69 YRS:    >45 mL/min
  Normal 70-79 YRS:    >39 mL/min  Normal 80 and above >32 mL/min  
NormalCLASSIFICATION            CHOLESTEROL FOR ADULTS       
CHILDREN/ADOLESCENTS*   DESIRABLE:                <200 MG/DL                   
<170 MG/DL  BORDER-LINE HIGH RISK:    200-239 MG/DL                170-199 MG/DL
  HIGH RISK:                >240 MG/DL                   >200 MG/DL    CLASS. 
FOR PRIMARY LDL CHOL PREVENTION:        LDL CHOL-CHILD/ADOLESCENTS*   DESIRABLE:
              <130 MG/DL              <110 MG/DL  BORDERLINE-HIGH RISK:   130-
159 MG/DL           110-129 MG/DL  HIGH RISK:              >160 MG/DL           
   >130 MG/DL   *CHILDREN AND ADOLESCENTS REPRESENTS INDIVIDUALA AGED 2-19 YEARS
 EXCLUSIVE. 

 

          Globulin  2.0 CALC                                [facility] ([facility]  

 

                                        CHRONIC KIDNEY DISEASE STAGING PER NKF: 

  MALE GFR INTERPRETATION:  20-49 YRS:  

   >60 mL/min  Normal 50-59 YRS:     >56 mL/min  Normal 60-69 YRS:     >49 
mL/min  Normal 70-79 YRS:     >42 mL/min  Normal 80 and above  >35 mL/min  
Normal   FEMALE GRF INTERPRETATION:  20-39 YRS:    >60 mL/min  Normal 40-49 YRS:
    >58 mL/min  Normal 50-59 YRS:    >51 mL/min  Normal 60-69 YRS:    >45 mL/min
  Normal 70-79 YRS:    >39 mL/min  Normal 80 and above >32 mL/min  
NormalCLASSIFICATION            CHOLESTEROL FOR ADULTS       
CHILDREN/ADOLESCENTS*   DESIRABLE:                <200 MG/DL                   
<170 MG/DL  BORDER-LINE HIGH RISK:    200-239 MG/DL                170-199 MG/DL
  HIGH RISK:                >240 MG/DL                   >200 MG/DL    CLASS. 
FOR PRIMARY LDL CHOL PREVENTION:        LDL CHOL-CHILD/ADOLESCENTS*   DESIRABLE:
              <130 MG/DL              <110 MG/DL  BORDERLINE-HIGH RISK:   130-
159 MG/DL           110-129 MG/DL  HIGH RISK:              >160 MG/DL           
   >130 MG/DL   *CHILDREN AND ADOLESCENTS REPRESENTS INDIVIDUALA AGED 2-19 YEARS
 EXCLUSIVE. 

 

          A/G Ratio 2.3 CALC                                [facility]  

 

                                        CHRONIC KIDNEY DISEASE STAGING PER NKF: 

  MALE GFR INTERPRETATION:  20-49 YRS:  

   >60 mL/min  Normal 50-59 YRS:     >56 mL/min  Normal 60-69 YRS:     >49 
mL/min  Normal 70-79 YRS:     >42 mL/min  Normal 80 and above  >35 mL/min  
Normal   FEMALE GRF INTERPRETATION:  20-39 YRS:    >60 mL/min  Normal 40-49 YRS:
    >58 mL/min  Normal 50-59 YRS:    >51 mL/min  Normal 60-69 YRS:    >45 mL/min
  Normal 70-79 YRS:    >39 mL/min  Normal 80 and above >32 mL/min  
NormalCLASSIFICATION            CHOLESTEROL FOR ADULTS       
CHILDREN/ADOLESCENTS*   DESIRABLE:                <200 MG/DL                   
<170 MG/DL  BORDER-LINE HIGH RISK:    200-239 MG/DL                170-199 MG/DL
  HIGH RISK:                >240 MG/DL                   >200 MG/DL    CLASS. 
FOR PRIMARY LDL CHOL PREVENTION:        LDL CHOL-CHILD/ADOLESCENTS*   DESIRABLE:
              <130 MG/DL              <110 MG/DL  BORDERLINE-HIGH RISK:   130-
159 MG/DL           110-129 MG/DL  HIGH RISK:              >160 MG/DL           
   >130 MG/DL   *CHILDREN AND ADOLESCENTS REPRESENTS INDIVIDUALA AGED 2-19 YEARS
 EXCLUSIVE. 

 

          Alt (SGPT) 9 U/L     0-41                          [facility] ([facility]  

 

                                        CHRONIC KIDNEY DISEASE STAGING PER NKF: 

  MALE GFR INTERPRETATION:  20-49 YRS:  

   >60 mL/min  Normal 50-59 YRS:     >56 mL/min  Normal 60-69 YRS:     >49 
mL/min  Normal 70-79 YRS:     >42 mL/min  Normal 80 and above  >35 mL/min  
Normal   FEMALE GRF INTERPRETATION:  20-39 YRS:    >60 mL/min  Normal 40-49 YRS:
    >58 mL/min  Normal 50-59 YRS:    >51 mL/min  Normal 60-69 YRS:    >45 mL/min
  Normal 70-79 YRS:    >39 mL/min  Normal 80 and above >32 mL/min  
NormalCLASSIFICATION            CHOLESTEROL FOR ADULTS       
CHILDREN/ADOLESCENTS*   DESIRABLE:                <200 MG/DL                   
<170 MG/DL  BORDER-LINE HIGH RISK:    200-239 MG/DL                170-199 MG/DL
  HIGH RISK:                >240 MG/DL                   >200 MG/DL    CLASS. 
FOR PRIMARY LDL CHOL PREVENTION:        LDL CHOL-CHILD/ADOLESCENTS*   DESIRABLE:
              <130 MG/DL              <110 MG/DL  BORDERLINE-HIGH RISK:   130-
159 MG/DL           110-129 MG/DL  HIGH RISK:              >160 MG/DL           
   >130 MG/DL   *CHILDREN AND ADOLESCENTS REPRESENTS INDIVIDUALA AGED 2-19 YEARS
 EXCLUSIVE. 

 

          Alp       87.7 U/L                          [facility] ([facility]  

 

                                        CHRONIC KIDNEY DISEASE STAGING PER NKF: 

  MALE GFR INTERPRETATION:  20-49 YRS:  

   >60 mL/min  Normal 50-59 YRS:     >56 mL/min  Normal 60-69 YRS:     >49 
mL/min  Normal 70-79 YRS:     >42 mL/min  Normal 80 and above  >35 mL/min  
Normal   FEMALE GRF INTERPRETATION:  20-39 YRS:    >60 mL/min  Normal 40-49 YRS:
    >58 mL/min  Normal 50-59 YRS:    >51 mL/min  Normal 60-69 YRS:    >45 mL/min
  Normal 70-79 YRS:    >39 mL/min  Normal 80 and above >32 mL/min  
NormalCLASSIFICATION            CHOLESTEROL FOR ADULTS       
CHILDREN/ADOLESCENTS*   DESIRABLE:                <200 MG/DL                   
<170 MG/DL  BORDER-LINE HIGH RISK:    200-239 MG/DL                170-199 MG/DL
  HIGH RISK:                >240 MG/DL                   >200 MG/DL    CLASS. 
FOR PRIMARY LDL CHOL PREVENTION:        LDL CHOL-CHILD/ADOLESCENTS*   DESIRABLE:
              <130 MG/DL              <110 MG/DL  BORDERLINE-HIGH RISK:   130-
159 MG/DL           110-129 MG/DL  HIGH RISK:              >160 MG/DL           
   >130 MG/DL   *CHILDREN AND ADOLESCENTS REPRESENTS INDIVIDUALA AGED 2-19 YEARS
 EXCLUSIVE. 

 

          Ast (Sgot) 12 U/L    0-40                          [facility] ([facility]  

 

                                        CHRONIC KIDNEY DISEASE STAGING PER NKF: 

  MALE GFR INTERPRETATION:  20-49 YRS:  

   >60 mL/min  Normal 50-59 YRS:     >56 mL/min  Normal 60-69 YRS:     >49 
mL/min  Normal 70-79 YRS:     >42 mL/min  Normal 80 and above  >35 mL/min  
Normal   FEMALE GRF INTERPRETATION:  20-39 YRS:    >60 mL/min  Normal 40-49 YRS:
    >58 mL/min  Normal 50-59 YRS:    >51 mL/min  Normal 60-69 YRS:    >45 mL/min
  Normal 70-79 YRS:    >39 mL/min  Normal 80 and above >32 mL/min  
NormalCLASSIFICATION            CHOLESTEROL FOR ADULTS       
CHILDREN/ADOLESCENTS*   DESIRABLE:                <200 MG/DL                   
<170 MG/DL  BORDER-LINE HIGH RISK:    200-239 MG/DL                170-199 MG/DL
  HIGH RISK:                >240 MG/DL                   >200 MG/DL    CLASS. 
FOR PRIMARY LDL CHOL PREVENTION:        LDL CHOL-CHILD/ADOLESCENTS*   DESIRABLE:
              <130 MG/DL              <110 MG/DL  BORDERLINE-HIGH RISK:   130-
159 MG/DL           110-129 MG/DL  HIGH RISK:              >160 MG/DL           
   >130 MG/DL   *CHILDREN AND ADOLESCENTS REPRESENTS INDIVIDUALA AGED 2-19 YEARS
 EXCLUSIVE. 

 

          Anion Gap 19 mmol/L                               [facility] ([facility]  

 

                                        CHRONIC KIDNEY DISEASE STAGING PER NKF: 

  MALE GFR INTERPRETATION:  20-49 YRS:  

   >60 mL/min  Normal 50-59 YRS:     >56 mL/min  Normal 60-69 YRS:     >49 
mL/min  Normal 70-79 YRS:     >42 mL/min  Normal 80 and above  >35 mL/min  
Normal   FEMALE GRF INTERPRETATION:  20-39 YRS:    >60 mL/min  Normal 40-49 YRS:
    >58 mL/min  Normal 50-59 YRS:    >51 mL/min  Normal 60-69 YRS:    >45 mL/min
  Normal 70-79 YRS:    >39 mL/min  Normal 80 and above >32 mL/min  
NormalCLASSIFICATION            CHOLESTEROL FOR ADULTS       
CHILDREN/ADOLESCENTS*   DESIRABLE:                <200 MG/DL                   
<170 MG/DL  BORDER-LINE HIGH RISK:    200-239 MG/DL                170-199 MG/DL
  HIGH RISK:                >240 MG/DL                   >200 MG/DL    CLASS. 
FOR PRIMARY LDL CHOL PREVENTION:        LDL CHOL-CHILD/ADOLESCENTS*   DESIRABLE:
              <130 MG/DL              <110 MG/DL  BORDERLINE-HIGH RISK:   130-
159 MG/DL           110-129 MG/DL  HIGH RISK:              >160 MG/DL           
   >130 MG/DL   *CHILDREN AND ADOLESCENTS REPRESENTS INDIVIDUALA AGED 2-19 YEARS
 EXCLUSIVE. 

 

          Tbili     0.41 mg/dL 0.0-1.2                       [facility] ([facility]  

 

                                        CHRONIC KIDNEY DISEASE STAGING PER NKF: 

  MALE GFR INTERPRETATION:  20-49 YRS:  

   >60 mL/min  Normal 50-59 YRS:     >56 mL/min  Normal 60-69 YRS:     >49 
mL/min  Normal 70-79 YRS:     >42 mL/min  Normal 80 and above  >35 mL/min  
Normal   FEMALE GRF INTERPRETATION:  20-39 YRS:    >60 mL/min  Normal 40-49 YRS:
    >58 mL/min  Normal 50-59 YRS:    >51 mL/min  Normal 60-69 YRS:    >45 mL/min
  Normal 70-79 YRS:    >39 mL/min  Normal 80 and above >32 mL/min  
NormalCLASSIFICATION            CHOLESTEROL FOR ADULTS       
CHILDREN/ADOLESCENTS*   DESIRABLE:                <200 MG/DL                   
<170 MG/DL  BORDER-LINE HIGH RISK:    200-239 MG/DL                170-199 MG/DL
  HIGH RISK:                >240 MG/DL                   >200 MG/DL    CLASS. 
FOR PRIMARY LDL CHOL PREVENTION:        LDL CHOL-CHILD/ADOLESCENTS*   DESIRABLE:
              <130 MG/DL              <110 MG/DL  BORDERLINE-HIGH RISK:   130-
159 MG/DL           110-129 MG/DL  HIGH RISK:              >160 MG/DL           
   >130 MG/DL   *CHILDREN AND ADOLESCENTS REPRESENTS INDIVIDUALA AGED 2-19 YEARS
 EXCLUSIVE. 

 

           Osmolality-Calculated 282.3 CALC                                  [facility]

[facility] ([facility]

                                         

 

                                        CHRONIC KIDNEY DISEASE STAGING PER NKF: 

  MALE GFR INTERPRETATION:  20-49 YRS:  

   >60 mL/min  Normal 50-59 YRS:     >56 mL/min  Normal 60-69 YRS:     >49 
mL/min  Normal 70-79 YRS:     >42 mL/min  Normal 80 and above  >35 mL/min  
Normal   FEMALE GRF INTERPRETATION:  20-39 YRS:    >60 mL/min  Normal 40-49 YRS:
    >58 mL/min  Normal 50-59 YRS:    >51 mL/min  Normal 60-69 YRS:    >45 mL/min
  Normal 70-79 YRS:    >39 mL/min  Normal 80 and above >32 mL/min  
NormalCLASSIFICATION            CHOLESTEROL FOR ADULTS       
CHILDREN/ADOLESCENTS*   DESIRABLE:                <200 MG/DL                   
<170 MG/DL  BORDER-LINE HIGH RISK:    200-239 MG/DL                170-199 MG/DL
  HIGH RISK:                >240 MG/DL                   >200 MG/DL    CLASS. 
FOR PRIMARY LDL CHOL PREVENTION:        LDL CHOL-CHILD/ADOLESCENTS*   DESIRABLE:
              <130 MG/DL              <110 MG/DL  BORDERLINE-HIGH RISK:   130-
159 MG/DL           110-129 MG/DL  HIGH RISK:              >160 MG/DL           
   >130 MG/DL   *CHILDREN AND ADOLESCENTS REPRESENTS INDIVIDUALA AGED 2-19 YEARS
 EXCLUSIVE. 

 

          eGFR  78 #                                    [facility] (

[facility]  

 

                                        CHRONIC KIDNEY DISEASE STAGING PER NKF: 

  MALE GFR INTERPRETATION:  20-49 YRS:  

   >60 mL/min  Normal 50-59 YRS:     >56 mL/min  Normal 60-69 YRS:     >49 
mL/min  Normal 70-79 YRS:     >42 mL/min  Normal 80 and above  >35 mL/min  
Normal   FEMALE GRF INTERPRETATION:  20-39 YRS:    >60 mL/min  Normal 40-49 YRS:
    >58 mL/min  Normal 50-59 YRS:    >51 mL/min  Normal 60-69 YRS:    >45 mL/min
  Normal 70-79 YRS:    >39 mL/min  Normal 80 and above >32 mL/min  
NormalCLASSIFICATION            CHOLESTEROL FOR ADULTS       
CHILDREN/ADOLESCENTS*   DESIRABLE:                <200 MG/DL                   
<170 MG/DL  BORDER-LINE HIGH RISK:    200-239 MG/DL                170-199 MG/DL
  HIGH RISK:                >240 MG/DL                   >200 MG/DL    CLASS. 
FOR PRIMARY LDL CHOL PREVENTION:        LDL CHOL-CHILD/ADOLESCENTS*   DESIRABLE:
              <130 MG/DL              <110 MG/DL  BORDERLINE-HIGH RISK:   130-
159 MG/DL           110-129 MG/DL  HIGH RISK:              >160 MG/DL           
   >130 MG/DL   *CHILDREN AND ADOLESCENTS REPRESENTS INDIVIDUALA AGED 2-19 YEARS
 EXCLUSIVE. 

 

          eGFR Non-Afr. American 67 #                                    [facility] 

([facility]  

 

                                        CHRONIC KIDNEY DISEASE STAGING PER NKF: 

  MALE GFR INTERPRETATION:  20-49 YRS:  

   >60 mL/min  Normal 50-59 YRS:     >56 mL/min  Normal 60-69 YRS:     >49 
mL/min  Normal 70-79 YRS:     >42 mL/min  Normal 80 and above  >35 mL/min  
Normal   FEMALE GRF INTERPRETATION:  20-39 YRS:    >60 mL/min  Normal 40-49 YRS:
    >58 mL/min  Normal 50-59 YRS:    >51 mL/min  Normal 60-69 YRS:    >45 mL/min
  Normal 70-79 YRS:    >39 mL/min  Normal 80 and above >32 mL/min  
NormalCLASSIFICATION            CHOLESTEROL FOR ADULTS       
CHILDREN/ADOLESCENTS*   DESIRABLE:                <200 MG/DL                   
<170 MG/DL  BORDER-LINE HIGH RISK:    200-239 MG/DL                170-199 MG/DL
  HIGH RISK:                >240 MG/DL                   >200 MG/DL    CLASS. 
FOR PRIMARY LDL CHOL PREVENTION:        LDL CHOL-CHILD/ADOLESCENTS*   DESIRABLE:
              <130 MG/DL              <110 MG/DL  BORDERLINE-HIGH RISK:   130-
159 MG/DL           110-129 MG/DL  HIGH RISK:              >160 MG/DL           
   >130 MG/DL   *CHILDREN AND ADOLESCENTS REPRESENTS INDIVIDUALA AGED 2-19 YEARS
 EXCLUSIVE. 









                    ID                  Date                Data Source

 

                    [id number]         10/05/2021 09:35:00 AM EDT [facility] ([facility]

[facility]









          Name      Value     Range     Interpretation Code Description Data Khloe

rce(s) Supporting 

Document(s)

 

           Hemoglobin A1c/Hemoglobin.total in Blood 5.9 %      4.50-6.20        

                [facility] ([facility]               









                    ID                  Date                Data Source

 

                    [id number]         2021 01:34:00 PM EDT [facility] ([facility]

[facility]









          Name      Value     Range     Interpretation Code Description Data Khloe

rce(s) Supporting 

Document(s)

 

           Hemoglobin A1c/Hemoglobin.total in Blood 6.3 %      4.50-6.20  Above 

high normal            

[facility] ([facility]  









                    ID                  Date                Data Source

 

                    CT ABD & Pelvis w/o FOL by SURINDER 2021 12:00:00 AM EDT eC

W1 ([facility])









          Name      Value     Range     Interpretation Code Description Data Khloe

rce(s) Supporting 

Document(s)

 

                                                  CT ABD & Pelvis w/o FOL by SURINDER

 eCW1 ([facility])  









                    ID                  Date                Data Source

 

                    [id number]         2021 09:05:00 AM EDT [facility] ([facility]

[facility]









          Name      Value     Range     Interpretation Code Description Data Khloe

rce(s) Supporting 

Document(s)

 

           Hemoglobin A1c/Hemoglobin.total in Blood 6.3 %      4.50-6.20  Above 

high normal            

[facility] ([facility]  









                    ID                  Date                Data Source

 

                    [id number]         2021 09:00:00 AM EDT [facility] ([facility]

[facility]









          Name      Value     Range     Interpretation Code Description Data Khloe

rce(s) Supporting 

Document(s)

 

           Prostate specific Ag [Mass/volume] in Serum or Plasma 0.89 ng/mL 0.0-

4.0                          

[facility] ([facility]  









                    ID                  Date                Data Source

 

                    [id number]         2021 08:59:00 AM EDT [facility] ([facility]









          Name      Value     Range     Interpretation Code Description Data Khloe

rce(s) Supporting 

Document(s)

 

          Chol      180 mg/dL 0-200                         [facility] ([facility]  

 

                                        CHRONIC KIDNEY DISEASE STAGING PER NKF: 

  MALE GFR INTERPRETATION:  20-49 YRS:  

   >60 mL/min  Normal 50-59 YRS:     >56 mL/min  Normal 60-69 YRS:     >49 
mL/min  Normal 70-79 YRS:     >42 mL/min  Normal 80 and above  >35 mL/min  
Normal   FEMALE GRF INTERPRETATION:  20-39 YRS:    >60 mL/min  Normal 40-49 YRS:
    >58 mL/min  Normal 50-59 YRS:    >51 mL/min  Normal 60-69 YRS:    >45 mL/min
  Normal 70-79 YRS:    >39 mL/min  Normal 80 and above >32 mL/min  
NormalCLASSIFICATION            CHOLESTEROL FOR ADULTS       
CHILDREN/ADOLESCENTS*   DESIRABLE:                <200 MG/DL                   
<170 MG/DL  BORDER-LINE HIGH RISK:    200-239 MG/DL                170-199 MG/DL
  HIGH RISK:                >240 MG/DL                   >200 MG/DL    CLASS. 
FOR PRIMARY LDL CHOL PREVENTION:        LDL CHOL-CHILD/ADOLESCENTS*   DESIRABLE:
              <130 MG/DL              <110 MG/DL  BORDERLINE-HIGH RISK:   130-
159 MG/DL           110-129 MG/DL  HIGH RISK:              >160 MG/DL           
   >130 MG/DL   *CHILDREN AND ADOLESCENTS REPRESENTS INDIVIDUALA AGED 2-19 YEARS
 EXCLUSIVE. 

 

           Cholesterol in HDL [Mass/volume] in Serum or Plasma 42 mg/dL   35-55 

                           [facility] 

([facility]       

 

                                        CHRONIC KIDNEY DISEASE STAGING PER NKF: 

  MALE GFR INTERPRETATION:  20-49 YRS:  

   >60 mL/min  Normal 50-59 YRS:     >56 mL/min  Normal 60-69 YRS:     >49 
mL/min  Normal 70-79 YRS:     >42 mL/min  Normal 80 and above  >35 mL/min  
Normal   FEMALE GRF INTERPRETATION:  20-39 YRS:    >60 mL/min  Normal 40-49 YRS:
    >58 mL/min  Normal 50-59 YRS:    >51 mL/min  Normal 60-69 YRS:    >45 mL/min
  Normal 70-79 YRS:    >39 mL/min  Normal 80 and above >32 mL/min  
NormalCLASSIFICATION            CHOLESTEROL FOR ADULTS       
CHILDREN/ADOLESCENTS*   DESIRABLE:                <200 MG/DL                   
<170 MG/DL  BORDER-LINE HIGH RISK:    200-239 MG/DL                170-199 MG/DL
  HIGH RISK:                >240 MG/DL                   >200 MG/DL    CLASS. 
FOR PRIMARY LDL CHOL PREVENTION:        LDL CHOL-CHILD/ADOLESCENTS*   DESIRABLE:
              <130 MG/DL              <110 MG/DL  BORDERLINE-HIGH RISK:   130-
159 MG/DL           110-129 MG/DL  HIGH RISK:              >160 MG/DL           
   >130 MG/DL   *CHILDREN AND ADOLESCENTS REPRESENTS INDIVIDUALA AGED 2-19 YEARS
 EXCLUSIVE. 

 

          Trig      191 mg/dL                         [facility] ([facility]  

 

                                        CHRONIC KIDNEY DISEASE STAGING PER NKF: 

  MALE GFR INTERPRETATION:  20-49 YRS:  

   >60 mL/min  Normal 50-59 YRS:     >56 mL/min  Normal 60-69 YRS:     >49 
mL/min  Normal 70-79 YRS:     >42 mL/min  Normal 80 and above  >35 mL/min  
Normal   FEMALE GRF INTERPRETATION:  20-39 YRS:    >60 mL/min  Normal 40-49 YRS:
    >58 mL/min  Normal 50-59 YRS:    >51 mL/min  Normal 60-69 YRS:    >45 mL/min
  Normal 70-79 YRS:    >39 mL/min  Normal 80 and above >32 mL/min  
NormalCLASSIFICATION            CHOLESTEROL FOR ADULTS       
CHILDREN/ADOLESCENTS*   DESIRABLE:                <200 MG/DL                   
<170 MG/DL  BORDER-LINE HIGH RISK:    200-239 MG/DL                170-199 MG/DL
  HIGH RISK:                >240 MG/DL                   >200 MG/DL    CLASS. 
FOR PRIMARY LDL CHOL PREVENTION:        LDL CHOL-CHILD/ADOLESCENTS*   DESIRABLE:
              <130 MG/DL              <110 MG/DL  BORDERLINE-HIGH RISK:   130-
159 MG/DL           110-129 MG/DL  HIGH RISK:              >160 MG/DL           
   >130 MG/DL   *CHILDREN AND ADOLESCENTS REPRESENTS INDIVIDUALA AGED 2-19 YEARS
 EXCLUSIVE. 

 

          LDL_C     100 Calc                          [facility] ([facility]  

 

                                        CHRONIC KIDNEY DISEASE STAGING PER NKF: 

  MALE GFR INTERPRETATION:  20-49 YRS:  

   >60 mL/min  Normal 50-59 YRS:     >56 mL/min  Normal 60-69 YRS:     >49 
mL/min  Normal 70-79 YRS:     >42 mL/min  Normal 80 and above  >35 mL/min  
Normal   FEMALE GRF INTERPRETATION:  20-39 YRS:    >60 mL/min  Normal 40-49 YRS:
    >58 mL/min  Normal 50-59 YRS:    >51 mL/min  Normal 60-69 YRS:    >45 mL/min
  Normal 70-79 YRS:    >39 mL/min  Normal 80 and above >32 mL/min  
NormalCLASSIFICATION            CHOLESTEROL FOR ADULTS       
CHILDREN/ADOLESCENTS*   DESIRABLE:                <200 MG/DL                   
<170 MG/DL  BORDER-LINE HIGH RISK:    200-239 MG/DL                170-199 MG/DL
  HIGH RISK:                >240 MG/DL                   >200 MG/DL    CLASS. 
FOR PRIMARY LDL CHOL PREVENTION:        LDL CHOL-CHILD/ADOLESCENTS*   DESIRABLE:
              <130 MG/DL              <110 MG/DL  BORDERLINE-HIGH RISK:   130-
159 MG/DL           110-129 MG/DL  HIGH RISK:              >160 MG/DL           
   >130 MG/DL   *CHILDREN AND ADOLESCENTS REPRESENTS INDIVIDUALA AGED 2-19 YEARS
 EXCLUSIVE. 

 

          Cho/HDL Ratio 4.3 CALC                                [facility] ([facility]  

 

                                        CHRONIC KIDNEY DISEASE STAGING PER NKF: 

  MALE GFR INTERPRETATION:  20-49 YRS:  

   >60 mL/min  Normal 50-59 YRS:     >56 mL/min  Normal 60-69 YRS:     >49 
mL/min  Normal 70-79 YRS:     >42 mL/min  Normal 80 and above  >35 mL/min  
Normal   FEMALE GRF INTERPRETATION:  20-39 YRS:    >60 mL/min  Normal 40-49 YRS:
    >58 mL/min  Normal 50-59 YRS:    >51 mL/min  Normal 60-69 YRS:    >45 mL/min
  Normal 70-79 YRS:    >39 mL/min  Normal 80 and above >32 mL/min  
NormalCLASSIFICATION            CHOLESTEROL FOR ADULTS       
CHILDREN/ADOLESCENTS*   DESIRABLE:                <200 MG/DL                   
<170 MG/DL  BORDER-LINE HIGH RISK:    200-239 MG/DL                170-199 MG/DL
  HIGH RISK:                >240 MG/DL                   >200 MG/DL    CLASS. 
FOR PRIMARY LDL CHOL PREVENTION:        LDL CHOL-CHILD/ADOLESCENTS*   DESIRABLE:
              <130 MG/DL              <110 MG/DL  BORDERLINE-HIGH RISK:   130-
159 MG/DL           110-129 MG/DL  HIGH RISK:              >160 MG/DL           
   >130 MG/DL   *CHILDREN AND ADOLESCENTS REPRESENTS INDIVIDUALA AGED 2-19 YEARS
 EXCLUSIVE. 









                    ID                  Date                Data Source

 

                    [id number]         2021 08:59:00 AM EDT [facility] ([facility]









          Name      Value     Range     Interpretation Code Description Data Khloe

rce(s) Supporting 

Document(s)

 

           Glu        207 mg/dL       Above high normal            [facility] 

([facility]                                    

 

                                        CHRONIC KIDNEY DISEASE STAGING PER NKF: 

  MALE GFR INTERPRETATION:  20-49 YRS:  

   >60 mL/min  Normal 50-59 YRS:     >56 mL/min  Normal 60-69 YRS:     >49 
mL/min  Normal 70-79 YRS:     >42 mL/min  Normal 80 and above  >35 mL/min  
Normal   FEMALE GRF INTERPRETATION:  20-39 YRS:    >60 mL/min  Normal 40-49 YRS:
    >58 mL/min  Normal 50-59 YRS:    >51 mL/min  Normal 60-69 YRS:    >45 mL/min
  Normal 70-79 YRS:    >39 mL/min  Normal 80 and above >32 mL/min  
NormalCLASSIFICATION            CHOLESTEROL FOR ADULTS       
CHILDREN/ADOLESCENTS*   DESIRABLE:                <200 MG/DL                   
<170 MG/DL  BORDER-LINE HIGH RISK:    200-239 MG/DL                170-199 MG/DL
  HIGH RISK:                >240 MG/DL                   >200 MG/DL    CLASS. 
FOR PRIMARY LDL CHOL PREVENTION:        LDL CHOL-CHILD/ADOLESCENTS*   DESIRABLE:
              <130 MG/DL              <110 MG/DL  BORDERLINE-HIGH RISK:   130-
159 MG/DL           110-129 MG/DL  HIGH RISK:              >160 MG/DL           
   >130 MG/DL   *CHILDREN AND ADOLESCENTS REPRESENTS INDIVIDUALA AGED 2-19 YEARS
 EXCLUSIVE. 

 

          BUN       20 mg/dL  8-23                          [facility] ([facility]  

 

                                        CHRONIC KIDNEY DISEASE STAGING PER NKF: 

  MALE GFR INTERPRETATION:  20-49 YRS:  

   >60 mL/min  Normal 50-59 YRS:     >56 mL/min  Normal 60-69 YRS:     >49 
mL/min  Normal 70-79 YRS:     >42 mL/min  Normal 80 and above  >35 mL/min  
Normal   FEMALE GRF INTERPRETATION:  20-39 YRS:    >60 mL/min  Normal 40-49 YRS:
    >58 mL/min  Normal 50-59 YRS:    >51 mL/min  Normal 60-69 YRS:    >45 mL/min
  Normal 70-79 YRS:    >39 mL/min  Normal 80 and above >32 mL/min  
NormalCLASSIFICATION            CHOLESTEROL FOR ADULTS       
CHILDREN/ADOLESCENTS*   DESIRABLE:                <200 MG/DL                   
<170 MG/DL  BORDER-LINE HIGH RISK:    200-239 MG/DL                170-199 MG/DL
  HIGH RISK:                >240 MG/DL                   >200 MG/DL    CLASS. 
FOR PRIMARY LDL CHOL PREVENTION:        LDL CHOL-CHILD/ADOLESCENTS*   DESIRABLE:
              <130 MG/DL              <110 MG/DL  BORDERLINE-HIGH RISK:   130-
159 MG/DL           110-129 MG/DL  HIGH RISK:              >160 MG/DL           
   >130 MG/DL   *CHILDREN AND ADOLESCENTS REPRESENTS INDIVIDUALA AGED 2-19 YEARS
 EXCLUSIVE. 

 

          BUN/Creatinine Ratio 18.2 CALC                               [facility] ([facility]  

 

                                        CHRONIC KIDNEY DISEASE STAGING PER NKF: 

  MALE GFR INTERPRETATION:  20-49 YRS:  

   >60 mL/min  Normal 50-59 YRS:     >56 mL/min  Normal 60-69 YRS:     >49 
mL/min  Normal 70-79 YRS:     >42 mL/min  Normal 80 and above  >35 mL/min  
Normal   FEMALE GRF INTERPRETATION:  20-39 YRS:    >60 mL/min  Normal 40-49 YRS:
    >58 mL/min  Normal 50-59 YRS:    >51 mL/min  Normal 60-69 YRS:    >45 mL/min
  Normal 70-79 YRS:    >39 mL/min  Normal 80 and above >32 mL/min  
NormalCLASSIFICATION            CHOLESTEROL FOR ADULTS       
CHILDREN/ADOLESCENTS*   DESIRABLE:                <200 MG/DL                   
<170 MG/DL  BORDER-LINE HIGH RISK:    200-239 MG/DL                170-199 MG/DL
  HIGH RISK:                >240 MG/DL                   >200 MG/DL    CLASS. 
FOR PRIMARY LDL CHOL PREVENTION:        LDL CHOL-CHILD/ADOLESCENTS*   DESIRABLE:
              <130 MG/DL              <110 MG/DL  BORDERLINE-HIGH RISK:   130-
159 MG/DL           110-129 MG/DL  HIGH RISK:              >160 MG/DL           
   >130 MG/DL   *CHILDREN AND ADOLESCENTS REPRESENTS INDIVIDUALA AGED 2-19 YEARS
 EXCLUSIVE. 

 

          Creat     1.1 mg/dL 0.7-1.2                       [facility] ([facility]  

 

                                        CHRONIC KIDNEY DISEASE STAGING PER NKF: 

  MALE GFR INTERPRETATION:  20-49 YRS:  

   >60 mL/min  Normal 50-59 YRS:     >56 mL/min  Normal 60-69 YRS:     >49 
mL/min  Normal 70-79 YRS:     >42 mL/min  Normal 80 and above  >35 mL/min  
Normal   FEMALE GRF INTERPRETATION:  20-39 YRS:    >60 mL/min  Normal 40-49 YRS:
    >58 mL/min  Normal 50-59 YRS:    >51 mL/min  Normal 60-69 YRS:    >45 mL/min
  Normal 70-79 YRS:    >39 mL/min  Normal 80 and above >32 mL/min  
NormalCLASSIFICATION            CHOLESTEROL FOR ADULTS       
CHILDREN/ADOLESCENTS*   DESIRABLE:                <200 MG/DL                   
<170 MG/DL  BORDER-LINE HIGH RISK:    200-239 MG/DL                170-199 MG/DL
  HIGH RISK:                >240 MG/DL                   >200 MG/DL    CLASS. 
FOR PRIMARY LDL CHOL PREVENTION:        LDL CHOL-CHILD/ADOLESCENTS*   DESIRABLE:
              <130 MG/DL              <110 MG/DL  BORDERLINE-HIGH RISK:   130-
159 MG/DL           110-129 MG/DL  HIGH RISK:              >160 MG/DL           
   >130 MG/DL   *CHILDREN AND ADOLESCENTS REPRESENTS INDIVIDUALA AGED 2-19 YEARS
 EXCLUSIVE. 

 

           Na         134 mmol/L 136-145    Below low normal            [facility] (

[facility]                                    

 

                                        CHRONIC KIDNEY DISEASE STAGING PER NKF: 

  MALE GFR INTERPRETATION:  20-49 YRS:  

   >60 mL/min  Normal 50-59 YRS:     >56 mL/min  Normal 60-69 YRS:     >49 
mL/min  Normal 70-79 YRS:     >42 mL/min  Normal 80 and above  >35 mL/min  
Normal   FEMALE GRF INTERPRETATION:  20-39 YRS:    >60 mL/min  Normal 40-49 YRS:
    >58 mL/min  Normal 50-59 YRS:    >51 mL/min  Normal 60-69 YRS:    >45 mL/min
  Normal 70-79 YRS:    >39 mL/min  Normal 80 and above >32 mL/min  
NormalCLASSIFICATION            CHOLESTEROL FOR ADULTS       
CHILDREN/ADOLESCENTS*   DESIRABLE:                <200 MG/DL                   
<170 MG/DL  BORDER-LINE HIGH RISK:    200-239 MG/DL                170-199 MG/DL
  HIGH RISK:                >240 MG/DL                   >200 MG/DL    CLASS. 
FOR PRIMARY LDL CHOL PREVENTION:        LDL CHOL-CHILD/ADOLESCENTS*   DESIRABLE:
              <130 MG/DL              <110 MG/DL  BORDERLINE-HIGH RISK:   130-
159 MG/DL           110-129 MG/DL  HIGH RISK:              >160 MG/DL           
   >130 MG/DL   *CHILDREN AND ADOLESCENTS REPRESENTS INDIVIDUALA AGED 2-19 YEARS
 EXCLUSIVE. 

 

          K         4.1 mmol/L 3.5-5.1                       [facility] ([facility]  

 

                                        CHRONIC KIDNEY DISEASE STAGING PER NKF: 

  MALE GFR INTERPRETATION:  20-49 YRS:  

   >60 mL/min  Normal 50-59 YRS:     >56 mL/min  Normal 60-69 YRS:     >49 
mL/min  Normal 70-79 YRS:     >42 mL/min  Normal 80 and above  >35 mL/min  
Normal   FEMALE GRF INTERPRETATION:  20-39 YRS:    >60 mL/min  Normal 40-49 YRS:
    >58 mL/min  Normal 50-59 YRS:    >51 mL/min  Normal 60-69 YRS:    >45 mL/min
  Normal 70-79 YRS:    >39 mL/min  Normal 80 and above >32 mL/min  
NormalCLASSIFICATION            CHOLESTEROL FOR ADULTS       
CHILDREN/ADOLESCENTS*   DESIRABLE:                <200 MG/DL                   
<170 MG/DL  BORDER-LINE HIGH RISK:    200-239 MG/DL                170-199 MG/DL
  HIGH RISK:                >240 MG/DL                   >200 MG/DL    CLASS. 
FOR PRIMARY LDL CHOL PREVENTION:        LDL CHOL-CHILD/ADOLESCENTS*   DESIRABLE:
              <130 MG/DL              <110 MG/DL  BORDERLINE-HIGH RISK:   130-
159 MG/DL           110-129 MG/DL  HIGH RISK:              >160 MG/DL           
   >130 MG/DL   *CHILDREN AND ADOLESCENTS REPRESENTS INDIVIDUALA AGED 2-19 YEARS
 EXCLUSIVE. 

 

          CL        99.9 mmol/L 98.0-107.0                     [facility] ([facility]  

 

                                        CHRONIC KIDNEY DISEASE STAGING PER NKF: 

  MALE GFR INTERPRETATION:  20-49 YRS:  

   >60 mL/min  Normal 50-59 YRS:     >56 mL/min  Normal 60-69 YRS:     >49 
mL/min  Normal 70-79 YRS:     >42 mL/min  Normal 80 and above  >35 mL/min  
Normal   FEMALE GRF INTERPRETATION:  20-39 YRS:    >60 mL/min  Normal 40-49 YRS:
    >58 mL/min  Normal 50-59 YRS:    >51 mL/min  Normal 60-69 YRS:    >45 mL/min
  Normal 70-79 YRS:    >39 mL/min  Normal 80 and above >32 mL/min  
NormalCLASSIFICATION            CHOLESTEROL FOR ADULTS       
CHILDREN/ADOLESCENTS*   DESIRABLE:                <200 MG/DL                   
<170 MG/DL  BORDER-LINE HIGH RISK:    200-239 MG/DL                170-199 MG/DL
  HIGH RISK:                >240 MG/DL                   >200 MG/DL    CLASS. 
FOR PRIMARY LDL CHOL PREVENTION:        LDL CHOL-CHILD/ADOLESCENTS*   DESIRABLE:
              <130 MG/DL              <110 MG/DL  BORDERLINE-HIGH RISK:   130-
159 MG/DL           110-129 MG/DL  HIGH RISK:              >160 MG/DL           
   >130 MG/DL   *CHILDREN AND ADOLESCENTS REPRESENTS INDIVIDUALA AGED 2-19 YEARS
 EXCLUSIVE. 

 

           Co2        21.9 mmol/L 22.0-29.0  Below low normal            [facility] 

([facility]                                   

 

                                        CHRONIC KIDNEY DISEASE STAGING PER NKF: 

  MALE GFR INTERPRETATION:  20-49 YRS:  

   >60 mL/min  Normal 50-59 YRS:     >56 mL/min  Normal 60-69 YRS:     >49 
mL/min  Normal 70-79 YRS:     >42 mL/min  Normal 80 and above  >35 mL/min  
Normal   FEMALE GRF INTERPRETATION:  20-39 YRS:    >60 mL/min  Normal 40-49 YRS:
    >58 mL/min  Normal 50-59 YRS:    >51 mL/min  Normal 60-69 YRS:    >45 mL/min
  Normal 70-79 YRS:    >39 mL/min  Normal 80 and above >32 mL/min  
NormalCLASSIFICATION            CHOLESTEROL FOR ADULTS       
CHILDREN/ADOLESCENTS*   DESIRABLE:                <200 MG/DL                   
<170 MG/DL  BORDER-LINE HIGH RISK:    200-239 MG/DL                170-199 MG/DL
  HIGH RISK:                >240 MG/DL                   >200 MG/DL    CLASS. 
FOR PRIMARY LDL CHOL PREVENTION:        LDL CHOL-CHILD/ADOLESCENTS*   DESIRABLE:
              <130 MG/DL              <110 MG/DL  BORDERLINE-HIGH RISK:   130-
159 MG/DL           110-129 MG/DL  HIGH RISK:              >160 MG/DL           
   >130 MG/DL   *CHILDREN AND ADOLESCENTS REPRESENTS INDIVIDUALA AGED 2-19 YEARS
 EXCLUSIVE. 

 

          CA        8.8 mg/dL 8.6-10.2                      [facility] ([facility]  

 

                                        CHRONIC KIDNEY DISEASE STAGING PER NKF: 

  MALE GFR INTERPRETATION:  20-49 YRS:  

   >60 mL/min  Normal 50-59 YRS:     >56 mL/min  Normal 60-69 YRS:     >49 
mL/min  Normal 70-79 YRS:     >42 mL/min  Normal 80 and above  >35 mL/min  
Normal   FEMALE GRF INTERPRETATION:  20-39 YRS:    >60 mL/min  Normal 40-49 YRS:
    >58 mL/min  Normal 50-59 YRS:    >51 mL/min  Normal 60-69 YRS:    >45 mL/min
  Normal 70-79 YRS:    >39 mL/min  Normal 80 and above >32 mL/min  
NormalCLASSIFICATION            CHOLESTEROL FOR ADULTS       
CHILDREN/ADOLESCENTS*   DESIRABLE:                <200 MG/DL                   
<170 MG/DL  BORDER-LINE HIGH RISK:    200-239 MG/DL                170-199 MG/DL
  HIGH RISK:                >240 MG/DL                   >200 MG/DL    CLASS. 
FOR PRIMARY LDL CHOL PREVENTION:        LDL CHOL-CHILD/ADOLESCENTS*   DESIRABLE:
              <130 MG/DL              <110 MG/DL  BORDERLINE-HIGH RISK:   130-
159 MG/DL           110-129 MG/DL  HIGH RISK:              >160 MG/DL           
   >130 MG/DL   *CHILDREN AND ADOLESCENTS REPRESENTS INDIVIDUALA AGED 2-19 YEARS
 EXCLUSIVE. 

 

           TP         6.2 g/dL   6.6-8.7    Below low normal            [facility] (

[facility]

                                         

 

                                        CHRONIC KIDNEY DISEASE STAGING PER NKF: 

  MALE GFR INTERPRETATION:  20-49 YRS:  

   >60 mL/min  Normal 50-59 YRS:     >56 mL/min  Normal 60-69 YRS:     >49 
mL/min  Normal 70-79 YRS:     >42 mL/min  Normal 80 and above  >35 mL/min  
Normal   FEMALE GRF INTERPRETATION:  20-39 YRS:    >60 mL/min  Normal 40-49 YRS:
    >58 mL/min  Normal 50-59 YRS:    >51 mL/min  Normal 60-69 YRS:    >45 mL/min
  Normal 70-79 YRS:    >39 mL/min  Normal 80 and above >32 mL/min  
NormalCLASSIFICATION            CHOLESTEROL FOR ADULTS       
CHILDREN/ADOLESCENTS*   DESIRABLE:                <200 MG/DL                   
<170 MG/DL  BORDER-LINE HIGH RISK:    200-239 MG/DL                170-199 MG/DL
  HIGH RISK:                >240 MG/DL                   >200 MG/DL    CLASS. 
FOR PRIMARY LDL CHOL PREVENTION:        LDL CHOL-CHILD/ADOLESCENTS*   DESIRABLE:
              <130 MG/DL              <110 MG/DL  BORDERLINE-HIGH RISK:   130-
159 MG/DL           110-129 MG/DL  HIGH RISK:              >160 MG/DL           
   >130 MG/DL   *CHILDREN AND ADOLESCENTS REPRESENTS INDIVIDUALA AGED 2-19 YEARS
 EXCLUSIVE. 

 

          Alb       4.2 g/dL  3.5-5.2                       [facility] ([facility]  

 

                                        CHRONIC KIDNEY DISEASE STAGING PER NKF: 

  MALE GFR INTERPRETATION:  20-49 YRS:  

   >60 mL/min  Normal 50-59 YRS:     >56 mL/min  Normal 60-69 YRS:     >49 
mL/min  Normal 70-79 YRS:     >42 mL/min  Normal 80 and above  >35 mL/min  
Normal   FEMALE GRF INTERPRETATION:  20-39 YRS:    >60 mL/min  Normal 40-49 YRS:
    >58 mL/min  Normal 50-59 YRS:    >51 mL/min  Normal 60-69 YRS:    >45 mL/min
  Normal 70-79 YRS:    >39 mL/min  Normal 80 and above >32 mL/min  
NormalCLASSIFICATION            CHOLESTEROL FOR ADULTS       
CHILDREN/ADOLESCENTS*   DESIRABLE:                <200 MG/DL                   
<170 MG/DL  BORDER-LINE HIGH RISK:    200-239 MG/DL                170-199 MG/DL
  HIGH RISK:                >240 MG/DL                   >200 MG/DL    CLASS. 
FOR PRIMARY LDL CHOL PREVENTION:        LDL CHOL-CHILD/ADOLESCENTS*   DESIRABLE:
              <130 MG/DL              <110 MG/DL  BORDERLINE-HIGH RISK:   130-
159 MG/DL           110-129 MG/DL  HIGH RISK:              >160 MG/DL           
   >130 MG/DL   *CHILDREN AND ADOLESCENTS REPRESENTS INDIVIDUALA AGED 2-19 YEARS
 EXCLUSIVE. 

 

          A/G Ratio 2.1 CALC                                [facility] ([facility]  

 

                                        CHRONIC KIDNEY DISEASE STAGING PER NKF: 

  MALE GFR INTERPRETATION:  20-49 YRS:  

   >60 mL/min  Normal 50-59 YRS:     >56 mL/min  Normal 60-69 YRS:     >49 
mL/min  Normal 70-79 YRS:     >42 mL/min  Normal 80 and above  >35 mL/min  
Normal   FEMALE GRF INTERPRETATION:  20-39 YRS:    >60 mL/min  Normal 40-49 YRS:
    >58 mL/min  Normal 50-59 YRS:    >51 mL/min  Normal 60-69 YRS:    >45 mL/min
  Normal 70-79 YRS:    >39 mL/min  Normal 80 and above >32 mL/min  
NormalCLASSIFICATION            CHOLESTEROL FOR ADULTS       
CHILDREN/ADOLESCENTS*   DESIRABLE:                <200 MG/DL                   
<170 MG/DL  BORDER-LINE HIGH RISK:    200-239 MG/DL                170-199 MG/DL
  HIGH RISK:                >240 MG/DL                   >200 MG/DL    CLASS. 
FOR PRIMARY LDL CHOL PREVENTION:        LDL CHOL-CHILD/ADOLESCENTS*   DESIRABLE:
              <130 MG/DL              <110 MG/DL  BORDERLINE-HIGH RISK:   130-
159 MG/DL           110-129 MG/DL  HIGH RISK:              >160 MG/DL           
   >130 MG/DL   *CHILDREN AND ADOLESCENTS REPRESENTS INDIVIDUALA AGED 2-19 YEARS
 EXCLUSIVE. 

 

          Globulin  2.0 CALC                                [facility] ([facility]  

 

                                        CHRONIC KIDNEY DISEASE STAGING PER NKF: 

  MALE GFR INTERPRETATION:  20-49 YRS:  

   >60 mL/min  Normal 50-59 YRS:     >56 mL/min  Normal 60-69 YRS:     >49 
mL/min  Normal 70-79 YRS:     >42 mL/min  Normal 80 and above  >35 mL/min  
Normal   FEMALE GRF INTERPRETATION:  20-39 YRS:    >60 mL/min  Normal 40-49 YRS:
    >58 mL/min  Normal 50-59 YRS:    >51 mL/min  Normal 60-69 YRS:    >45 mL/min
  Normal 70-79 YRS:    >39 mL/min  Normal 80 and above >32 mL/min  
NormalCLASSIFICATION            CHOLESTEROL FOR ADULTS       
CHILDREN/ADOLESCENTS*   DESIRABLE:                <200 MG/DL                   
<170 MG/DL  BORDER-LINE HIGH RISK:    200-239 MG/DL                170-199 MG/DL
  HIGH RISK:                >240 MG/DL                   >200 MG/DL    CLASS. 
FOR PRIMARY LDL CHOL PREVENTION:        LDL CHOL-CHILD/ADOLESCENTS*   DESIRABLE:
              <130 MG/DL              <110 MG/DL  BORDERLINE-HIGH RISK:   130-
159 MG/DL           110-129 MG/DL  HIGH RISK:              >160 MG/DL           
   >130 MG/DL   *CHILDREN AND ADOLESCENTS REPRESENTS INDIVIDUALA AGED 2-19 YEARS
 EXCLUSIVE. 

 

          Ast (Sgot) 12 U/L    0-40                          [facility] ([facility]  

 

                                        CHRONIC KIDNEY DISEASE STAGING PER NKF: 

  MALE GFR INTERPRETATION:  20-49 YRS:  

   >60 mL/min  Normal 50-59 YRS:     >56 mL/min  Normal 60-69 YRS:     >49 
mL/min  Normal 70-79 YRS:     >42 mL/min  Normal 80 and above  >35 mL/min  
Normal   FEMALE GRF INTERPRETATION:  20-39 YRS:    >60 mL/min  Normal 40-49 YRS:
    >58 mL/min  Normal 50-59 YRS:    >51 mL/min  Normal 60-69 YRS:    >45 mL/min
  Normal 70-79 YRS:    >39 mL/min  Normal 80 and above >32 mL/min  
NormalCLASSIFICATION            CHOLESTEROL FOR ADULTS       
CHILDREN/ADOLESCENTS*   DESIRABLE:                <200 MG/DL                   
<170 MG/DL  BORDER-LINE HIGH RISK:    200-239 MG/DL                170-199 MG/DL
  HIGH RISK:                >240 MG/DL                   >200 MG/DL    CLASS. 
FOR PRIMARY LDL CHOL PREVENTION:        LDL CHOL-CHILD/ADOLESCENTS*   DESIRABLE:
              <130 MG/DL              <110 MG/DL  BORDERLINE-HIGH RISK:   130-
159 MG/DL           110-129 MG/DL  HIGH RISK:              >160 MG/DL           
   >130 MG/DL   *CHILDREN AND ADOLESCENTS REPRESENTS INDIVIDUALA AGED 2-19 YEARS
 EXCLUSIVE. 

 

          Alt (SGPT) 11 U/L    0-41                          [facility] ([facility]  

 

                                        CHRONIC KIDNEY DISEASE STAGING PER NKF: 

  MALE GFR INTERPRETATION:  20-49 YRS:  

   >60 mL/min  Normal 50-59 YRS:     >56 mL/min  Normal 60-69 YRS:     >49 
mL/min  Normal 70-79 YRS:     >42 mL/min  Normal 80 and above  >35 mL/min  
Normal   FEMALE GRF INTERPRETATION:  20-39 YRS:    >60 mL/min  Normal 40-49 YRS:
    >58 mL/min  Normal 50-59 YRS:    >51 mL/min  Normal 60-69 YRS:    >45 mL/min
  Normal 70-79 YRS:    >39 mL/min  Normal 80 and above >32 mL/min  
NormalCLASSIFICATION            CHOLESTEROL FOR ADULTS       
CHILDREN/ADOLESCENTS*   DESIRABLE:                <200 MG/DL                   
<170 MG/DL  BORDER-LINE HIGH RISK:    200-239 MG/DL                170-199 MG/DL
  HIGH RISK:                >240 MG/DL                   >200 MG/DL    CLASS. 
FOR PRIMARY LDL CHOL PREVENTION:        LDL CHOL-CHILD/ADOLESCENTS*   DESIRABLE:
              <130 MG/DL              <110 MG/DL  BORDERLINE-HIGH RISK:   130-
159 MG/DL           110-129 MG/DL  HIGH RISK:              >160 MG/DL           
   >130 MG/DL   *CHILDREN AND ADOLESCENTS REPRESENTS INDIVIDUALA AGED 2-19 YEARS
 EXCLUSIVE. 

 

          Alp       79.4 U/L                          [facility]  

 

                                        CHRONIC KIDNEY DISEASE STAGING PER NKF: 

  MALE GFR INTERPRETATION:  20-49 YRS:  

   >60 mL/min  Normal 50-59 YRS:     >56 mL/min  Normal 60-69 YRS:     >49 
mL/min  Normal 70-79 YRS:     >42 mL/min  Normal 80 and above  >35 mL/min  
Normal   FEMALE GRF INTERPRETATION:  20-39 YRS:    >60 mL/min  Normal 40-49 YRS:
    >58 mL/min  Normal 50-59 YRS:    >51 mL/min  Normal 60-69 YRS:    >45 mL/min
  Normal 70-79 YRS:    >39 mL/min  Normal 80 and above >32 mL/min  
NormalCLASSIFICATION            CHOLESTEROL FOR ADULTS       
CHILDREN/ADOLESCENTS*   DESIRABLE:                <200 MG/DL                   
<170 MG/DL  BORDER-LINE HIGH RISK:    200-239 MG/DL                170-199 MG/DL
  HIGH RISK:                >240 MG/DL                   >200 MG/DL    CLASS. 
FOR PRIMARY LDL CHOL PREVENTION:        LDL CHOL-CHILD/ADOLESCENTS*   DESIRABLE:
              <130 MG/DL              <110 MG/DL  BORDERLINE-HIGH RISK:   130-
159 MG/DL           110-129 MG/DL  HIGH RISK:              >160 MG/DL           
   >130 MG/DL   *CHILDREN AND ADOLESCENTS REPRESENTS INDIVIDUALA AGED 2-19 YEARS
 EXCLUSIVE. 

 

           Osmolality-Calculated 276.4 CALC                                  [facility]

[facility] ([facility]

                                         

 

                                        CHRONIC KIDNEY DISEASE STAGING PER NKF: 

  MALE GFR INTERPRETATION:  20-49 YRS:  

   >60 mL/min  Normal 50-59 YRS:     >56 mL/min  Normal 60-69 YRS:     >49 
mL/min  Normal 70-79 YRS:     >42 mL/min  Normal 80 and above  >35 mL/min  
Normal   FEMALE GRF INTERPRETATION:  20-39 YRS:    >60 mL/min  Normal 40-49 YRS:
    >58 mL/min  Normal 50-59 YRS:    >51 mL/min  Normal 60-69 YRS:    >45 mL/min
  Normal 70-79 YRS:    >39 mL/min  Normal 80 and above >32 mL/min  
NormalCLASSIFICATION            CHOLESTEROL FOR ADULTS       
CHILDREN/ADOLESCENTS*   DESIRABLE:                <200 MG/DL                   
<170 MG/DL  BORDER-LINE HIGH RISK:    200-239 MG/DL                170-199 MG/DL
  HIGH RISK:                >240 MG/DL                   >200 MG/DL    CLASS. 
FOR PRIMARY LDL CHOL PREVENTION:        LDL CHOL-CHILD/ADOLESCENTS*   DESIRABLE:
              <130 MG/DL              <110 MG/DL  BORDERLINE-HIGH RISK:   130-
159 MG/DL           110-129 MG/DL  HIGH RISK:              >160 MG/DL           
   >130 MG/DL   *CHILDREN AND ADOLESCENTS REPRESENTS INDIVIDUALA AGED 2-19 YEARS
 EXCLUSIVE. 

 

          Tbili     0.52 mg/dL 0.0-1.2                       [facility] ([facility]  

 

                                        CHRONIC KIDNEY DISEASE STAGING PER NKF: 

  MALE GFR INTERPRETATION:  20-49 YRS:  

   >60 mL/min  Normal 50-59 YRS:     >56 mL/min  Normal 60-69 YRS:     >49 
mL/min  Normal 70-79 YRS:     >42 mL/min  Normal 80 and above  >35 mL/min  
Normal   FEMALE GRF INTERPRETATION:  20-39 YRS:    >60 mL/min  Normal 40-49 YRS:
    >58 mL/min  Normal 50-59 YRS:    >51 mL/min  Normal 60-69 YRS:    >45 mL/min
  Normal 70-79 YRS:    >39 mL/min  Normal 80 and above >32 mL/min  
NormalCLASSIFICATION            CHOLESTEROL FOR ADULTS       
CHILDREN/ADOLESCENTS*   DESIRABLE:                <200 MG/DL                   
<170 MG/DL  BORDER-LINE HIGH RISK:    200-239 MG/DL                170-199 MG/DL
  HIGH RISK:                >240 MG/DL                   >200 MG/DL    CLASS. 
FOR PRIMARY LDL CHOL PREVENTION:        LDL CHOL-CHILD/ADOLESCENTS*   DESIRABLE:
              <130 MG/DL              <110 MG/DL  BORDERLINE-HIGH RISK:   130-
159 MG/DL           110-129 MG/DL  HIGH RISK:              >160 MG/DL           
   >130 MG/DL   *CHILDREN AND ADOLESCENTS REPRESENTS INDIVIDUALA AGED 2-19 YEARS
 EXCLUSIVE. 

 

          eGFR  78 #                                    [facility] (

[facility]  

 

                                        CHRONIC KIDNEY DISEASE STAGING PER NKF: 

  MALE GFR INTERPRETATION:  20-49 YRS:  

   >60 mL/min  Normal 50-59 YRS:     >56 mL/min  Normal 60-69 YRS:     >49 
mL/min  Normal 70-79 YRS:     >42 mL/min  Normal 80 and above  >35 mL/min  
Normal   FEMALE GRF INTERPRETATION:  20-39 YRS:    >60 mL/min  Normal 40-49 YRS:
    >58 mL/min  Normal 50-59 YRS:    >51 mL/min  Normal 60-69 YRS:    >45 mL/min
  Normal 70-79 YRS:    >39 mL/min  Normal 80 and above >32 mL/min  
NormalCLASSIFICATION            CHOLESTEROL FOR ADULTS       
CHILDREN/ADOLESCENTS*   DESIRABLE:                <200 MG/DL                   
<170 MG/DL  BORDER-LINE HIGH RISK:    200-239 MG/DL                170-199 MG/DL
  HIGH RISK:                >240 MG/DL                   >200 MG/DL    CLASS. 
FOR PRIMARY LDL CHOL PREVENTION:        LDL CHOL-CHILD/ADOLESCENTS*   DESIRABLE:
              <130 MG/DL              <110 MG/DL  BORDERLINE-HIGH RISK:   130-
159 MG/DL           110-129 MG/DL  HIGH RISK:              >160 MG/DL           
   >130 MG/DL   *CHILDREN AND ADOLESCENTS REPRESENTS INDIVIDUALA AGED 2-19 YEARS
 EXCLUSIVE. 

 

          Anion Gap 16 mmol/L                               [facility]  

 

                                        CHRONIC KIDNEY DISEASE STAGING PER NKF: 

  MALE GFR INTERPRETATION:  20-49 YRS:  

   >60 mL/min  Normal 50-59 YRS:     >56 mL/min  Normal 60-69 YRS:     >49 
mL/min  Normal 70-79 YRS:     >42 mL/min  Normal 80 and above  >35 mL/min  
Normal   FEMALE GRF INTERPRETATION:  20-39 YRS:    >60 mL/min  Normal 40-49 YRS:
    >58 mL/min  Normal 50-59 YRS:    >51 mL/min  Normal 60-69 YRS:    >45 mL/min
  Normal 70-79 YRS:    >39 mL/min  Normal 80 and above >32 mL/min  
NormalCLASSIFICATION            CHOLESTEROL FOR ADULTS       
CHILDREN/ADOLESCENTS*   DESIRABLE:                <200 MG/DL                   
<170 MG/DL  BORDER-LINE HIGH RISK:    200-239 MG/DL                170-199 MG/DL
  HIGH RISK:                >240 MG/DL                   >200 MG/DL    CLASS. 
FOR PRIMARY LDL CHOL PREVENTION:        LDL CHOL-CHILD/ADOLESCENTS*   DESIRABLE:
              <130 MG/DL              <110 MG/DL  BORDERLINE-HIGH RISK:   130-
159 MG/DL           110-129 MG/DL  HIGH RISK:              >160 MG/DL           
   >130 MG/DL   *CHILDREN AND ADOLESCENTS REPRESENTS INDIVIDUALA AGED 2-19 YEARS
 EXCLUSIVE. 

 

          eGFR Non-Afr. American 67 #                                    PRANAY 

([facility]  

 

                                        CHRONIC KIDNEY DISEASE STAGING PER NKF: 

  MALE GFR INTERPRETATION:  20-49 YRS:  

   >60 mL/min  Normal 50-59 YRS:     >56 mL/min  Normal 60-69 YRS:     >49 
mL/min  Normal 70-79 YRS:     >42 mL/min  Normal 80 and above  >35 mL/min  
Normal   FEMALE GRF INTERPRETATION:  20-39 YRS:    >60 mL/min  Normal 40-49 YRS:
    >58 mL/min  Normal 50-59 YRS:    >51 mL/min  Normal 60-69 YRS:    >45 mL/min
  Normal 70-79 YRS:    >39 mL/min  Normal 80 and above >32 mL/min  
NormalCLASSIFICATION            CHOLESTEROL FOR ADULTS       
CHILDREN/ADOLESCENTS*   DESIRABLE:                <200 MG/DL                   
<170 MG/DL  BORDER-LINE HIGH RISK:    200-239 MG/DL                170-199 MG/DL
  HIGH RISK:                >240 MG/DL                   >200 MG/DL    CLASS. 
FOR PRIMARY LDL CHOL PREVENTION:        LDL CHOL-CHILD/ADOLESCENTS*   DESIRABLE:
              <130 MG/DL              <110 MG/DL  BORDERLINE-HIGH RISK:   130-
159 MG/DL           110-129 MG/DL  HIGH RISK:              >160 MG/DL           
   >130 MG/DL   *CHILDREN AND ADOLESCENTS REPRESENTS INDIVIDUALA AGED 2-19 YEARS
 EXCLUSIVE. 









                    ID                  Date                Data Source

 

                    [id number]         2021 01:53:00 PM EDT [facility] ([facility]









          Name      Value     Range     Interpretation Code Description Data Khloe

rce(s) Supporting 

Document(s)

 

           Pathology report site of origin Narrative Laboratory test result     

                             [facility] 

([facility]       

 

                                        No. of containers..01 Sterile Cup SRC:DEREK PEREIRA                  [id number] 

 

           Laboratory test finding (navigational concept) Laboratory test result

                                  

[facility] ([facility]  

 

                                        No. of containers..01 Sterile Cup SRC:DEREK PEREIRA                  [id number] 

 

           Pathology report final diagnosis Narrative Laboratory test result    

                              [facility] 

([facility]       

 

                                        No. of containers..01 Sterile Cup SRC:DEREK PEREIRA                  [id number] 

 

           Laboratory test finding (navigational concept) Laboratory test result

                                  

[facility] ([facility]  

 

                                        No. of containers..01 Sterile Cup SRC:DEREK PEREIRA                  [id number] 

 

             Pathology report comments [Interpretation] Narrative Laboratory kari

t result                           

                          [facility] ([facility]  

 

                                        No. of containers..01 Sterile Cup SRC:DEREK PEREIRA                  [id number] 

 

           Pathologist name Laboratory test result                              

    [facility] ([facility]                        

 

                                        No. of containers..01 Sterile Cup SRC:DEREK PEREIRA                  [id number] 

 

           Pathology report gross observation Narrative Laboratory test result  

                                [facility]

 ([facility]      

 

                                        No. of containers..01 Sterile Cup SRC:DEREK PEREIRA                  [id number] 

 

           Laboratory test finding (navigational concept) Laboratory test result

                                  

[facility] ([facility]  

 

                                        No. of containers..01 Sterile Cup SRC:DEREK PEREIRA                  [id number] 









                    ID                  Date                Data Source

 

                    [id number]         2021 01:49:00 PM EDT [facility] ([facility]









          Name      Value     Range     Interpretation Code Description Data Khloe

rce(s) Supporting 

Document(s)

 

           Urine Culture, Routine Laboratory test result                        

          [facility] ([facility]                        

 

                                        SRC:URINE 

 

           Bacteria identified in Urine by Culture Laboratory test result       

                           [facility] 

([facility]       

 

                                        SRC:URINE 









                    ID                  Date                Data Source

 

                    [id number]         2021 01:48:00 PM EDT [facility] ([facility]









          Name      Value     Range     Interpretation Code Description Data Khloe

rce(s) Supporting 

Document(s)

 

           Comment 1  Laboratory test result                                  ME

DENT ([facility]

                                         

 

           Color Urine Laboratory test result                                  M

[facility] ([facility]                                    

 

          Specific Gravity 1.030     1.00-1.03                     [facility] ([facility] 

 

 

           Appearance of Urine Laboratory test result                           

       [facility] ([facility]                        

 

          PH Urine  6.0       5.0-8.0                       [facility] ([facility]

[facility]  

 

           Bilirubin.total [Presence] in Urine by Test strip Laboratory test res

ult                                  

[facility] ([facility]  

 

           Glucose Urine Laboratory test result                                 

 [facility] ([facility]                                    

 

           Blood Urine Laboratory test result                                  M

[facility] ([facility]                                    

 

           Protein Urine Laboratory test result                                 

 [facility] ([facility]                                    

 

          Ketones   Laboratory test result                               [facility] 

([facility]  

 

          Urobilinogen 1.0 EU/dl 0.2-1.0                       [facility] ([facility]  

 

          Nitrite   Laboratory test result                               [facility] 

([facility]  

 

           Leukocytes Laboratory test result                                  ME

DENT ([facility]                                    









                    ID                  Date                Data Source

 

                    [id number]         2021 08:55:00 AM EST [facility] ([facility])









          Name      Value     Range     Interpretation Code Description Data [facility] campuse(s) Supporting 

Document(s)

 

           Surgical pathology study Laboratory test result                      

            [facility] ([facility], )                            

 

                                        FINAL DIAGNOSIS



Colon, polyps, polypectomy:

Adenomatous polyp/tubular adenoma fragments.

                                        2021 - 1037



CLINICAL DIAGNOSIS



H/O colon polyps

                                        2021 - 



GROSS DIAGNOSIS



Received in formalin labeled "colon polyps" is a 1 x 0.4 x 0.4 cm.

aggregate of polyp fragments.  All in one.

                                        -

                                        2021 - 



Signed________ Lydia Castaneda MD 2021 1208

 









                    ID                  Date                Data Source

 

                    [id number]         2021 09:00:00 AM EST [facility]









          Name      Value     Range     Interpretation Code Description Data [facility]

rce(s) Supporting 

Document(s)

 

          SARS coronavirus 2 RNA                                         [facility] 

    

 

                                        This lab was ordered by [facility] and reported by [facility]. 









                    ID                  Date                Data Source

 

                    [id number]         2020 01:36:00 PM EST [facility] ([facility]









          Name      Value     Range     Interpretation Code Description Data Khloe

rce(s) Supporting 

Document(s)

 

           Hemoglobin A1c/Hemoglobin.total in Blood 6.0 %      4.50-6.20        

                [facility] ([facility]               









                    ID                  Date                Data Source

 

                    [id number]         2020 11:00:00 AM EST [facility] ([facility], )









          Name      Value     Range     Interpretation Code Description Data Khloe

rce(s) Supporting 

Document(s)

 

          PDFReport Laboratory test result                               [facility] 

([facility], )  

 

          FVC-Pre   3.52 L                                  [facility] ([facility])  

 

          FVC-Pred  4.43 L                                  [facility] ([facility])  

 

          FVC-%Pred-Pre 79 L                                    [facility] ([facility], )  

 

          FVC-LLN   3.49 L                                  [facility] ([facility])  

 

          Fev1-Pred 3.26 L                                  [facility] ([facility])  

 

          Fev1-Pre  2.34 L                                  [facility] ([facility])  

 

          Fev1-%Pred-Pre 71 L                                    [facility] ([facility])  

 

          Fev1-LLN  2.47 L                                  [facility] ([facility])  

 

          Fev6-Pred 4.18 L                                  [facility] ([facility])  

 

          Fev6-Pre  3.52 L                                  [facility] ([facility])  

 

          Fev6-%Pred-Pre 84 L                                    [facility] ([facility])  

 

          Fev1fvc-Pre 67 %                                    [facility] ([facility])  

 

          Fev1fvc-Pred 74 %                                    [facility] ([facility])  

 

          Fev6-LLN  3.27 L                                  [facility] ([facility])  

 

          Fev6fvc-Pred 94 %                                    [facility] ([facility])  

 

          Gys9sln-%Pred-Pre 90 %                                    [facility] ([facility])  

 

          Nfy3cua-LCU 64 %                                    [facility] ([facility])  

 

          Zxw1xbg-%Pred-Pre 105 %                                   [facility] ([facility])  

 

          Fev6fvc-Pre 100 %                                   [facility] ([facility])  

 

          FEFMax-Pred 8.36 L/E/sec                               [facility] ([facility])  

 

          FEFMax-%Pred-Pre 69 L/E/sec                               [facility] ([facility])  

 

          FEFMax-LLN 6.04 L/E/sec                               [facility] ([facility])  

 

          FEFMax-Pre 5.83 L/E/sec                               [facility] ([facility])  

 

          Coj9851-%Pred-Pre 53 L/E/sec                               [facility] ([facility])  

 

          Fef2575-Pre 1.31 L/E/sec                               [facility] ([facility])  

 

          Fef2575-Pred 2.47 L/E/sec                               [facility] ([facility])  

 

          Fev1fev6-Pred 78 %                                    [facility] ([facility])  

 

          Qnf6745-CHL 0.87 L/E/sec                               [facility] ([facility])  

 

          ExpTime-Pre 6.31 sec                                [facility] ([facility])  

 

          Fev1fev6-Pre 67 %                                    [facility] ([facility])  

 

          Qsr8mxv9-NIR 69 %                                    [facility] ([facility])  

 

          Fgp0asr0-%Pred-Pre 85 %                                    [facility] ([facility])  









                    ID                  Date                Data Source

 

                    [id number]         2020 12:00:00 AM EST [facility]









          Name      Value     Range     Interpretation Code Description Data Khloe

rce(s) Supporting 

Document(s)

 

          SARS coronavirus 2 Ag                                         [facility]  

   

 

                                        This lab was ordered by [facility] and reported by [facility]. 









                    ID                  Date                Data Source

 

                    [id number]         10/06/2020 02:09:00 PM EDT [facility] ([facility]









          Name      Value     Range     Interpretation Code Description Data Khloe

rce(s) Supporting 

Document(s)

 

                Glucose [Mass/volume] in Capillary blood by Glucometer 144 mg/dL

                 Above 

high normal                             [facility] ([facility]

)  









                    ID                  Date                Data Source

 

                    [id number]         10/06/2020 02:09:00 PM EDT [facility] ([facility])









          Name      Value     Range     Interpretation Code Description Data Khloe

rce(s) Supporting 

Document(s)

 

                Glucose [Mass/volume] in Capillary blood by Glucometer 144 mg/dL

                 Above 

high normal                             [facility] ([facility]) 

 









                    ID                  Date                Data Source

 

                    [id number]         10/06/2020 10:46:00 AM EDT [facility] ([facility]









          Name      Value     Range     Interpretation Code Description Data Khloe

rce(s) Supporting 

Document(s)

 

                Glucose [Mass/volume] in Capillary blood by Glucometer 130 mg/dL

                 Above 

high normal                             [facility] ([facility]

)  









                    ID                  Date                Data Source

 

                    [id number]         10/06/2020 10:46:00 AM EDT [facility] ([facility])









          Name      Value     Range     Interpretation Code Description Data Khloe

rce(s) Supporting 

Document(s)

 

                Glucose [Mass/volume] in Capillary blood by Glucometer 130 mg/dL

                 Above 

high normal                             [facility] ([facility]) 

 









                    ID                  Date                Data Source

 

                    [id number]         10/01/2020 10:00:00 AM EDT [facility]









          Name      Value     Range     Interpretation Code Description Data Khloe

rce(s) Supporting 

Document(s)

 

          SARS coronavirus 2 RNA                                         [facility]

    

 

                                        This lab was ordered by [facility] and reported by [facility]. 









                    ID                  Date                Data Source

 

                    [id number]         2020 01:59:00 PM EDT [facility] ([facility]









          Name      Value     Range     Interpretation Code Description Data Khloe

rce(s) Supporting 

Document(s)

 

           Hemoglobin A1c/Hemoglobin.total in Blood 6.1 %      4.50-6.20        

                [facility] ([facility]               









                    ID                  Date                Data Source

 

                    [id number]         2020 01:59:00 PM EDT [facility] ([facility]









          Name      Value     Range     Interpretation Code Description Data Khloe

rce(s) Supporting 

Document(s)

 

           Glu        198 mg/dL       Above high normal            [facility] 

([facility]                                    

 

                                        NORMAL RANGES 

****************************************************************************** 
Age         WBC      RBC        HGB           HCT         MCV        PLT 
------------------------------------------------------------------------------ 
Adult M   4.1-10.9    4.20-6.30   12.0-18.0   37.0-51.0   80-97      140-440  
Adult F   4.1-10.9    4.04-5.48   12.0-18.0   37.0-51.0   80-97      140-440  0
-1 Yr    5.0-20.0    3.9-5.9     15-18       MV: 44      MV: 91     MV: 277  2-9
 Yr.   6.0-17.0    3.8-5.4     11-13       MV: 37      MV: 78     MV: 300  10 
Yrs.   5.0-13.0    3.8-5.4     12-15       MV: 39      MV: 80     MV: 250    
NOTE:  * FOR ADULT BLACK MALES AND FEMALES, NORMAL WBC IS 2.9-7.7 K/ML  * FOR 
ADULT BLACK MALES AND FEMALES, NORMAL RBC,HGB, AND HCT IS 5% LESS  SOURCE FOR 
DATA: [employer] 1800 OPERATION MANUAL( AUTOMATED BLOOD COUNTS AND DIFF.)  APPENDIX
  B-3                      CHRONIC KIDNEY DISEASE STAGING PER NKF:   MALE GFR 
INTERPRETATION:  20-49 YRS:     >60 mL/min  Normal 50-59 YRS:     >56 mL/min  
Normal 60-69 YRS:     >49 mL/min  Normal 70-79 YRS:     >42 mL/min  Normal 80 
and above  >35 mL/min  Normal   FEMALE GRF INTERPRETATION:  20-39 YRS:    >60 
mL/min  Normal 40-49 YRS:    >58 mL/min  Normal 50-59 YRS:    >51 mL/min  Normal
 60-69 YRS:    >45 mL/min  Normal 70-79 YRS:    >39 mL/min  Normal 80 and above 
>32 mL/min  Normal 

 

          BUN       22 mg/dL  8-                          [facility]

[facility]  

 

                                        NORMAL RANGES 

****************************************************************************** 
Age         WBC      RBC        HGB           HCT         MCV        PLT 
------------------------------------------------------------------------------ 
Adult M   4.1-10.9    4.20-6.30   12.0-18.0   37.0-51.0   80-97      140-440  
Adult F   4.1-10.9    4.04-5.48   12.0-18.0   37.0-51.0   80-97      140-440  0
-1 Yr    5.0-20.0    3.9-5.9     15-18       MV: 44      MV: 91     MV: 277  2-9
 Yr.   6.0-17.0    3.8-5.4     11-13       MV: 37      MV: 78     MV: 300  10 
Yrs.   5.0-13.0    3.8-5.4     12-15       MV: 39      MV: 80     MV: 250    
NOTE:  * FOR ADULT BLACK MALES AND FEMALES, NORMAL WBC IS 2.9-7.7 K/ML  * FOR 
ADULT BLACK MALES AND FEMALES, NORMAL RBC,HGB, AND HCT IS 5% LESS  SOURCE FOR 
DATA: [employer] 1800 OPERATION MANUAL( AUTOMATED BLOOD COUNTS AND DIFF.)  APPENDIX
  B-3                      CHRONIC KIDNEY DISEASE STAGING PER NKF:   MALE GFR 
INTERPRETATION:  20-49 YRS:     >60 mL/min  Normal 50-59 YRS:     >56 mL/min  
Normal 60-69 YRS:     >49 mL/min  Normal 70-79 YRS:     >42 mL/min  Normal 80 
and above  >35 mL/min  Normal   FEMALE GRF INTERPRETATION:  20-39 YRS:    >60 
mL/min  Normal 40-49 YRS:    >58 mL/min  Normal 50-59 YRS:    >51 mL/min  Normal
 60-69 YRS:    >45 mL/min  Normal 70-79 YRS:    >39 mL/min  Normal 80 and above 
>32 mL/min  Normal 

 

          Creat     1.2 mg/dL 0.7-1.2                       [facility] ([facility]

[facility]  

 

                                        NORMAL RANGES 

****************************************************************************** 
Age         WBC      RBC        HGB           HCT         MCV        PLT 
------------------------------------------------------------------------------ 
Adult M   4.1-10.9    4.20-6.30   12.0-18.0   37.0-51.0   80-97      140-440  
Adult F   4.1-10.9    4.04-5.48   12.0-18.0   37.0-51.0   80-97      140-440  0
-1 Yr    5.0-20.0    3.9-5.9     15-18       MV: 44      MV: 91     MV: 277  2-9
 Yr.   6.0-17.0    3.8-5.4     11-13       MV: 37      MV: 78     MV: 300  10 
Yrs.   5.0-13.0    3.8-5.4     12-15       MV: 39      MV: 80     MV: 250    
NOTE:  * FOR ADULT BLACK MALES AND FEMALES, NORMAL WBC IS 2.9-7.7 K/ML  * FOR 
ADULT BLACK MALES AND FEMALES, NORMAL RBC,HGB, AND HCT IS 5% LESS  SOURCE FOR 
DATA: [employer] 1800 OPERATION MANUAL( AUTOMATED BLOOD COUNTS AND DIFF.)  APPENDIX
  B-3                      CHRONIC KIDNEY DISEASE STAGING PER NKF:   MALE GFR 
INTERPRETATION:  20-49 YRS:     >60 mL/min  Normal 50-59 YRS:     >56 mL/min  
Normal 60-69 YRS:     >49 mL/min  Normal 70-79 YRS:     >42 mL/min  Normal 80 
and above  >35 mL/min  Normal   FEMALE GRF INTERPRETATION:  20-39 YRS:    >60 
mL/min  Normal 40-49 YRS:    >58 mL/min  Normal 50-59 YRS:    >51 mL/min  Normal
 60-69 YRS:    >45 mL/min  Normal 70-79 YRS:    >39 mL/min  Normal 80 and above 
>32 mL/min  Normal 

 

          BUN/Creatinine Ratio 18.4 CALC                               [employer] ([facility]  

 

                                        NORMAL RANGES 

****************************************************************************** 
Age         WBC      RBC        HGB           HCT         MCV        PLT 
------------------------------------------------------------------------------ 
Adult M   4.1-10.9    4.20-6.30   12.0-18.0   37.0-51.0   80-97      140-440  
Adult F   4.1-10.9    4.04-5.48   12.0-18.0   37.0-51.0   80-97      140-440  0
-1 Yr    5.0-20.0    3.9-5.9     15-18       MV: 44      MV: 91     MV: 277  2-9
 Yr.   6.0-17.0    3.8-5.4     11-13       MV: 37      MV: 78     MV: 300  10 
Yrs.   5.0-13.0    3.8-5.4     12-15       MV: 39      MV: 80     MV: 250    
NOTE:  * FOR ADULT BLACK MALES AND FEMALES, NORMAL WBC IS 2.9-7.7 K/ML  * FOR 
ADULT BLACK MALES AND FEMALES, NORMAL RBC,HGB, AND HCT IS 5% LESS  SOURCE FOR 
DATA: WILLIAM [employer]YN 1800 OPERATION MANUAL( AUTOMATED BLOOD COUNTS AND DIFF.)  APPENDIX
  B-3                      CHRONIC KIDNEY DISEASE STAGING PER NKF:   MALE GFR 
INTERPRETATION:  20-49 YRS:     >60 mL/min  Normal 50-59 YRS:     >56 mL/min  
Normal 60-69 YRS:     >49 mL/min  Normal 70-79 YRS:     >42 mL/min  Normal 80 
and above  >35 mL/min  Normal   FEMALE GRF INTERPRETATION:  20-39 YRS:    >60 
mL/min  Normal 40-49 YRS:    >58 mL/min  Normal 50-59 YRS:    >51 mL/min  Normal
 60-69 YRS:    >45 mL/min  Normal 70-79 YRS:    >39 mL/min  Normal 80 and above 
>32 mL/min  Normal 

 

          CL        101.1 mmol/L 98.0-107.0                     [facility]  

 

                                        NORMAL RANGES 

****************************************************************************** 
Age         WBC      RBC        HGB           HCT         MCV        PLT 
------------------------------------------------------------------------------ 
Adult M   4.1-10.9    4.20-6.30   12.0-18.0   37.0-51.0   80-97      140-440  
Adult F   4.1-10.9    4.04-5.48   12.0-18.0   37.0-51.0   80-97      140-440  0
-1 Yr    5.0-20.0    3.9-5.9     15-18       MV: 44      MV: 91     MV: 277  2-9
 Yr.   6.0-17.0    3.8-5.4     11-13       MV: 37      MV: 78     MV: 300  10 
Yrs.   5.0-13.0    3.8-5.4     12-15       MV: 39      MV: 80     MV: 250    
NOTE:  * FOR ADULT BLACK MALES AND FEMALES, NORMAL WBC IS 2.9-7.7 K/ML  * FOR 
ADULT BLACK MALES AND FEMALES, NORMAL RBC,HGB, AND HCT IS 5% LESS  SOURCE FOR 
DATA: [employer] 1800 OPERATION MANUAL( AUTOMATED BLOOD COUNTS AND DIFF.)  APPENDIX
  B-3                      CHRONIC KIDNEY DISEASE STAGING PER NKF:   MALE GFR 
INTERPRETATION:  20-49 YRS:     >60 mL/min  Normal 50-59 YRS:     >56 mL/min  
Normal 60-69 YRS:     >49 mL/min  Normal 70-79 YRS:     >42 mL/min  Normal 80 
and above  >35 mL/min  Normal   FEMALE GRF INTERPRETATION:  20-39 YRS:    >60 
mL/min  Normal 40-49 YRS:    >58 mL/min  Normal 50-59 YRS:    >51 mL/min  Normal
 60-69 YRS:    >45 mL/min  Normal 70-79 YRS:    >39 mL/min  Normal 80 and above 
>32 mL/min  Normal 

 

          K         4.0 mmol/L 3.5-5.1                       [facility] ([facility]  

 

                                        NORMAL RANGES 

****************************************************************************** 
Age         WBC      RBC        HGB           HCT         MCV        PLT 
------------------------------------------------------------------------------ 
Adult M   4.1-10.9    4.20-6.30   12.0-18.0   37.0-51.0   80-97      140-440  
Adult F   4.1-10.9    4.04-5.48   12.0-18.0   37.0-51.0   80-97      140-440  0
-1 Yr    5.0-20.0    3.9-5.9     15-18       MV: 44      MV: 91     MV: 277  2-9
 Yr.   6.0-17.0    3.8-5.4     11-13       MV: 37      MV: 78     MV: 300  10 
Yrs.   5.0-13.0    3.8-5.4     12-15       MV: 39      MV: 80     MV: 250    
NOTE:  * FOR ADULT BLACK MALES AND FEMALES, NORMAL WBC IS 2.9-7.7 K/ML  * FOR 
ADULT BLACK MALES AND FEMALES, NORMAL RBC,HGB, AND HCT IS 5% LESS  SOURCE FOR 
DATA: [employer] 1800 OPERATION MANUAL( AUTOMATED BLOOD COUNTS AND DIFF.)  APPENDIX
  B-3                      CHRONIC KIDNEY DISEASE STAGING PER NKF:   MALE GFR 
INTERPRETATION:  20-49 YRS:     >60 mL/min  Normal 50-59 YRS:     >56 mL/min  
Normal 60-69 YRS:     >49 mL/min  Normal 70-79 YRS:     >42 mL/min  Normal 80 
and above  >35 mL/min  Normal   FEMALE GRF INTERPRETATION:  20-39 YRS:    >60 
mL/min  Normal 40-49 YRS:    >58 mL/min  Normal 50-59 YRS:    >51 mL/min  Normal
 60-69 YRS:    >45 mL/min  Normal 70-79 YRS:    >39 mL/min  Normal 80 and above 
>32 mL/min  Normal 

 

          Na        138 mmol/L 136-145                       [facility]  

 

                                        NORMAL RANGES 

****************************************************************************** 
Age         WBC      RBC        HGB           HCT         MCV        PLT 
------------------------------------------------------------------------------ 
Adult M   4.1-10.9    4.20-6.30   12.0-18.0   37.0-51.0   80-97      140-440  
Adult F   4.1-10.9    4.04-5.48   12.0-18.0   37.0-51.0   80-97      140-440  0
-1 Yr    5.0-20.0    3.9-5.9     15-18       MV: 44      MV: 91     MV: 277  2-9
 Yr.   6.0-17.0    3.8-5.4     11-13       MV: 37      MV: 78     MV: 300  10 
Yrs.   5.0-13.0    3.8-5.4     12-15       MV: 39      MV: 80     MV: 250    
NOTE:  * FOR ADULT BLACK MALES AND FEMALES, NORMAL WBC IS 2.9-7.7 K/ML  * FOR 
ADULT BLACK MALES AND FEMALES, NORMAL RBC,HGB, AND HCT IS 5% LESS  SOURCE FOR 
DATA: [employer] 1800 OPERATION MANUAL( AUTOMATED BLOOD COUNTS AND DIFF.)  APPENDIX
  B-3                      CHRONIC KIDNEY DISEASE STAGING PER NKF:   MALE GFR 
INTERPRETATION:  20-49 YRS:     >60 mL/min  Normal 50-59 YRS:     >56 mL/min  
Normal 60-69 YRS:     >49 mL/min  Normal 70-79 YRS:     >42 mL/min  Normal 80 
and above  >35 mL/min  Normal   FEMALE GRF INTERPRETATION:  20-39 YRS:    >60 
mL/min  Normal 40-49 YRS:    >58 mL/min  Normal 50-59 YRS:    >51 mL/min  Normal
 60-69 YRS:    >45 mL/min  Normal 70-79 YRS:    >39 mL/min  Normal 80 and above 
>32 mL/min  Normal 

 

          CA        9.4 mg/dL 8.6-10.2                      [facility]  

 

                                        NORMAL RANGES 

****************************************************************************** 
Age         WBC      RBC        HGB           HCT         MCV        PLT 
------------------------------------------------------------------------------ 
Adult M   4.1-10.9    4.20-6.30   12.0-18.0   37.0-51.0   80-97      140-440  
Adult F   4.1-10.9    4.04-5.48   12.0-18.0   37.0-51.0   80-97      140-440  0
-1 Yr    5.0-20.0    3.9-5.9     15-18       MV: 44      MV: 91     MV: 277  2-9
 Yr.   6.0-17.0    3.8-5.4     11-13       MV: 37      MV: 78     MV: 300  10 
Yrs.   5.0-13.0    3.8-5.4     12-15       MV: 39      MV: 80     MV: 250    
NOTE:  * FOR ADULT BLACK MALES AND FEMALES, NORMAL WBC IS 2.9-7.7 K/ML  * FOR 
ADULT BLACK MALES AND FEMALES, NORMAL RBC,HGB, AND HCT IS 5% LESS  SOURCE FOR 
DATA: [employer] 1800 OPERATION MANUAL( AUTOMATED BLOOD COUNTS AND DIFF.)  APPENDIX
  B-3                      CHRONIC KIDNEY DISEASE STAGING PER NKF:   MALE GFR 
INTERPRETATION:  20-49 YRS:     >60 mL/min  Normal 50-59 YRS:     >56 mL/min  
Normal 60-69 YRS:     >49 mL/min  Normal 70-79 YRS:     >42 mL/min  Normal 80 
and above  >35 mL/min  Normal   FEMALE GRF INTERPRETATION:  20-39 YRS:    >60 
mL/min  Normal 40-49 YRS:    >58 mL/min  Normal 50-59 YRS:    >51 mL/min  Normal
 60-69 YRS:    >45 mL/min  Normal 70-79 YRS:    >39 mL/min  Normal 80 and above 
>32 mL/min  Normal 

 

          Co2       23.7 mmol/L 22.0-29.0                     [facility] ([facility]  

 

                                        NORMAL RANGES 

****************************************************************************** 
Age         WBC      RBC        HGB           HCT         MCV        PLT 
------------------------------------------------------------------------------ 
Adult M   4.1-10.9    4.20-6.30   12.0-18.0   37.0-51.0   80-97      140-440  
Adult F   4.1-10.9    4.04-5.48   12.0-18.0   37.0-51.0   80-97      140-440  0
-1 Yr    5.0-20.0    3.9-5.9     15-18       MV: 44      MV: 91     MV: 277  2-9
 Yr.   6.0-17.0    3.8-5.4     11-13       MV: 37      MV: 78     MV: 300  10 
Yrs.   5.0-13.0    3.8-5.4     12-15       MV: 39      MV: 80     MV: 250    
NOTE:  * FOR ADULT BLACK MALES AND FEMALES, NORMAL WBC IS 2.9-7.7 K/ML  * FOR 
ADULT BLACK MALES AND FEMALES, NORMAL RBC,HGB, AND HCT IS 5% LESS  SOURCE FOR 
DATA: [employer] 1800 OPERATION MANUAL( AUTOMATED BLOOD COUNTS AND DIFF.)  APPENDIX
  B-3                      CHRONIC KIDNEY DISEASE STAGING PER NKF:   MALE GFR 
INTERPRETATION:  20-49 YRS:     >60 mL/min  Normal 50-59 YRS:     >56 mL/min  
Normal 60-69 YRS:     >49 mL/min  Normal 70-79 YRS:     >42 mL/min  Normal 80 
and above  >35 mL/min  Normal   FEMALE GRF INTERPRETATION:  20-39 YRS:    >60 
mL/min  Normal 40-49 YRS:    >58 mL/min  Normal 50-59 YRS:    >51 mL/min  Normal
 60-69 YRS:    >45 mL/min  Normal 70-79 YRS:    >39 mL/min  Normal 80 and above 
>32 mL/min  Normal 

 

           TP         6.5 g/dL   6.6-8.7    Below low normal            [facility]

[facility]

                                         

 

                                        NORMAL RANGES 

****************************************************************************** 
Age         WBC      RBC        HGB           HCT         MCV        PLT 
------------------------------------------------------------------------------ 
Adult M   4.1-10.9    4.20-6.30   12.0-18.0   37.0-51.0   80-97      140-440  
Adult F   4.1-10.9    4.04-5.48   12.0-18.0   37.0-51.0   80-97      140-440  0
-1 Yr    5.0-20.0    3.9-5.9     15-18       MV: 44      MV: 91     MV: 277  2-9
 Yr.   6.0-17.0    3.8-5.4     11-13       MV: 37      MV: 78     MV: 300  10 
Yrs.   5.0-13.0    3.8-5.4     12-15       MV: 39      MV: 80     MV: 250    
NOTE:  * FOR ADULT BLACK MALES AND FEMALES, NORMAL WBC IS 2.9-7.7 K/ML  * FOR 
ADULT BLACK MALES AND FEMALES, NORMAL RBC,HGB, AND HCT IS 5% LESS  SOURCE FOR 
DATA: [employer] 1800 OPERATION MANUAL( AUTOMATED BLOOD COUNTS AND DIFF.)  APPENDIX
  B-3                      CHRONIC KIDNEY DISEASE STAGING PER NKF:   MALE GFR 
INTERPRETATION:  20-49 YRS:     >60 mL/min  Normal 50-59 YRS:     >56 mL/min  
Normal 60-69 YRS:     >49 mL/min  Normal 70-79 YRS:     >42 mL/min  Normal 80 
and above  >35 mL/min  Normal   FEMALE GRF INTERPRETATION:  20-39 YRS:    >60 
mL/min  Normal 40-49 YRS:    >58 mL/min  Normal 50-59 YRS:    >51 mL/min  Normal
 60-69 YRS:    >45 mL/min  Normal 70-79 YRS:    >39 mL/min  Normal 80 and above 
>32 mL/min  Normal 

 

          A/G Ratio 2.2 CALC                                [facility] ([facility]

[facility]  

 

                                        NORMAL RANGES 

****************************************************************************** 
Age         WBC      RBC        HGB           HCT         MCV        PLT 
------------------------------------------------------------------------------ 
Adult M   4.1-10.9    4.20-6.30   12.0-18.0   37.0-51.0   80-97      140-440  
Adult F   4.1-10.9    4.04-5.48   12.0-18.0   37.0-51.0   80-97      140-440  0
-1 Yr    5.0-20.0    3.9-5.9     15-18       MV: 44      MV: 91     MV: 277  2-9
 Yr.   6.0-17.0    3.8-5.4     11-13       MV: 37      MV: 78     MV: 300  10 
Yrs.   5.0-13.0    3.8-5.4     12-15       MV: 39      MV: 80     MV: 250    
NOTE:  * FOR ADULT BLACK MALES AND FEMALES, NORMAL WBC IS 2.9-7.7 K/ML  * FOR 
ADULT BLACK MALES AND FEMALES, NORMAL RBC,HGB, AND HCT IS 5% LESS  SOURCE FOR 
DATA: [employer] 1800 OPERATION MANUAL( AUTOMATED BLOOD COUNTS AND DIFF.)  APPENDIX
  B-3                      CHRONIC KIDNEY DISEASE STAGING PER NKF:   MALE GFR 
INTERPRETATION:  20-49 YRS:     >60 mL/min  Normal 50-59 YRS:     >56 mL/min  
Normal 60-69 YRS:     >49 mL/min  Normal 70-79 YRS:     >42 mL/min  Normal 80 
and above  >35 mL/min  Normal   FEMALE GRF INTERPRETATION:  20-39 YRS:    >60 
mL/min  Normal 40-49 YRS:    >58 mL/min  Normal 50-59 YRS:    >51 mL/min  Normal
 60-69 YRS:    >45 mL/min  Normal 70-79 YRS:    >39 mL/min  Normal 80 and above 
>32 mL/min  Normal 

 

          Alb       4.5 g/dL  3.5-5.2                       [facility] ([facility]

[facility]  

 

                                        NORMAL RANGES 

****************************************************************************** 
Age         WBC      RBC        HGB           HCT         MCV        PLT 
------------------------------------------------------------------------------ 
Adult M   4.1-10.9    4.20-6.30   12.0-18.0   37.0-51.0   80-97      140-440  
Adult F   4.1-10.9    4.04-5.48   12.0-18.0   37.0-51.0   80-97      140-440  0
-1 Yr    5.0-20.0    3.9-5.9     15-18       MV: 44      MV: 91     MV: 277  2-9
 Yr.   6.0-17.0    3.8-5.4     11-13       MV: 37      MV: 78     MV: 300  10 
Yrs.   5.0-13.0    3.8-5.4     12-15       MV: 39      MV: 80     MV: 250    
NOTE:  * FOR ADULT BLACK MALES AND FEMALES, NORMAL WBC IS 2.9-7.7 K/ML  * FOR 
ADULT BLACK MALES AND FEMALES, NORMAL RBC,HGB, AND HCT IS 5% LESS  SOURCE FOR 
DATA: [employer] 1800 OPERATION MANUAL( AUTOMATED BLOOD COUNTS AND DIFF.)  APPENDIX
  B-3                      CHRONIC KIDNEY DISEASE STAGING PER NKF:   MALE GFR 
INTERPRETATION:  20-49 YRS:     >60 mL/min  Normal 50-59 YRS:     >56 mL/min  
Normal 60-69 YRS:     >49 mL/min  Normal 70-79 YRS:     >42 mL/min  Normal 80 
and above  >35 mL/min  Normal   FEMALE GRF INTERPRETATION:  20-39 YRS:    >60 
mL/min  Normal 40-49 YRS:    >58 mL/min  Normal 50-59 YRS:    >51 mL/min  Normal
 60-69 YRS:    >45 mL/min  Normal 70-79 YRS:    >39 mL/min  Normal 80 and above 
>32 mL/min  Normal 

 

          Globulin  2.0 CALC                                [facility]

[facility]  

 

                                        NORMAL RANGES 

****************************************************************************** 
Age         WBC      RBC        HGB           HCT         MCV        PLT 
------------------------------------------------------------------------------ 
Adult M   4.1-10.9    4.20-6.30   12.0-18.0   37.0-51.0   80-97      140-440  
Adult F   4.1-10.9    4.04-5.48   12.0-18.0   37.0-51.0   80-97      140-440  0
-1 Yr    5.0-20.0    3.9-5.9     15-18       MV: 44      MV: 91     MV: 277  2-9
 Yr.   6.0-17.0    3.8-5.4     11-13       MV: 37      MV: 78     MV: 300  10 
Yrs.   5.0-13.0    3.8-5.4     12-15       MV: 39      MV: 80     MV: 250    
NOTE:  * FOR ADULT BLACK MALES AND FEMALES, NORMAL WBC IS 2.9-7.7 K/ML  * FOR 
ADULT BLACK MALES AND FEMALES, NORMAL RBC,HGB, AND HCT IS 5% LESS  SOURCE FOR 
DATA: [employer] 1800 OPERATION MANUAL( AUTOMATED BLOOD COUNTS AND DIFF.)  APPENDIX
  B-3                      CHRONIC KIDNEY DISEASE STAGING PER NKF:   MALE GFR 
INTERPRETATION:  20-49 YRS:     >60 mL/min  Normal 50-59 YRS:     >56 mL/min  
Normal 60-69 YRS:     >49 mL/min  Normal 70-79 YRS:     >42 mL/min  Normal 80 
and above  >35 mL/min  Normal   FEMALE GRF INTERPRETATION:  20-39 YRS:    >60 
mL/min  Normal 40-49 YRS:    >58 mL/min  Normal 50-59 YRS:    >51 mL/min  Normal
 60-69 YRS:    >45 mL/min  Normal 70-79 YRS:    >39 mL/min  Normal 80 and above 
>32 mL/min  Normal 

 

          Alt (SGPT) 13 U/L    0-41                          [facility]  

 

                                        NORMAL RANGES 

****************************************************************************** 
Age         WBC      RBC        HGB           HCT         MCV        PLT 
------------------------------------------------------------------------------ 
Adult M   4.1-10.9    4.20-6.30   12.0-18.0   37.0-51.0   80-97      140-440  
Adult F   4.1-10.9    4.04-5.48   12.0-18.0   37.0-51.0   80-97      140-440  0
-1 Yr    5.0-20.0    3.9-5.9     15-18       MV: 44      MV: 91     MV: 277  2-9
 Yr.   6.0-17.0    3.8-5.4     11-13       MV: 37      MV: 78     MV: 300  10 
Yrs.   5.0-13.0    3.8-5.4     12-15       MV: 39      MV: 80     MV: 250    
NOTE:  * FOR ADULT BLACK MALES AND FEMALES, NORMAL WBC IS 2.9-7.7 K/ML  * FOR 
ADULT BLACK MALES AND FEMALES, NORMAL RBC,HGB, AND HCT IS 5% LESS  SOURCE FOR 
DATA: [employer] 1800 OPERATION MANUAL( AUTOMATED BLOOD COUNTS AND DIFF.)  APPENDIX
  B-3                      CHRONIC KIDNEY DISEASE STAGING PER NKF:   MALE GFR 
INTERPRETATION:  20-49 YRS:     >60 mL/min  Normal 50-59 YRS:     >56 mL/min  
Normal 60-69 YRS:     >49 mL/min  Normal 70-79 YRS:     >42 mL/min  Normal 80 
and above  >35 mL/min  Normal   FEMALE GRF INTERPRETATION:  20-39 YRS:    >60 
mL/min  Normal 40-49 YRS:    >58 mL/min  Normal 50-59 YRS:    >51 mL/min  Normal
 60-69 YRS:    >45 mL/min  Normal 70-79 YRS:    >39 mL/min  Normal 80 and above 
>32 mL/min  Normal 

 

          Alp       81.9 U/L                          [facility] ([facility]

[facility]  

 

                                        NORMAL RANGES 

****************************************************************************** 
Age         WBC      RBC        HGB           HCT         MCV        PLT 
------------------------------------------------------------------------------ 
Adult M   4.1-10.9    4.20-6.30   12.0-18.0   37.0-51.0   80-97      140-440  
Adult F   4.1-10.9    4.04-5.48   12.0-18.0   37.0-51.0   80-97      140-440  0
-1 Yr    5.0-20.0    3.9-5.9     15-18       MV: 44      MV: 91     MV: 277  2-9
 Yr.   6.0-17.0    3.8-5.4     11-13       MV: 37      MV: 78     MV: 300  10 
Yrs.   5.0-13.0    3.8-5.4     12-15       MV: 39      MV: 80     MV: 250    
NOTE:  * FOR ADULT BLACK MALES AND FEMALES, NORMAL WBC IS 2.9-7.7 K/ML  * FOR 
ADULT BLACK MALES AND FEMALES, NORMAL RBC,HGB, AND HCT IS 5% LESS  SOURCE FOR 
DATA: [employer] 1800 OPERATION MANUAL( AUTOMATED BLOOD COUNTS AND DIFF.)  APPENDIX
  B-3                      CHRONIC KIDNEY DISEASE STAGING PER NKF:   MALE GFR 
INTERPRETATION:  20-49 YRS:     >60 mL/min  Normal 50-59 YRS:     >56 mL/min  
Normal 60-69 YRS:     >49 mL/min  Normal 70-79 YRS:     >42 mL/min  Normal 80 
and above  >35 mL/min  Normal   FEMALE GRF INTERPRETATION:  20-39 YRS:    >60 
mL/min  Normal 40-49 YRS:    >58 mL/min  Normal 50-59 YRS:    >51 mL/min  Normal
 60-69 YRS:    >45 mL/min  Normal 70-79 YRS:    >39 mL/min  Normal 80 and above 
>32 mL/min  Normal 

 

           Osmolality-Calculated 283.7 CALC                                  [facility]

[facility] ([facility]

                                         

 

                                        NORMAL RANGES 

****************************************************************************** 
Age         WBC      RBC        HGB           HCT         MCV        PLT 
------------------------------------------------------------------------------ 
Adult M   4.1-10.9    4.20-6.30   12.0-18.0   37.0-51.0   80-97      140-440  
Adult F   4.1-10.9    4.04-5.48   12.0-18.0   37.0-51.0   80-97      140-440  0
-1 Yr    5.0-20.0    3.9-5.9     15-18       MV: 44      MV: 91     MV: 277  2-9
 Yr.   6.0-17.0    3.8-5.4     11-13       MV: 37      MV: 78     MV: 300  10 
Yrs.   5.0-13.0    3.8-5.4     12-15       MV: 39      MV: 80     MV: 250    
NOTE:  * FOR ADULT BLACK MALES AND FEMALES, NORMAL WBC IS 2.9-7.7 K/ML  * FOR 
ADULT BLACK MALES AND FEMALES, NORMAL RBC,HGB, AND HCT IS 5% LESS  SOURCE FOR 
DATA: [employer] 1800 OPERATION MANUAL( AUTOMATED BLOOD COUNTS AND DIFF.)  APPENDIX
  B-3                      CHRONIC KIDNEY DISEASE STAGING PER NKF:   MALE GFR 
INTERPRETATION:  20-49 YRS:     >60 mL/min  Normal 50-59 YRS:     >56 mL/min  
Normal 60-69 YRS:     >49 mL/min  Normal 70-79 YRS:     >42 mL/min  Normal 80 
and above  >35 mL/min  Normal   FEMALE GRF INTERPRETATION:  20-39 YRS:    >60 
mL/min  Normal 40-49 YRS:    >58 mL/min  Normal 50-59 YRS:    >51 mL/min  Normal
 60-69 YRS:    >45 mL/min  Normal 70-79 YRS:    >39 mL/min  Normal 80 and above 
>32 mL/min  Normal 

 

          Ast (Sgot) 12 U/L    0-40                          [facility] ([facility]  

 

                                        NORMAL RANGES 

****************************************************************************** 
Age         WBC      RBC        HGB           HCT         MCV        PLT 
------------------------------------------------------------------------------ 
Adult M   4.1-10.9    4.20-6.30   12.0-18.0   37.0-51.0   80-97      140-440  
Adult F   4.1-10.9    4.04-5.48   12.0-18.0   37.0-51.0   80-97      140-440  0
-1 Yr    5.0-20.0    3.9-5.9     15-18       MV: 44      MV: 91     MV: 277  2-9
 Yr.   6.0-17.0    3.8-5.4     11-13       MV: 37      MV: 78     MV: 300  10 
Yrs.   5.0-13.0    3.8-5.4     12-15       MV: 39      MV: 80     MV: 250    
NOTE:  * FOR ADULT BLACK MALES AND FEMALES, NORMAL WBC IS 2.9-7.7 K/ML  * FOR 
ADULT BLACK MALES AND FEMALES, NORMAL RBC,HGB, AND HCT IS 5% LESS  SOURCE FOR 
DATA: [employer] 1800 OPERATION MANUAL( AUTOMATED BLOOD COUNTS AND DIFF.)  APPENDIX
  B-3                      CHRONIC KIDNEY DISEASE STAGING PER NKF:   MALE GFR 
INTERPRETATION:  20-49 YRS:     >60 mL/min  Normal 50-59 YRS:     >56 mL/min  
Normal 60-69 YRS:     >49 mL/min  Normal 70-79 YRS:     >42 mL/min  Normal 80 
and above  >35 mL/min  Normal   FEMALE GRF INTERPRETATION:  20-39 YRS:    >60 
mL/min  Normal 40-49 YRS:    >58 mL/min  Normal 50-59 YRS:    >51 mL/min  Normal
 60-69 YRS:    >45 mL/min  Normal 70-79 YRS:    >39 mL/min  Normal 80 and above 
>32 mL/min  Normal 

 

          Tbili     0.60 mg/dL 0.0-1.2                       [facility] ([facility]

[facility]  

 

                                        NORMAL RANGES 

****************************************************************************** 
Age         WBC      RBC        HGB           HCT         MCV        PLT 
------------------------------------------------------------------------------ 
Adult M   4.1-10.9    4.20-6.30   12.0-18.0   37.0-51.0   80-97      140-440  
Adult F   4.1-10.9    4.04-5.48   12.0-18.0   37.0-51.0   80-97      140-440  0
-1 Yr    5.0-20.0    3.9-5.9     15-18       MV: 44      MV: 91     MV: 277  2-9
 Yr.   6.0-17.0    3.8-5.4     11-13       MV: 37      MV: 78     MV: 300  10 
Yrs.   5.0-13.0    3.8-5.4     12-15       MV: 39      MV: 80     MV: 250    
NOTE:  * FOR ADULT BLACK MALES AND FEMALES, NORMAL WBC IS 2.9-7.7 K/ML  * FOR 
ADULT BLACK MALES AND FEMALES, NORMAL RBC,HGB, AND HCT IS 5% LESS  SOURCE FOR 
DATA: [employer] 1800 OPERATION MANUAL( AUTOMATED BLOOD COUNTS AND DIFF.)  APPENDIX
  B-3                      CHRONIC KIDNEY DISEASE STAGING PER NKF:   MALE GFR 
INTERPRETATION:  20-49 YRS:     >60 mL/min  Normal 50-59 YRS:     >56 mL/min  
Normal 60-69 YRS:     >49 mL/min  Normal 70-79 YRS:     >42 mL/min  Normal 80 
and above  >35 mL/min  Normal   FEMALE GRF INTERPRETATION:  20-39 YRS:    >60 
mL/min  Normal 40-49 YRS:    >58 mL/min  Normal 50-59 YRS:    >51 mL/min  Normal
 60-69 YRS:    >45 mL/min  Normal 70-79 YRS:    >39 mL/min  Normal 80 and above 
>32 mL/min  Normal 

 

          eGFR Non-Afr. American 61 #                                    [facility] 

([facility]  

 

                                        NORMAL RANGES 

****************************************************************************** 
Age         WBC      RBC        HGB           HCT         MCV        PLT 
------------------------------------------------------------------------------ 
Adult M   4.1-10.9    4.20-6.30   12.0-18.0   37.0-51.0   80-97      140-440  
Adult F   4.1-10.9    4.04-5.48   12.0-18.0   37.0-51.0   80-97      140-440  0
-1 Yr    5.0-20.0    3.9-5.9     15-18       MV: 44      MV: 91     MV: 277  2-9
 Yr.   6.0-17.0    3.8-5.4     11-13       MV: 37      MV: 78     MV: 300  10 
Yrs.   5.0-13.0    3.8-5.4     12-15       MV: 39      MV: 80     MV: 250    
NOTE:  * FOR ADULT BLACK MALES AND FEMALES, NORMAL WBC IS 2.9-7.7 K/ML  * FOR 
ADULT BLACK MALES AND FEMALES, NORMAL RBC,HGB, AND HCT IS 5% LESS  SOURCE FOR 
DATA: [employer] 1800 OPERATION MANUAL( AUTOMATED BLOOD COUNTS AND DIFF.)  APPENDIX
  B-3                      CHRONIC KIDNEY DISEASE STAGING PER NKF:   MALE GFR 
INTERPRETATION:  20-49 YRS:     >60 mL/min  Normal 50-59 YRS:     >56 mL/min  
Normal 60-69 YRS:     >49 mL/min  Normal 70-79 YRS:     >42 mL/min  Normal 80 
and above  >35 mL/min  Normal   FEMALE GRF INTERPRETATION:  20-39 YRS:    >60 
mL/min  Normal 40-49 YRS:    >58 mL/min  Normal 50-59 YRS:    >51 mL/min  Normal
 60-69 YRS:    >45 mL/min  Normal 70-79 YRS:    >39 mL/min  Normal 80 and above 
>32 mL/min  Normal 

 

          Anion Gap 17 mmol/L                               [facility]  

 

                                        NORMAL RANGES 

****************************************************************************** 
Age         WBC      RBC        HGB           HCT         MCV        PLT 
------------------------------------------------------------------------------ 
Adult M   4.1-10.9    4.20-6.30   12.0-18.0   37.0-51.0   80-97      140-440  
Adult F   4.1-10.9    4.04-5.48   12.0-18.0   37.0-51.0   80-97      140-440  0
-1 Yr    5.0-20.0    3.9-5.9     15-18       MV: 44      MV: 91     MV: 277  2-9
 Yr.   6.0-17.0    3.8-5.4     11-13       MV: 37      MV: 78     MV: 300  10 
Yrs.   5.0-13.0    3.8-5.4     12-15       MV: 39      MV: 80     MV: 250    
NOTE:  * FOR ADULT BLACK MALES AND FEMALES, NORMAL WBC IS 2.9-7.7 K/ML  * FOR 
ADULT BLACK MALES AND FEMALES, NORMAL RBC,HGB, AND HCT IS 5% LESS  SOURCE FOR 
DATA: [employer] 1800 OPERATION MANUAL( AUTOMATED BLOOD COUNTS AND DIFF.)  APPENDIX
  B-3                      CHRONIC KIDNEY DISEASE STAGING PER NKF:   MALE GFR 
INTERPRETATION:  20-49 YRS:     >60 mL/min  Normal 50-59 YRS:     >56 mL/min  
Normal 60-69 YRS:     >49 mL/min  Normal 70-79 YRS:     >42 mL/min  Normal 80 
and above  >35 mL/min  Normal   FEMALE GRF INTERPRETATION:  20-39 YRS:    >60 
mL/min  Normal 40-49 YRS:    >58 mL/min  Normal 50-59 YRS:    >51 mL/min  Normal
 60-69 YRS:    >45 mL/min  Normal 70-79 YRS:    >39 mL/min  Normal 80 and above 
>32 mL/min  Normal 

 

          eGFR  71 #                                    [facility] (

[facility]  

 

                                        NORMAL RANGES 

****************************************************************************** 
Age         WBC      RBC        HGB           HCT         MCV        PLT 
------------------------------------------------------------------------------ 
Adult M   4.1-10.9    4.20-6.30   12.0-18.0   37.0-51.0   80-97      140-440  
Adult F   4.1-10.9    4.04-5.48   12.0-18.0   37.0-51.0   80-97      140-440  0
-1 Yr    5.0-20.0    3.9-5.9     15-18       MV: 44      MV: 91     MV: 277  2-9
 Yr.   6.0-17.0    3.8-5.4     11-13       MV: 37      MV: 78     MV: 300  10 
Yrs.   5.0-13.0    3.8-5.4     12-15       MV: 39      MV: 80     MV: 250    
NOTE:  * FOR ADULT BLACK MALES AND FEMALES, NORMAL WBC IS 2.9-7.7 K/ML  * FOR 
ADULT BLACK MALES AND FEMALES, NORMAL RBC,HGB, AND HCT IS 5% LESS  SOURCE FOR 
DATA: [employer] 1800 OPERATION MANUAL( AUTOMATED BLOOD COUNTS AND DIFF.)  APPENDIX
  B-3                      CHRONIC KIDNEY DISEASE STAGING PER NKF:   MALE GFR 
INTERPRETATION:  20-49 YRS:     >60 mL/min  Normal 50-59 YRS:     >56 mL/min  
Normal 60-69 YRS:     >49 mL/min  Normal 70-79 YRS:     >42 mL/min  Normal 80 
and above  >35 mL/min  Normal   FEMALE GRF INTERPRETATION:  20-39 YRS:    >60 
mL/min  Normal 40-49 YRS:    >58 mL/min  Normal 50-59 YRS:    >51 mL/min  Normal
 60-69 YRS:    >45 mL/min  Normal 70-79 YRS:    >39 mL/min  Normal 80 and above 
>32 mL/min  Normal 









                    ID                  Date                Data Source

 

                    [id number]         2020 01:59:00 PM EDT [facility] ([facility]









          Name      Value     Range     Interpretation Code Description Data Khloe

rce(s) Supporting 

Document(s)

 

          RBC       5.12 10E6/uL                      [facility] ([facility]

[facility]  

 

                                        NORMAL RANGES 

****************************************************************************** 
Age         WBC      RBC        HGB           HCT         MCV        PLT 
------------------------------------------------------------------------------ 
Adult M   4.1-10.9    4.20-6.30   12.0-18.0   37.0-51.0   80-97      140-440  
Adult F   4.1-10.9    4.04-5.48   12.0-18.0   37.0-51.0   80-97      140-440  0
-1 Yr    5.0-20.0    3.9-5.9     15-18       MV: 44      MV: 91     MV: 277  2-9
 Yr.   6.0-17.0    3.8-5.4     11-13       MV: 37      MV: 78     MV: 300  10 
Yrs.   5.0-13.0    3.8-5.4     12-15       MV: 39      MV: 80     MV: 250    
NOTE:  * FOR ADULT BLACK MALES AND FEMALES, NORMAL WBC IS 2.9-7.7 K/ML  * FOR 
ADULT BLACK MALES AND FEMALES, NORMAL RBC,HGB, AND HCT IS 5% LESS  SOURCE FOR 
DATA: [employer] 1800 OPERATION MANUAL( AUTOMATED BLOOD COUNTS AND DIFF.)  APPENDIX
  B-3                      CHRONIC KIDNEY DISEASE STAGING PER NKF:   MALE GFR 
INTERPRETATION:  20-49 YRS:     >60 mL/min  Normal 50-59 YRS:     >56 mL/min  
Normal 60-69 YRS:     >49 mL/min  Normal 70-79 YRS:     >42 mL/min  Normal 80 
and above  >35 mL/min  Normal   FEMALE GRF INTERPRETATION:  20-39 YRS:    >60 
mL/min  Normal 40-49 YRS:    >58 mL/min  Normal 50-59 YRS:    >51 mL/min  Normal
 60-69 YRS:    >45 mL/min  Normal 70-79 YRS:    >39 mL/min  Normal 80 and above 
>32 mL/min  Normal 

 

          WBC       7.9 10E3/uL 4.1-10.9                      [employer] ([facility]  

 

                                        NORMAL RANGES 

****************************************************************************** 
Age         WBC      RBC        HGB           HCT         MCV        PLT 
------------------------------------------------------------------------------ 
Adult M   4.1-10.9    4.20-6.30   12.0-18.0   37.0-51.0   80-97      140-440  
Adult F   4.1-10.9    4.04-5.48   12.0-18.0   37.0-51.0   80-97      140-440  0
-1 Yr    5.0-20.0    3.9-5.9     15-18       MV: 44      MV: 91     MV: 277  2-9
 Yr.   6.0-17.0    3.8-5.4     11-13       MV: 37      MV: 78     MV: 300  10 
Yrs.   5.0-13.0    3.8-5.4     12-15       MV: 39      MV: 80     MV: 250    
NOTE:  * FOR ADULT BLACK MALES AND FEMALES, NORMAL WBC IS 2.9-7.7 K/ML  * FOR 
ADULT BLACK MALES AND FEMALES, NORMAL RBC,HGB, AND HCT IS 5% LESS  SOURCE FOR 
DATA: [employer] 1800 OPERATION MANUAL( AUTOMATED BLOOD COUNTS AND DIFF.)  APPENDIX
  B-3                      CHRONIC KIDNEY DISEASE STAGING PER NKF:   MALE GFR 
INTERPRETATION:  20-49 YRS:     >60 mL/min  Normal 50-59 YRS:     >56 mL/min  
Normal 60-69 YRS:     >49 mL/min  Normal 70-79 YRS:     >42 mL/min  Normal 80 
and above  >35 mL/min  Normal   FEMALE GRF INTERPRETATION:  20-39 YRS:    >60 
mL/min  Normal 40-49 YRS:    >58 mL/min  Normal 50-59 YRS:    >51 mL/min  Normal
 60-69 YRS:    >45 mL/min  Normal 70-79 YRS:    >39 mL/min  Normal 80 and above 
>32 mL/min  Normal 

 

          HGB       15.9 g/dL 12.0-18.0                     [facility] ([facility]

[facility]  

 

                                        NORMAL RANGES 

****************************************************************************** 
Age         WBC      RBC        HGB           HCT         MCV        PLT 
------------------------------------------------------------------------------ 
Adult M   4.1-10.9    4.20-6.30   12.0-18.0   37.0-51.0   80-97      140-440  
Adult F   4.1-10.9    4.04-5.48   12.0-18.0   37.0-51.0   80-97      140-440  0
-1 Yr    5.0-20.0    3.9-5.9     15-18       MV: 44      MV: 91     MV: 277  2-9
 Yr.   6.0-17.0    3.8-5.4     11-13       MV: 37      MV: 78     MV: 300  10 
Yrs.   5.0-13.0    3.8-5.4     12-15       MV: 39      MV: 80     MV: 250    
NOTE:  * FOR ADULT BLACK MALES AND FEMALES, NORMAL WBC IS 2.9-7.7 K/ML  * FOR 
ADULT BLACK MALES AND FEMALES, NORMAL RBC,HGB, AND HCT IS 5% LESS  SOURCE FOR 
DATA: [employer] 1800 OPERATION MANUAL( AUTOMATED BLOOD COUNTS AND DIFF.)  APPENDIX
  B-3                      CHRONIC KIDNEY DISEASE STAGING PER NKF:   MALE GFR 
INTERPRETATION:  20-49 YRS:     >60 mL/min  Normal 50-59 YRS:     >56 mL/min  
Normal 60-69 YRS:     >49 mL/min  Normal 70-79 YRS:     >42 mL/min  Normal 80 
and above  >35 mL/min  Normal   FEMALE GRF INTERPRETATION:  20-39 YRS:    >60 
mL/min  Normal 40-49 YRS:    >58 mL/min  Normal 50-59 YRS:    >51 mL/min  Normal
 60-69 YRS:    >45 mL/min  Normal 70-79 YRS:    >39 mL/min  Normal 80 and above 
>32 mL/min  Normal 

 

          MCV       90.0 fL   80.0-97.0                     [facility]  

 

                                        NORMAL RANGES 

****************************************************************************** 
Age         WBC      RBC        HGB           HCT         MCV        PLT 
------------------------------------------------------------------------------ 
Adult M   4.1-10.9    4.20-6.30   12.0-18.0   37.0-51.0   80-97      140-440  
Adult F   4.1-10.9    4.04-5.48   12.0-18.0   37.0-51.0   80-97      140-440  0
-1 Yr    5.0-20.0    3.9-5.9     15-18       MV: 44      MV: 91     MV: 277  2-9
 Yr.   6.0-17.0    3.8-5.4     11-13       MV: 37      MV: 78     MV: 300  10 
Yrs.   5.0-13.0    3.8-5.4     12-15       MV: 39      MV: 80     MV: 250    
NOTE:  * FOR ADULT BLACK MALES AND FEMALES, NORMAL WBC IS 2.9-7.7 K/ML  * FOR 
ADULT BLACK MALES AND FEMALES, NORMAL RBC,HGB, AND HCT IS 5% LESS  SOURCE FOR 
DATA: [employer] 1800 OPERATION MANUAL( AUTOMATED BLOOD COUNTS AND DIFF.)  APPENDIX
  B-3                      CHRONIC KIDNEY DISEASE STAGING PER NKF:   MALE GFR 
INTERPRETATION:  20-49 YRS:     >60 mL/min  Normal 50-59 YRS:     >56 mL/min  
Normal 60-69 YRS:     >49 mL/min  Normal 70-79 YRS:     >42 mL/min  Normal 80 
and above  >35 mL/min  Normal   FEMALE GRF INTERPRETATION:  20-39 YRS:    >60 
mL/min  Normal 40-49 YRS:    >58 mL/min  Normal 50-59 YRS:    >51 mL/min  Normal
 60-69 YRS:    >45 mL/min  Normal 70-79 YRS:    >39 mL/min  Normal 80 and above 
>32 mL/min  Normal 

 

          HCT       46.1 %    37.0-51.0                     [facility] ([facility]  

 

                                        NORMAL RANGES 

****************************************************************************** 
Age         WBC      RBC        HGB           HCT         MCV        PLT 
------------------------------------------------------------------------------ 
Adult M   4.1-10.9    4.20-6.30   12.0-18.0   37.0-51.0   80-97      140-440  
Adult F   4.1-10.9    4.04-5.48   12.0-18.0   37.0-51.0   80-97      140-440  0
-1 Yr    5.0-20.0    3.9-5.9     15-18       MV: 44      MV: 91     MV: 277  2-9
 Yr.   6.0-17.0    3.8-5.4     11-13       MV: 37      MV: 78     MV: 300  10 
Yrs.   5.0-13.0    3.8-5.4     12-15       MV: 39      MV: 80     MV: 250    
NOTE:  * FOR ADULT BLACK MALES AND FEMALES, NORMAL WBC IS 2.9-7.7 K/ML  * FOR 
ADULT BLACK MALES AND FEMALES, NORMAL RBC,HGB, AND HCT IS 5% LESS  SOURCE FOR 
DATA: [employer] 1800 OPERATION MANUAL( AUTOMATED BLOOD COUNTS AND DIFF.)  APPENDIX
  B-3                      CHRONIC KIDNEY DISEASE STAGING PER NKF:   MALE GFR 
INTERPRETATION:  20-49 YRS:     >60 mL/min  Normal 50-59 YRS:     >56 mL/min  
Normal 60-69 YRS:     >49 mL/min  Normal 70-79 YRS:     >42 mL/min  Normal 80 
and above  >35 mL/min  Normal   FEMALE GRF INTERPRETATION:  20-39 YRS:    >60 
mL/min  Normal 40-49 YRS:    >58 mL/min  Normal 50-59 YRS:    >51 mL/min  Normal
 60-69 YRS:    >45 mL/min  Normal 70-79 YRS:    >39 mL/min  Normal 80 and above 
>32 mL/min  Normal 

 

          PLT       186 10E3/uL 140-440                       [facility] ([facility]  

 

                                        NORMAL RANGES 

****************************************************************************** 
Age         WBC      RBC        HGB           HCT         MCV        PLT 
------------------------------------------------------------------------------ 
Adult M   4.1-10.9    4.20-6.30   12.0-18.0   37.0-51.0   80-97      140-440  
Adult F   4.1-10.9    4.04-5.48   12.0-18.0   37.0-51.0   80-97      140-440  0
-1 Yr    5.0-20.0    3.9-5.9     15-18       MV: 44      MV: 91     MV: 277  2-9
 Yr.   6.0-17.0    3.8-5.4     11-13       MV: 37      MV: 78     MV: 300  10 
Yrs.   5.0-13.0    3.8-5.4     12-15       MV: 39      MV: 80     MV: 250    
NOTE:  * FOR ADULT BLACK MALES AND FEMALES, NORMAL WBC IS 2.9-7.7 K/ML  * FOR 
ADULT BLACK MALES AND FEMALES, NORMAL RBC,HGB, AND HCT IS 5% LESS  SOURCE FOR 
DATA: [employer] OPERATION MANUAL( AUTOMATED BLOOD COUNTS AND DIFF.)  APPENDIX
  B-3                      CHRONIC KIDNEY DISEASE STAGING PER NKF:   MALE GFR 
INTERPRETATION:  20-49 YRS:     >60 mL/min  Normal 50-59 YRS:     >56 mL/min  
Normal 60-69 YRS:     >49 mL/min  Normal 70-79 YRS:     >42 mL/min  Normal 80 
and above  >35 mL/min  Normal   FEMALE GRF INTERPRETATION:  20-39 YRS:    >60 
mL/min  Normal 40-49 YRS:    >58 mL/min  Normal 50-59 YRS:    >51 mL/min  Normal
 60-69 YRS:    >45 mL/min  Normal 70-79 YRS:    >39 mL/min  Normal 80 and above 
>32 mL/min  Normal 

 

          MCHC      34.5 g/dL 31.0-36.0                     [facility] ([facility]  

 

                                        NORMAL RANGES 

****************************************************************************** 
Age         WBC      RBC        HGB           HCT         MCV        PLT 
------------------------------------------------------------------------------ 
Adult M   4.1-10.9    4.20-6.30   12.0-18.0   37.0-51.0   80-97      140-440  
Adult F   4.1-10.9    4.04-5.48   12.0-18.0   37.0-51.0   80-97      140-440  0
-1 Yr    5.0-20.0    3.9-5.9     15-18       MV: 44      MV: 91     MV: 277  2-9
 Yr.   6.0-17.0    3.8-5.4     11-13       MV: 37      MV: 78     MV: 300  10 
Yrs.   5.0-13.0    3.8-5.4     12-15       MV: 39      MV: 80     MV: 250    
NOTE:  * FOR ADULT BLACK MALES AND FEMALES, NORMAL WBC IS 2.9-7.7 K/ML  * FOR 
ADULT BLACK MALES AND FEMALES, NORMAL RBC,HGB, AND HCT IS 5% LESS  SOURCE FOR 
DATA: [employer] OPERATION MANUAL( AUTOMATED BLOOD COUNTS AND DIFF.)  APPENDIX
  B-3                      CHRONIC KIDNEY DISEASE STAGING PER NKF:   MALE GFR 
INTERPRETATION:  20-49 YRS:     >60 mL/min  Normal 50-59 YRS:     >56 mL/min  
Normal 60-69 YRS:     >49 mL/min  Normal 70-79 YRS:     >42 mL/min  Normal 80 
and above  >35 mL/min  Normal   FEMALE GRF INTERPRETATION:  20-39 YRS:    >60 
mL/min  Normal 40-49 YRS:    >58 mL/min  Normal 50-59 YRS:    >51 mL/min  Normal
 60-69 YRS:    >45 mL/min  Normal 70-79 YRS:    >39 mL/min  Normal 80 and above 
>32 mL/min  Normal 

 

          MCH       31.1 pg   26.0-32.0                     [facility] ([facility]  

 

                                        NORMAL RANGES 

****************************************************************************** 
Age         WBC      RBC        HGB           HCT         MCV        PLT 
------------------------------------------------------------------------------ 
Adult M   4.1-10.9    4.20-6.30   12.0-18.0   37.0-51.0   80-97      140-440  
Adult F   4.1-10.9    4.04-5.48   12.0-18.0   37.0-51.0   80-97      140-440  0
-1 Yr    5.0-20.0    3.9-5.9     15-18       MV: 44      MV: 91     MV: 277  2-9
 Yr.   6.0-17.0    3.8-5.4     11-13       MV: 37      MV: 78     MV: 300  10 
Yrs.   5.0-13.0    3.8-5.4     12-15       MV: 39      MV: 80     MV: 250    
NOTE:  * FOR ADULT BLACK MALES AND FEMALES, NORMAL WBC IS 2.9-7.7 K/ML  * FOR 
ADULT BLACK MALES AND FEMALES, NORMAL RBC,HGB, AND HCT IS 5% LESS  SOURCE FOR 
DATA: [employer]PERATION MANUAL( AUTOMATED BLOOD COUNTS AND DIFF.)  APPENDIX
  B-3                      CHRONIC KIDNEY DISEASE STAGING PER NKF:   MALE GFR 
INTERPRETATION:  20-49 YRS:     >60 mL/min  Normal 50-59 YRS:     >56 mL/min  
Normal 60-69 YRS:     >49 mL/min  Normal 70-79 YRS:     >42 mL/min  Normal 80 
and above  >35 mL/min  Normal   FEMALE GRF INTERPRETATION:  20-39 YRS:    >60 
mL/min  Normal 40-49 YRS:    >58 mL/min  Normal 50-59 YRS:    >51 mL/min  Normal
 60-69 YRS:    >45 mL/min  Normal 70-79 YRS:    >39 mL/min  Normal 80 and above 
>32 mL/min  Normal 

 

          Lym%      22.0 %    10.0-58.5                     [facility]

[facility]  

 

                                        NORMAL RANGES 

****************************************************************************** 
Age         WBC      RBC        HGB           HCT         MCV        PLT 
------------------------------------------------------------------------------ 
Adult M   4.1-10.9    4.20-6.30   12.0-18.0   37.0-51.0   80-97      140-440  
Adult F   4.1-10.9    4.04-5.48   12.0-18.0   37.0-51.0   80-97      140-440  0
-1 Yr    5.0-20.0    3.9-5.9     15-18       MV: 44      MV: 91     MV: 277  2-9
 Yr.   6.0-17.0    3.8-5.4     11-13       MV: 37      MV: 78     MV: 300  10 
Yrs.   5.0-13.0    3.8-5.4     12-15       MV: 39      MV: 80     MV: 250    
NOTE:  * FOR ADULT BLACK MALES AND FEMALES, NORMAL WBC IS 2.9-7.7 K/ML  * FOR 
ADULT BLACK MALES AND FEMALES, NORMAL RBC,HGB, AND HCT IS 5% LESS  SOURCE FOR 
DATA: [employer]PERATION MANUAL( AUTOMATED BLOOD COUNTS AND DIFF.)  APPENDIX
  B-3                      CHRONIC KIDNEY DISEASE STAGING PER NKF:   MALE GFR 
INTERPRETATION:  20-49 YRS:     >60 mL/min  Normal 50-59 YRS:     >56 mL/min  
Normal 60-69 YRS:     >49 mL/min  Normal 70-79 YRS:     >42 mL/min  Normal 80 
and above  >35 mL/min  Normal   FEMALE GRF INTERPRETATION:  20-39 YRS:    >60 
mL/min  Normal 40-49 YRS:    >58 mL/min  Normal 50-59 YRS:    >51 mL/min  Normal
 60-69 YRS:    >45 mL/min  Normal 70-79 YRS:    >39 mL/min  Normal 80 and above 
>32 mL/min  Normal 

 

          RDW-CV    14.2 %    11.5-14.5                     [facility] ([facility]

[facility]  

 

                                        NORMAL RANGES 

****************************************************************************** 
Age         WBC      RBC        HGB           HCT         MCV        PLT 
------------------------------------------------------------------------------ 
Adult M   4.1-10.9    4.20-6.30   12.0-18.0   37.0-51.0   80-97      140-440  
Adult F   4.1-10.9    4.04-5.48   12.0-18.0   37.0-51.0   80-97      140-440  0
-1 Yr    5.0-20.0    3.9-5.9     15-18       MV: 44      MV: 91     MV: 277  2-9
 Yr.   6.0-17.0    3.8-5.4     11-13       MV: 37      MV: 78     MV: 300  10 
Yrs.   5.0-13.0    3.8-5.4     12-15       MV: 39      MV: 80     MV: 250    
NOTE:  * FOR ADULT BLACK MALES AND FEMALES, NORMAL WBC IS 2.9-7.7 K/ML  * FOR 
ADULT BLACK MALES AND FEMALES, NORMAL RBC,HGB, AND HCT IS 5% LESS  SOURCE FOR 
DATA: [employer]PERATION MANUAL( AUTOMATED BLOOD COUNTS AND DIFF.)  APPENDIX
  B-3                      CHRONIC KIDNEY DISEASE STAGING PER NKF:   MALE GFR 
INTERPRETATION:  20-49 YRS:     >60 mL/min  Normal 50-59 YRS:     >56 mL/min  
Normal 60-69 YRS:     >49 mL/min  Normal 70-79 YRS:     >42 mL/min  Normal 80 
and above  >35 mL/min  Normal   FEMALE GRF INTERPRETATION:  20-39 YRS:    >60 
mL/min  Normal 40-49 YRS:    >58 mL/min  Normal 50-59 YRS:    >51 mL/min  Normal
 60-69 YRS:    >45 mL/min  Normal 70-79 YRS:    >39 mL/min  Normal 80 and above 
>32 mL/min  Normal 

 

          Lym#      1.7 10E3/uL 0.6-4.1                       [employer] ([facility]  

 

                                        NORMAL RANGES 

****************************************************************************** 
Age         WBC      RBC        HGB           HCT         MCV        PLT 
------------------------------------------------------------------------------ 
Adult M   4.1-10.9    4.20-6.30   12.0-18.0   37.0-51.0   80-97      140-440  
Adult F   4.1-10.9    4.04-5.48   12.0-18.0   37.0-51.0   80-97      140-440  0
-1 Yr    5.0-20.0    3.9-5.9     15-18       MV: 44      MV: 91     MV: 277  2-9
 Yr.   6.0-17.0    3.8-5.4     11-13       MV: 37      MV: 78     MV: 300  10 
Yrs.   5.0-13.0    3.8-5.4     12-15       MV: 39      MV: 80     MV: 250    
NOTE:  * FOR ADULT BLACK MALES AND FEMALES, NORMAL WBC IS 2.9-7.7 K/ML  * FOR 
ADULT BLACK MALES AND FEMALES, NORMAL RBC,HGB, AND HCT IS 5% LESS  SOURCE FOR 
DATA: [employer] MAN[employer]( AUTOMATED BLOOD COUNTS AND DIFF.)  APPENDIX
  B-3                      CHRONIC KIDNEY DISEASE STAGING PER NKF:   MALE GFR 
INTERPRETATION:  20-49 YRS:     >60 mL/min  Normal 50-59 YRS:     >56 mL/min  
Normal 60-69 YRS:     >49 mL/min  Normal 70-79 YRS:     >42 mL/min  Normal 80 
and above  >35 mL/min  Normal   FEMALE GRF INTERPRETATION:  20-39 YRS:    >60 
mL/min  Normal 40-49 YRS:    >58 mL/min  Normal 50-59 YRS:    >51 mL/min  Normal
 60-69 YRS:    >45 mL/min  Normal 70-79 YRS:    >39 mL/min  Normal 80 and above 
>32 mL/min  Normal 

 

          MXD%      5.5 %     0.1-24.0                      [facility] ([facility]

[facility]  

 

                                        NORMAL RANGES 

****************************************************************************** 
Age         WBC      RBC        HGB           HCT         MCV        PLT 
------------------------------------------------------------------------------ 
Adult M   4.1-10.9    4.20-6.30   12.0-18.0   37.0-51.0   80-97      140-440  
Adult F   4.1-10.9    4.04-5.48   12.0-18.0   37.0-51.0   80-97      140-440  0
-1 Yr    5.0-20.0    3.9-5.9     15-18       MV: 44      MV: 91     MV: 277  2-9
 Yr.   6.0-17.0    3.8-5.4     11-13       MV: 37      MV: 78     MV: 300  10 
Yrs.   5.0-13.0    3.8-5.4     12-15       MV: 39      MV: 80     MV: 250    
NOTE:  * FOR ADULT BLACK MALES AND FEMALES, NORMAL WBC IS 2.9-7.7 K/ML  * FOR 
ADULT BLACK MALES AND FEMALES, NORMAL RBC,HGB, AND HCT IS 5% LESS  SOURCE FOR 
DATA: [employer] MANUAL( AUTOMATED BLOOD COUNTS AND DIFF.)  APPENDIX
  B-3                      CHRONIC KIDNEY DISEASE STAGING PER NKF:   MALE GFR 
INTERPRETATION:  20-49 YRS:     >60 mL/min  Normal 50-59 YRS:     >56 mL/min  
Normal 60-69 YRS:     >49 mL/min  Normal 70-79 YRS:     >42 mL/min  Normal 80 
and above  >35 mL/min  Normal   FEMALE GRF INTERPRETATION:  20-39 YRS:    >60 
mL/min  Normal 40-49 YRS:    >58 mL/min  Normal 50-59 YRS:    >51 mL/min  Normal
 60-69 YRS:    >45 mL/min  Normal 70-79 YRS:    >39 mL/min  Normal 80 and above 
>32 mL/min  Normal 

 

          Neut%     72.5 %    37.0-92.0                     [facility] ([facility]  

 

                                        NORMAL RANGES 

****************************************************************************** 
Age         WBC      RBC        HGB           HCT         MCV        PLT 
------------------------------------------------------------------------------ 
Adult M   4.1-10.9    4.20-6.30   12.0-18.0   37.0-51.0   80-97      140-440  
Adult F   4.1-10.9    4.04-5.48   12.0-18.0   37.0-51.0   80-97      140-440  0
-1 Yr    5.0-20.0    3.9-5.9     15-18       MV: 44      MV: 91     MV: 277  2-9
 Yr.   6.0-17.0    3.8-5.4     11-13       MV: 37      MV: 78     MV: 300  10 
Yrs.   5.0-13.0    3.8-5.4     12-15       MV: 39      MV: 80     MV: 250    
NOTE:  * FOR ADULT BLACK MALES AND FEMALES, NORMAL WBC IS 2.9-7.7 K/ML  * FOR 
ADULT BLACK MALES AND FEMALES, NORMAL RBC,HGB, AND HCT IS 5% LESS  SOURCE FOR 
DATA: [employer] MANUAL( AUTOMATED BLOOD COUNTS AND DIFF.)  APPENDIX
  B-3                      CHRONIC KIDNEY DISEASE STAGING PER NKF:   MALE GFR 
INTERPRETATION:  20-49 YRS:     >60 mL/min  Normal 50-59 YRS:     >56 mL/min  
Normal 60-69 YRS:     >49 mL/min  Normal 70-79 YRS:     >42 mL/min  Normal 80 
and above  >35 mL/min  Normal   FEMALE GRF INTERPRETATION:  20-39 YRS:    >60 
mL/min  Normal 40-49 YRS:    >58 mL/min  Normal 50-59 YRS:    >51 mL/min  Normal
 60-69 YRS:    >45 mL/min  Normal 70-79 YRS:    >39 mL/min  Normal 80 and above 
>32 mL/min  Normal 

 

          MXD#      0.4 10E3/uL 0.0-1.8                       [facility] ([facility]  

 

                                        NORMAL RANGES 

****************************************************************************** 
Age         WBC      RBC        HGB           HCT         MCV        PLT 
------------------------------------------------------------------------------ 
Adult M   4.1-10.9    4.20-6.30   12.0-18.0   37.0-51.0   80-97      140-440  
Adult F   4.1-10.9    4.04-5.48   12.0-18.0   37.0-51.0   80-97      140-440  0
-1 Yr    5.0-20.0    3.9-5.9     15-18       MV: 44      MV: 91     MV: 277  2-9
 Yr.   6.0-17.0    3.8-5.4     11-13       MV: 37      MV: 78     MV: 300  10 
Yrs.   5.0-13.0    3.8-5.4     12-15       MV: 39      MV: 80     MV: 250    
NOTE:  * FOR ADULT BLACK MALES AND FEMALES, NORMAL WBC IS 2.9-7.7 K/ML  * FOR 
ADULT BLACK MALES AND FEMALES, NORMAL RBC,HGB, AND HCT IS 5% LESS  SOURCE FOR 
DATA: [employer] 1800 OPERATION MANUAL( AUTOMATED BLOOD COUNTS AND DIFF.)  APPENDIX
  B-3                      CHRONIC KIDNEY DISEASE STAGING PER NKF:   MALE GFR 
INTERPRETATION:  20-49 YRS:     >60 mL/min  Normal 50-59 YRS:     >56 mL/min  
Normal 60-69 YRS:     >49 mL/min  Normal 70-79 YRS:     >42 mL/min  Normal 80 
and above  >35 mL/min  Normal   FEMALE GRF INTERPRETATION:  20-39 YRS:    >60 
mL/min  Normal 40-49 YRS:    >58 mL/min  Normal 50-59 YRS:    >51 mL/min  Normal
 60-69 YRS:    >45 mL/min  Normal 70-79 YRS:    >39 mL/min  Normal 80 and above 
>32 mL/min  Normal 

 

          Neut#     5.8 %     2.0-7.8                       [facility] ([facility]  

 

                                        NORMAL RANGES 

****************************************************************************** 
Age         WBC      RBC        HGB           HCT         MCV        PLT 
------------------------------------------------------------------------------ 
Adult M   4.1-10.9    4.20-6.30   12.0-18.0   37.0-51.0   80-97      140-440  
Adult F   4.1-10.9    4.04-5.48   12.0-18.0   37.0-51.0   80-97      140-440  0
-1 Yr    5.0-20.0    3.9-5.9     15-18       MV: 44      MV: 91     MV: 277  2-9
 Yr.   6.0-17.0    3.8-5.4     11-13       MV: 37      MV: 78     MV: 300  10 
Yrs.   5.0-13.0    3.8-5.4     12-15       MV: 39      MV: 80     MV: 250    
NOTE:  * FOR ADULT BLACK MALES AND FEMALES, NORMAL WBC IS 2.9-7.7 K/ML  * FOR 
ADULT BLACK MALES AND FEMALES, NORMAL RBC,HGB, AND HCT IS 5% LESS  SOURCE FOR 
DATA: [employer] 1800 OPERATION MANUAL( AUTOMATED BLOOD COUNTS AND DIFF.)  APPENDIX
  B-3                      CHRONIC KIDNEY DISEASE STAGING PER NKF:   MALE GFR 
INTERPRETATION:  20-49 YRS:     >60 mL/min  Normal 50-59 YRS:     >56 mL/min  
Normal 60-69 YRS:     >49 mL/min  Normal 70-79 YRS:     >42 mL/min  Normal 80 
and above  >35 mL/min  Normal   FEMALE GRF INTERPRETATION:  20-39 YRS:    >60 
mL/min  Normal 40-49 YRS:    >58 mL/min  Normal 50-59 YRS:    >51 mL/min  Normal
 60-69 YRS:    >45 mL/min  Normal 70-79 YRS:    >39 mL/min  Normal 80 and above 
>32 mL/min  Normal 

 

          MPV       10.0 fL   9.0-13.0                      [facility] ([facility]  

 

                                        NORMAL RANGES 

****************************************************************************** 
Age         WBC      RBC        HGB           HCT         MCV        PLT 
------------------------------------------------------------------------------ 
Adult M   4.1-10.9    4.20-6.30   12.0-18.0   37.0-51.0   80-97      140-440  
Adult F   4.1-10.9    4.04-5.48   12.0-18.0   37.0-51.0   80-97      140-440  0
-1 Yr    5.0-20.0    3.9-5.9     15-18       MV: 44      MV: 91     MV: 277  2-9
 Yr.   6.0-17.0    3.8-5.4     11-13       MV: 37      MV: 78     MV: 300  10 
Yrs.   5.0-13.0    3.8-5.4     12-15       MV: 39      MV: 80     MV: 250    
NOTE:  * FOR ADULT BLACK MALES AND FEMALES, NORMAL WBC IS 2.9-7.7 K/ML  * FOR 
ADULT BLACK MALES AND FEMALES, NORMAL RBC,HGB, AND HCT IS 5% LESS  SOURCE FOR 
DATA: [employer] 1800 OPERATION MANUAL( AUTOMATED BLOOD COUNTS AND DIFF.)  APPENDIX
  B-3                      CHRONIC KIDNEY DISEASE STAGING PER NKF:   MALE GFR 
INTERPRETATION:  20-49 YRS:     >60 mL/min  Normal 50-59 YRS:     >56 mL/min  
Normal 60-69 YRS:     >49 mL/min  Normal 70-79 YRS:     >42 mL/min  Normal 80 
and above  >35 mL/min  Normal   FEMALE GRF INTERPRETATION:  20-39 YRS:    >60 
mL/min  Normal 40-49 YRS:    >58 mL/min  Normal 50-59 YRS:    >51 mL/min  Normal
 60-69 YRS:    >45 mL/min  Normal 70-79 YRS:    >39 mL/min  Normal 80 and above 
>32 mL/min  Normal 







                                        Procedure

 

                                          



                                                                                
                                                                                
                                                                                
                                                                                
                  



Social History

          



           Code       Duration   Value      Status     Description Data Source(s

)

 

                                2021 12:00:00 AM EDT Patient is a current 

smoker, smokes every day 

completed                 Patient is a current smoker, smokes every day [facility] (

[facility], )

 

           Smoking    2021 12:00:00 AM EDT Current Smoker completed  Curre

nt Smoker eCW1 

([facility])

 

           Smoking    2021 12:00:00 AM EDT Current Smoker completed  Curre

nt Smoker eCW1 

([facility])

 

           Smoking    2021 12:00:00 AM EDT Current Smoker completed  Curre

nt Smoker eCW1 

([facility])



                                                                                
                                                



Vital Signs

          



                    ID                  Date                Data Source

 

                    UNK                                      









           Name       Value      Range      Interpretation Code Description Data

 Source(s)

 

           Diastolic blood pressure 76 mm[Hg]                        76 mm[Hg]  

[facility] ([facility]

 

           Systolic blood pressure 122 mm[Hg]                       122 mm[Hg] M

[facility] ([facility]

 

           Body temperature 97.6 [degF]                       97.6 [degF] [facility]

 ([facility]

 

           Heart rate 85 /min                          85 /min    [facility] ([facility]

 

           Respiratory rate 16 /min                          16 /min    [facility] (

[facility]

 

           Body height 70 [in_i]                        70 [in_i]  [facility] ([facility]

 

                                        5'10" 

 

           Body weight 231.00 [lb_av]                       231.00 [lb_av] [facility]

T ([facility]

 

           Ideal body weight 166 [lb_av]                       166 [lb_av] [facility]

T ([facility]

 

           Body mass index (BMI) [Ratio] 33.1 kg/m2                       33.1 k

g/m2 [facility] ([facility]

 

           Oxygen saturation in Arterial blood by Pulse oximetry 97 %           

                  97 %       [facility] 

([facility]

 

           Body mass index (BMI) [Ratio] 34.6 kg/m2                       34.6 k

g/m2 [facility] ([facility]

 

           Ideal body weight 166 [lb_av]                       166 [lb_av] [facility]

T ([facility]

 

           Respiratory rate 16 /min                          16 /min    [facility] (

[facility]

 

           Systolic blood pressure 102 mm[Hg]                       102 mm[Hg] M

[facility] ([facility]

 

           Diastolic blood pressure 72 mm[Hg]                        72 mm[Hg]  

[facility] ([facility]

 

           Body temperature 97.6 [degF]                       97.6 [degF] [facility]

 ([facility]

 

           Heart rate 53 /min                          53 /min    [facility] ([facility]

 

           Body height 70 [in_i]                        70 [in_i]  [facility] ([facility]

 

                                        5'10" 

 

           Body weight 241.00 [lb_av]                       241.00 [lb_av] [facility]

T ([facility]

 

           Oxygen saturation in Arterial blood by Pulse oximetry 94 %           

                  94 %       [facility] 

([facility]

 

           Body surface area Derived from formula 2.24 m2                       

   2.24 m2    [facility] ([facility], )

 

           Systolic blood pressure 120 mm[Hg]                       120 mm[Hg] M

[facility] ([facility], )

 

           Diastolic blood pressure 78 mm[Hg]                        78 mm[Hg]  

[facility] ([facility])

 

           Heart rate 78 /min                          78 /min    [facility] ([facility])

 

           Oxygen saturation in Arterial blood by Pulse oximetry 98 %           

                  98 %       [facility] 

([facility])

 

                                        Room Air 

 

           Body height 70 [in_i]                        70 [in_i]  [facility] ([facility])

 

                                        5'10" 

 

           Body weight 237.00 [lb_av]                       237.00 [lb_av] [facility]

T ([facility], )

 

           Body mass index (BMI) [Ratio] 34.0 kg/m2                       34.0 k

g/m2 [facility] ([facility])

 

           Ideal body weight 166 [lb_av]                       166 [lb_av] [facility]

T ([facility])

 

           Body weight 107.503 kg                       107.503 kg [facility] ([facility])

 

           Body weight 244 [lb_av]                       244 [lb_av] W1 ([facility])

 

           Body height                                   [in_i]    eCW1 ([facility])

 

           Body mass index (BMI) [Ratio] 35.01 kg/m2                       35.01

 kg/m2 eCW1 ([facility])

 

           Heart rate 79 /min                          79 /min    eCW1 ([facility])

 

           Respiratory rate 18 /min                          18 /min    eCW1 ([facility])

 

           Body temperature 98.0 [degF]                       98.0 [degF] eCW1 (

[facility])

 

           Systolic blood pressure 128 mm[Hg]                       128 mm[Hg] e

CW1 ([facility])

 

           Diastolic blood pressure 76 mm[Hg]                        76 mm[Hg]  

eCW1 ([facility])

 

           Body weight 250 [lb_av]                       250 [lb_av] eCW1 ([facility])

 

           Body height                                   [in_i]    eCW1 ([facility])

 

           Body mass index (BMI) [Ratio] 35.87 kg/m2                       35.87

 kg/m2 eCW1 ([facility])

 

           Heart rate 70 /min                          70 /min    eCW1 ([facility])

 

           Respiratory rate 18 /min                          18 /min    eCW1 ([facility])

 

           Body temperature 97.7 [degF]                       97.7 [degF] eCW1 (

[facility])

 

           Systolic blood pressure 120 mm[Hg]                       120 mm[Hg] e

CW1 ([facility])

 

           Diastolic blood pressure 74 mm[Hg]                        74 mm[Hg]  

eCW1 ([facility])

 

           Systolic blood pressure 112 mm[Hg]                       112 mm[Hg] M

[facility] ([facility]

 

           Diastolic blood pressure 72 mm[Hg]                        72 mm[Hg]  

[facility] ([facility]

 

           Heart rate 79 /min                          79 /min    [facility] ([facility]

 

           Ideal body weight 166 [lb_av]                       166 [lb_av] [facility]

T ([facility]

 

           Oxygen saturation in Arterial blood by Pulse oximetry 96 %           

                  96 %       [facility] 

([facility]

 

           Body temperature 97.3 [degF]                       97.3 [degF] [facility]

 ([facility]

 

           Respiratory rate 16 /min                          16 /min    [facility] (

[facility]

 

           Body height 70 [in_i]                        70 [in_i]  [facility] ([facility]

 

                                        5'10" 

 

           Body weight 252.00 [lb_av]                       252.00 [lb_av] [facility]

T ([facility]

 

           Body mass index (BMI) [Ratio] 36.2 kg/m2                       36.2 k

g/m2 [facility] ([facility]

 

           Body weight 251.50 [lb_av]                       251.50 [lb_av] [facility]

T ([facility])

 

           Body mass index (BMI) [Ratio] 36.1 kg/m2                       36.1 k

g/m2 [facility] ([facility])

 

           Ideal body weight 166 [lb_av]                       166 [lb_av] [facility]

T ([facility])

 

           Body weight 114.080 kg                       114.080 kg [facility] ([facility])

 

           Body surface area Derived from formula 2.30 m2                       

   2.30 m2    [facility] ([facility])

 

           Body height 70 [in_i]                        70 [in_i]  [facility] ([facility])

 

                                        5'10" 

 

           Systolic blood pressure 118 mm[Hg]                       118 mm[Hg] 

[facility] ([facility])

 

           Diastolic blood pressure 67 mm[Hg]                        67 mm[Hg]  

[facility] ([facility])

 

           Heart rate 77 /min                          77 /min    [facility] ([facility])

 

           Ideal body weight 166 [lb_av]                       166 [lb_av] [facility]

T ([facility]

 

           Body mass index (BMI) [Ratio] 34.9 kg/m2                       34.9 k

g/m2 [facility]

 

           Oxygen saturation in Arterial blood by Pulse oximetry 96 %           

                  96 %       [facility] 

([facility]

 

           Systolic blood pressure 128 mm[Hg]                       128 mm[Hg] M

ED[facility] ([facility]

 

           Diastolic blood pressure 72 mm[Hg]                        72 mm[Hg]  

[facility] ([facility]

 

           Body temperature 98.2 [degF]                       98.2 [degF] [facility]

 ([facility]

 

           Heart rate 71 /min                          71 /min    [facility] ([facility]

 

           Respiratory rate 16 /min                          16 /min    [facility] (

[facility]

 

           Body height 70 [in_i]                        70 [in_i]  [facility] ([facility]

 

                                        5'10" 

 

           Body weight 243.00 [lb_av]                       243.00 [lb_av] [facility]

T ([facility]

 

           Systolic blood pressure 108 mm[Hg]                       108 mm[Hg] M

EDENT ([facility], )

 

           Diastolic blood pressure 70 mm[Hg]                        70 mm[Hg]  

[facility] ([facility])

 

           Heart rate 86 /min                          86 /min    [facility] ([facility])

 

           Oxygen saturation in Arterial blood by Pulse oximetry 96 %           

                  96 %       [facility] 

([facility])

 

                                        Room Air 

 

           Body height 70 [in_i]                        70 [in_i]  [facility] ([facility])

 

                                        5'10" 

 

           Body weight 242.00 [lb_av]                       242.00 [lb_av] [facility]

T ([facility])

 

           Body mass index (BMI) [Ratio] 34.7 kg/m2                       34.7 k

g/m2 [facility] ([facility])

 

           Ideal body weight 166 [lb_av]                       166 [lb_av] [facility]

T ([facility])

 

           Body weight 109.771 kg                       109.771 kg [facility] ([facility])

 

           Body surface area Derived from formula 2.26 m2                       

   2.26 m2    [facility] ([facility])

 

           Systolic blood pressure 115 mm[Hg]                       115 mm[Hg] M

[facility] ([facility])

 

           Diastolic blood pressure 75 mm[Hg]                        75 mm[Hg]  

[facility] ([facility])

 

           Body height 70 [in_i]                        70 [in_i]  [facility] ([facility])

 

                                        5'10" 

 

           Body weight 242.00 [lb_av]                       242.00 [lb_av] [facility]

T ([facility])

 

           Body mass index (BMI) [Ratio] 34.7 kg/m2                       34.7 k

g/m2 [facility] ([facility])

 

           Ideal body weight 166 [lb_av]                       166 [lb_av] [facility]

T ([facility])

 

           Body weight 109.771 kg                       109.771 kg [facility] ([facility])

 

           Body surface area Derived from formula 2.26 m2                       

   2.26 m2    [facility] ([facility])

 

           Systolic blood pressure 130 mm[Hg]                       130 mm[Hg] M

[facility] ([facility])

 

           Diastolic blood pressure 78 mm[Hg]                        78 mm[Hg]  

[facility] ([facility])

 

           Body height 70 [in_i]                        70 [in_i]  [facility] ([facility])

 

                                        5'10" 

 

           Body weight 245.00 [lb_av]                       245.00 [lb_av] [facility]

T ([facility])

 

           Body mass index (BMI) [Ratio] 35.1 kg/m2                       35.1 k

g/m2 [facility] ([facility])

 

           Ideal body weight 166 [lb_av]                       166 [lb_av] [facility]

T ([facility])

 

           Body weight 111.132 kg                       111.132 kg [facility] ([facility])

 

           Ideal body weight 166 [lb_av]                       166 [lb_av] [facility]

T ([facility]

 

           Body mass index (BMI) [Ratio] 34.9 kg/m2                       34.9 k

g/m2 [facility] ([facility]

 

           Oxygen saturation in Arterial blood by Pulse oximetry 96 %           

                  96 %       [facility] 

([facility]

 

           Systolic blood pressure 102 mm[Hg]                       102 mm[Hg] M

[facility] ([facility]

 

           Diastolic blood pressure 54 mm[Hg]                        54 mm[Hg]  

[facility] ([facility]

 

           Body temperature 97.6 [degF]                       97.6 [degF] [facility]

 ([facility]

 

           Heart rate 77 /min                          77 /min    [facility] ([facility]

 

           Res[facility] 16 /min                          16 /min    [facility]

 

           Body height 70 [in_i]                        70 [in_i]  [facility] ([facility]

 

                                        5'10" 

 

           Body weight 243.00 [lb_av]                       243.00 [lb_av] JUDE TAPIA ([facility]



                                                                                
                  



Patient Treatment Plan of Care

          



             Planned Activity Planned Date Details      Description  Data Source

(s)

 

             vardenafil 20 MG Oral Tablet 2021 12:00:00 AM EDT            

               eCW1 ([facility])

 

             vardenafil 20 MG Oral Tablet 2021 12:00:00 AM EDT            

               eCW1 ([facility])

 

             vardenafil 20 MG Oral Tablet 2021 12:00:00 AM EDT            

               eCW1 ([facility])

 

             vardenafil 20 MG Oral Tablet 2021 12:00:00 AM EDT            

               eCW1 ([facility])

## 2021-11-03 NOTE — CCD
Continuity of Care Document (CCD)

                             Created on: 10/06/2021



Robbin Singleton

External Reference #: [id number]

: 1950

Sex: Male



Demographics





                          Address                   [address] [place]  [address]

 

                          Home Phone                [phone]

 

                          Preferred Language        Unknown

 

                          Marital Status            

 

                          Mandaen Affiliation     Unknown

 

                          Race                      White

 

                          Ethnic Group              Not  or 





Author





                          Author                    Robbin LUA [facility]

 

                          Organization              Unknown

 

                          Address                   [address]

[place]  [address]



 

                          Phone                     [phone]







Problems





                    Active Problems     Provider            Date

 

                    Chronic obstructive lung disease Abigail Israel    Onset: 

2002

 

                    Sleep apnea         Pancho Ge D.O., TABITHA Onset: 12/3



 

                    Gastroesophageal reflux disease Pancho Ge D.O., DEVENFP

 Onset: 2002

 

                    Hyperlipidemia      Pancho Ge D.O., FAAFP Onset: 

 

                    Degenerative joint disease involving multiple joints WILLIE Geronimo DO Onset:

2004

 

                    Impotence of organic origin Pancho Ge D.O., FAAFP Ons

et: 2003

 

                    Anxiety state       Simon Moncada M.D. Onset: 2007

 

                    Moderate recurrent major depression Simon Moncada M.D. On

set: 2007

 

                    Hypothyroidism      Simon Moncada M.D. Onset: 2007

 

                    Benign prostatic hyperplasia without outflow obstruction Malachi Moncada M.D. 

Onset: 2007

 

                    Posttraumatic stress disorder Irvin Lua RPA Onset: 

2009

 

                    Type 2 diabetes mellitus Irvin Lua RPA Onset: 

 

                    Vitamin D deficiency Irvin Lua RPA Onset: 

0

 

                    Secondary parkinsonism Irvin Lua RPA Onset: 

013

 

                                        Note: [facility] Treated 

 

                    Kidney stone        Irvin Lua RPA Onset: 2014

 

                                        Note: 14 CT 

 

                    Lumbar radiculopathy Irvin Lua RPA Onset: 10/29/201

5

 

                    Chronic kidney disease stage 4 Irvin Lua, RPA Onset:

 2016

 

                    History of polyp of colon Irvin Lua, RPA Onset: 

 

                                        Note: Colonoscopy 16 Dr. Boby paez 1 yr 







Social History





                Type            Date            Description     Comments

 

                Birth Sex                       Unknown          

 

                Tobacco Use     Start: Unknown  Half a pack a day  

 

                ETOH Use                        Denies alcohol use  

 

                Recreational Drug Use                 Never Used Drugs  

 

                Tobacco Use     Start: Unknown End: Unknown Patient is a former 

smoker  

 

                Exercise Type/Frequency                 exercises sporadically  







Allergies and adverse reactions





             Active Allergies Criticality  Reaction | Severity Comments     Date

 

             Penicillin   Unable to assess criticality                          

 2001

 

             Niaspan      Unable to assess criticality                          

 10/11/2010

 

             Metformin    Unable to assess criticality              Hives       

 2011







Medications





           Active Medications SIG        Qnty       Indications Ordering Provide

r Date

 

                                        Moderna Covid-19 Vaccine                

     100mcg/0.5ML Suspension            

             - 21                           Pancho Ge D.O., FAAFP

 2021

 

                                        Flonase Allergy Relief                  

   50mcg/Act Suspension                 

             2 sprays each nare every day 15.800ml                  Pancho brody D.O., FAAFP 2021

 

                          Viagra                     100mg Tablets              

     1 by mouth every day 

as needed       14tabs                          Pancho Ge D.O., FAAFP 

 

                          Proair HFA                     108(90Base) mcg/Act Aer

osol                   

inhale 2 puffs by mouth every 4 hours as needed for shortness of breath 8.5units

                                        Pancho Ge D.O., FAAFP 2020

 

                          Trulicity                     1.5mg/0.5ML Solution Pen

-Inject                   

inject 1 subcutaneously once a week 2units                          Arnulfo Carreno M.D. 2019

 

                    Prevnar 13                      Suspension                  

 injection x 1       

.500ml                                  Pancho Ge D.O., FAAFP 2018

 

                          Pen Needles                     32G X 4 mm Misc       

            use to inject 

insulin twice a day dx: e 11.9 200units                        Pancho Ge D.O., FAAFP 

2018

 

                          Symbicort                     160-4.5mcg/Act Aerosol  

                 2 puffs 

bid.                                            Unknown         

 

                                        Levemir Flextouch                     10

0Unit/ML Solution Pen-Inject            

                inject 40 units twice a day (change 3/26/20) 45ml               

             Pancho Ge D.O.,

FAAFP                                   







Medications Administered in Office





           Medication SIG        Qnty       Indications Ordering Provider Date

 

             Injection (SC)/(Im)                      Injection                 

                                         

Sofía Nuno D., FNP-C             2014

 

             Injection (SC)/(Im)                      Injection                 

                                         

Irvin Lua, ALINE                  10/17/2012

 

             Injection (SC)/(Im)                      Injection                 

                                         Simon Moncada M.D.                         2002







Immunizations





           CPT Code   Status     Date       Vaccine    Reaction   Lot #

 

                [id number]           Given           10/05/2021      Influenza Virus 

Vaccine, Quadrivalent, Slit Virus, Im Use

3Y & Up                                             [id number]

 

                [id number]           Given           10/21/2015      Influenza Vaccin

e (Fluzone) 3Yrs Of Age Or Older Medicare

Plans                                                

 

                59423           Given           10/20/2014      Influenza Vaccin

e (Fluzone) 3Yrs Of Age Or Older Medicare

Plans                                               [id number]

 

           34189      Given      10/20/2014 Pneumococcal Immunization           

 [id number]

 

                26056           Given           10/17/2012      Influenza Vaccin

e (Fluzone) 3Yrs Of Age Or Older Medicare

Plans                                                

 

                96131           Given           10/17/2012      Tdap Tetanus,Dip

htheria Toxoids/Acellular Pertussis 7Yrs 

Or Older                                            [id number]

 

                44383           Given           10/17/2012      Influenza Virus 

Vac. Split Virus Individuals 3 Years And 

Above                                               [id number]

 

                70893           Given           10/15/2011      Influenza Virus 

Vac. Split Virus Individuals 3 Years And 

Above                                                

 

                08234           Given           2009      Influenza Virus 

Vac. Split Virus Individuals 3 Years And 

Above                                               [id number]

 

           71102      Given      10/10/2008 Pneumococcal Immunization           

 [id number]

 

                05343           Given           10/10/2008      Influenza Virus 

Vac. Split Virus Individuals 3 Years And 

Above                                               [id number]

 

           37978      Given      2002 Influenza Virus Vac. Whole Virus    

         

 

                54071           Given           2002      Influenza Virus 

Vac. Split Virus Individuals 3 Years And 

Above                                                

 

                                          

 

                67260           Refused         2020      Influenza Virus 

Vaccine, Quadrivalent, Slit Virus, Im 

Use 3Y & Up               RECEIVED AT [facility] 1 WEEK AGO  







Vital Signs





                Date            Vital           Result          Comment

 

                10/05/2021  9:08am BP Systolic     122 mmHg         

 

                    BP Diastolic        76 mmHg              

 

                    Body Temperature    97.6 F             

 

                    Heart Rate          85 /min              

 

                    Respiratory Rate    16 /min              

 

                    Height              70 inches           5'10"

 

                    Weight              231.00 lb            

 

                    Ideal Body Weight   166 lb               

 

                    BMI (Body Mass Index) 33.1 kg/m2           

 

                    O2 % BldC Oximetry  97 %                 

 

                2021  1:08pm BP Systolic     102 mmHg         

 

                    BP Diastolic        72 mmHg              

 

                    Body Temperature    97.6 F             

 

                    Heart Rate          53 /min              

 

                    Respiratory Rate    16 /min              

 

                    Height              70 inches           5'10"

 

                    Weight              241.00 lb            

 

                    Ideal Body Weight   166 lb               

 

                    BMI (Body Mass Index) 34.6 kg/m2           

 

                    O2 % BldC Oximetry  94 %                 







Results





        Test    Acquired Date Facility Test    Result  H/L     Range   Note

 

                    [facility] test finding [facility]/05/2021          [facility]

             Hemoglobin A1c 5.9 %                     4.50-6.20     

 

                    CMP                 10/05/2021          [facility]

             Glu          102 mg/dL                 70 - 110     1

 

             BUN          22 mg/dL                  8 - 23        

 

             Creat        1.1 mg/dL                 0.7 - 1.2     

 

             BUN/Creatinine Ratio 19.2 CALC                               

 

             Na           140 mmol/L                136 - 145     

 

             K            4.2 mmol/L                3.5 - 5.1     

 

             CL           105.3 mmol/L              98.0 - 107.0  

 

             Co2          19.6 mmol/L  Low          22.0 - 29.0   

 

             CA           9.4 mg/dL                 8.6 - 10.2    

 

             TP           6.5 g/dL     Low          6.6 - 8.7     

 

             Alb          4.6 g/dL                  3.5 - 5.2     

 

             A/G Ratio    2.3 CALC                                

 

             Globulin     2.0 CALC                                

 

             Alp          87.7 U/L                  40 - 129      

 

             Alt (SGPT)   9 U/L                     0 - 41        

 

             Ast (Sgot)   12 U/L                    0 - 40        

 

             Tbili        0.41 mg/dL                0.0 - 1.2     

 

             Osmolality-Calculated 282.3 CALC                              

 

             Anion Gap    19 mmol/L                               

 

             eGFR  78 #                      Calc         2

 

             eGFR Non-Afr. American 67 #                      Calc         3

 

                    Lipid Panel         10/05/2021          [facility]

             Chol         208 mg/dL    High         0 - 200       

 

             Trig         114 mg/dL                 35 - 200      

 

             HDL          40 mg/dL                  35 - 55       

 

             LDL_C        145 Calc     High         75 - 129      

 

             Cho/HDL Ratio 5.3 CALC                                

 

                    Laboratory test finding 2021          [facility]

             Hemoglobin A1c 6.3 %        High         4.50-6.20     







                          1                         CHRONIC KIDNEY DISEASE STAGI

NG PER NKF:   MALE GFR INTERPRETATION:  20-49 YRS:

    >60 mL/min  Normal 50-59 YRS:     >56 mL/min  Normal 60-69 YRS:     >49 
mL/min  Normal 70-79 YRS:     >42 mL/min  Normal 80 and above  >35 mL/min  
Normal   FEMALE GRF INTERPRETATION:  20-39 YRS:    >60 mL/min  Normal 40-49 YRS:
   >58 mL/min  Normal 50-59 YRS:    >51 mL/min  Normal 60-69 YRS:    >45 mL/min 
Normal 70-79 YRS:    >39 mL/min  Normal 80 and above >32 mL/min  
NormalCLASSIFICATION            CHOLESTEROL FOR ADULTS       
CHILDREN/ADOLESCENTS*   DESIRABLE:                <200 MG/DL                   
<170 MG/DL  BORDER-LINE HIGH RISK:    200-239 MG/DL                170-199 MG/DL
 HIGH RISK:                >240 MG/DL                   >200 MG/DL    CLASS. FOR
PRIMARY LDL CHOL PREVENTION:        LDL CHOL-CHILD/ADOLESCENTS*   DESIRABLE:    
         <130 MG/DL              <110 MG/DL  BORDERLINE-HIGH RISK:   130-159 
MG/DL           110-129 MG/DL  HIGH RISK:              >160 MG/DL              
>130 MG/DL   *CHILDREN AND ADOLESCENTS REPRESENTS INDIVIDUALA AGED 2-19 YEARS 
EXCLUSIVE.

 

                          2                         CKD-EPI



 

                          3                         CKD-EPI









Procedures





                Date            Code            Description     Status

 

                10/05/2021      28066           Office/Outpatient Established Mo

d MDM 30-39 Min Completed

 

                2021      05183           Office/Outpatient Established Mo

d MDM 30-39 Min Completed

 

                2021      81317           Capillary Blood Collection Finge

r, Heel, Ear Stick Completed







Medical Devices





                                        Description

 

                                        No Information Available







Encounters





           Type       Date       Location   Provider   Dx         Diagnosis

 

           Office Visit 10/05/2021  9:15a [facility] Irvin Lua, RP

A E11.9      

Type 2 diabetes mellitus without complications

 

                          J44.9                     Chronic obstructive pulmonar

y disease, unspecified

 

                          E78.5                     Hyperlipidemia, unspecified

 

                          G47.30                    Sleep apnea, unspecified

 

                          E03.9                     Hypothyroidism, unspecified

 

           Office Visit 2021  1:00p [facility] Irvin Lua, RP

A E11.9      

Type 2 diabetes mellitus without complications

 

                          J44.9                     Chronic obstructive pulmonar

y disease, unspecified

 

                          E78.5                     Hyperlipidemia, unspecified

 

                          G47.30                    Sleep apnea, unspecified

 

                          E03.9                     Hypothyroidism, unspecified







Assessments





                Date            Code            Description     Provider

 

                10/05/2021      E11.9           Type 2 diabetes mellitus without

 complications Irvin Lua, RPA

 

                10/05/2021      J44.9           Chronic obstructive pulmonary di

sease, unspecified Irvin Lua, RPA

 

                10/05/2021      E78.5           Hyperlipidemia, unspecified Irvin Simpson, RPA

 

                10/05/2021      G47.30          Sleep apnea, unspecified Irvin Lua, RPA

 

                10/05/2021      E03.9           Hypothyroidism, unspecified Irvin Simpson, RPA

 

                2021      E11.9           Type 2 diabetes mellitus without

 complications Irvin Lua, RPA

 

                2021      J44.9           Chronic obstructive pulmonary di

sease, unspecified Irvin Lua, RPA

 

                2021      E78.5           Hyperlipidemia, unspecified Irvin Simpson, RPA

 

                2021      G47.30          Sleep apnea, unspecified Irvin Lua, RPA

 

                2021      E03.9           Hypothyroidism, unspecified Irvin Simpson, RPA

 

                2021      E11.9           Type 2 diabetes mellitus without

 complications [facility] Schedule







Plan of Treatment

Future Appointment(s):* 2022  9:00 am - Irvin Lua, [facility] at 
  [facility]

2009 - Simon Moncada M.D.* 496 COPD* Comments:* Discussed current use 
  of MDIs/Nebulyzed rx.  Ensured appropriate technique.



* Follow up:* In 3 months, For Complete Annual Exam.





* 780.57 Sleep Apnea Other Unspecified

* 530.81 Esophageal Reflux

* 244.9 Hypothyroidism Unspecified

* 272.4 Hyperlipidemia Oth Unspec

* 715.09 Osteoarth General Multiple Sites* Follow up:* In 3 months, For Complete
  Annual Exam.





* V04.81 Flu Vaccine

* All * New Medication:* Xopenex HFA 45 mcg/Act - 2 puffs q4-6h prn shortness of
  breath









Functional Status





                                        Description

 

                                        No Information Available







Mental Status





                                        Description

 

                                        No Information Available







Referrals





                                        Description

 

                                        No Information Available

## 2021-11-03 NOTE — CCD
Continuity of Care Document (CCD)

                             Created on: 10/07/2021



Robbin Singleton

External Reference #: [id number]

: 1950

Sex: Male



Demographics





                          Address                   [address] [place]  [address]

 

                          Home Phone                [phone]

 

                          Preferred Language        Unknown

 

                          Marital Status            

 

                          Episcopal Affiliation     Unknown

 

                          Race                      White

 

                          Ethnic Group              Not  or 





Author





                          Author                    Robbin LUA [facility]

 

                          Organization              Unknown

 

                          Address                   [address]

[place]  [address]



 

                          Phone                     [phone]







Problems





                    Active Problems     Provider            Date

 

                    Chronic obstructive lung disease Abigail Israel    Onset: 

2002

 

                    Sleep apnea         Pancho Ge D.O., TABITHA Onset: 12/3



 

                    Gastroesophageal reflux disease Pancho Ge D.O., DEVENFP

 Onset: 2002

 

                    Hyperlipidemia      Pancho Ge D.O., FAAFP Onset: 

 

                    Degenerative joint disease involving multiple joints WILLIE Geronimo DO Onset:

2004

 

                    Impotence of organic origin Pancho Ge D.O., FAAFP Ons

et: 2003

 

                    Anxiety state       Simon Moncada M.D. Onset: 2007

 

                    Moderate recurrent major depression Simon Moncada M.D. On

set: 2007

 

                    Hypothyroidism      Simon Moncada M.D. Onset: 2007

 

                    Benign prostatic hyperplasia without outflow obstruction Malachi Moncada M.D. 

Onset: 2007

 

                    Posttraumatic stress disorder Irvin Lua RPA Onset: 

2009

 

                    Type 2 diabetes mellitus Irvin Lua RPA Onset: 

 

                    Vitamin D deficiency Irvin Lua RPA Onset: 

0

 

                    Secondary parkinsonism Irvin Lua RPA Onset: 

013

 

                                        Note: [facility] Treated 

 

                    Kidney stone        Irvin Lua RPA Onset: 2014

 

                                        Note: 14 CT 

 

                    Lumbar radiculopathy Irvin Lua RPA Onset: 10/29/201

5

 

                    Chronic kidney disease stage 4 Irvin Lua, RPA Onset:

 2016

 

                    History of polyp of colon Irvin Lua, RPA Onset: 

 

                                        Note: Colonoscopy 16 Dr. Boby paez 1 yr 







Social History





                Type            Date            Description     Comments

 

                Birth Sex                       Unknown          

 

                Tobacco Use     Start: Unknown  Half a pack a day  

 

                ETOH Use                        Denies alcohol use  

 

                Recreational Drug Use                 Never Used Drugs  

 

                Tobacco Use     Start: Unknown End: Unknown Patient is a former 

smoker  

 

                Exercise Type/Frequency                 exercises sporadically  







Allergies and adverse reactions





             Active Allergies Criticality  Reaction | Severity Comments     Date

 

             Penicillin   Unable to assess criticality                          

 2001

 

             Niaspan      Unable to assess criticality                          

 10/11/2010

 

             Metformin    Unable to assess criticality              Hives       

 2011







Medications





           Active Medications SIG        Qnty       Indications Ordering Provide

r Date

 

                                        Moderna Covid-19 Vaccine                

     100mcg/0.5ML Suspension            

             - 21                           Pancho Ge D.O., FAAFP

 2021

 

                                        Flonase Allergy Relief                  

   50mcg/Act Suspension                 

             2 sprays each nare every day 15.800ml                  Pancho brody D.O., FAAFP 2021

 

                          Viagra                     100mg Tablets              

     1 by mouth every day 

as needed       14tabs                          Pancho Ge D.O., FAAFP 

 

                          Proair HFA                     108(90Base) mcg/Act Aer

osol                   

inhale 2 puffs by mouth every 4 hours as needed for shortness of breath 8.5units

                                        Pancho Ge D.O., FAAFP 2020

 

                          Trulicity                     1.5mg/0.5ML Solution Pen

-Inject                   

inject 1 subcutaneously once a week 2units                          Arnulfo Carreno M.D. 2019

 

                    Prevnar 13                      Suspension                  

 injection x 1       

.500ml                                  Pancho Ge D.O., FAAFP 2018

 

                          Pen Needles                     32G X 4 mm Misc       

            use to inject 

insulin twice a day dx: e 11.9 200units                        Pancho Ge D.O., FAAFP 

2018

 

                          Symbicort                     160-4.5mcg/Act Aerosol  

                 2 puffs 

bid.                                            Unknown         

 

                                        Levemir Flextouch                     10

0Unit/ML Solution Pen-Inject            

                inject 40 units twice a day (change 3/26/20) 45ml               

             Pancho Ge D.O.,

FAAFP                                   







Medications Administered in Office





           Medication SIG        Qnty       Indications Ordering Provider Date

 

             Injection (SC)/(Im)                      Injection                 

                                         

Sofía Nuno D., FNP-C             2014

 

             Injection (SC)/(Im)                      Injection                 

                                         

Irvin Lua, ALINE                  10/17/2012

 

             Injection (SC)/(Im)                      Injection                 

                                         Simon Moncada M.D.                         2002







Immunizations





           CPT Code   Status     Date       Vaccine    Reaction   Lot #

 

                [id number]           Given           10/05/2021      Influenza Virus 

Vaccine, Quadrivalent, Slit Virus, Im Use

3Y & Up                                             [id number]

 

                [id number]           Given           10/21/2015      Influenza Vaccin

e (Fluzone) 3Yrs Of Age Or Older Medicare

Plans                                                

 

                90414           Given           10/20/2014      Influenza Vaccin

e (Fluzone) 3Yrs Of Age Or Older Medicare

Plans                                               [id number]

 

           15190      Given      10/20/2014 Pneumococcal Immunization           

 [id number]

 

                27180           Given           10/17/2012      Influenza Vaccin

e (Fluzone) 3Yrs Of Age Or Older Medicare

Plans                                                

 

                69126           Given           10/17/2012      Tdap Tetanus,Dip

htheria Toxoids/Acellular Pertussis 7Yrs 

Or Older                                            [id number]

 

                82320           Given           10/17/2012      Influenza Virus 

Vac. Split Virus Individuals 3 Years And 

Above                                               [id number]

 

                52656           Given           10/15/2011      Influenza Virus 

Vac. Split Virus Individuals 3 Years And 

Above                                                

 

                03766           Given           2009      Influenza Virus 

Vac. Split Virus Individuals 3 Years And 

Above                                               [id number]

 

           70384      Given      10/10/2008 Pneumococcal Immunization           

 [id number]

 

                82322           Given           10/10/2008      Influenza Virus 

Vac. Split Virus Individuals 3 Years And 

Above                                               [id number]

 

           51707      Given      2002 Influenza Virus Vac. Whole Virus    

         

 

                64147           Given           2002      Influenza Virus 

Vac. Split Virus Individuals 3 Years And 

Above                                                

 

                                          

 

                09291           Refused         2020      Influenza Virus 

Vaccine, Quadrivalent, Slit Virus, Im 

Use 3Y & Up               RECEIVED AT [facility] 1 WEEK AGO  







Vital Signs





                Date            Vital           Result          Comment

 

                10/05/2021  9:08am BP Systolic     122 mmHg         

 

                    BP Diastolic        76 mmHg              

 

                    Body Temperature    97.6 F             

 

                    Heart Rate          85 /min              

 

                    Respiratory Rate    16 /min              

 

                    Height              70 inches           5'10"

 

                    Weight              231.00 lb            

 

                    Ideal Body Weight   166 lb               

 

                    BMI (Body Mass Index) 33.1 kg/m2           

 

                    O2 % BldC Oximetry  97 %                 

 

                2021  1:08pm BP Systolic     102 mmHg         

 

                    BP Diastolic        72 mmHg              

 

                    Body Temperature    97.6 F             

 

                    Heart Rate          53 /min              

 

                    Respiratory Rate    16 /min              

 

                    Height              70 inches           5'10"

 

                    Weight              241.00 lb            

 

                    Ideal Body Weight   166 lb               

 

                    BMI (Body Mass Index) 34.6 kg/m2           

 

                    O2 % BldC Oximetry  94 %                 







Results





        Test    Acquired Date Facility Test    Result  H/L     Range   Note

 

                    [facility] test finding [facility]05/2021          [facility]

             Hemoglobin A1c 5.9 %                     4.50-6.20     

 

                    CMP                 10/05/2021          [facility]

             Glu          102 mg/dL                 70 - 110     1

 

             BUN          22 mg/dL                  8 - 23        

 

             Creat        1.1 mg/dL                 0.7 - 1.2     

 

             BUN/Creatinine Ratio 19.2 CALC                               

 

             Na           140 mmol/L                136 - 145     

 

             K            4.2 mmol/L                3.5 - 5.1     

 

             CL           105.3 mmol/L              98.0 - 107.0  

 

             Co2          19.6 mmol/L  Low          22.0 - 29.0   

 

             CA           9.4 mg/dL                 8.6 - 10.2    

 

             TP           6.5 g/dL     Low          6.6 - 8.7     

 

             Alb          4.6 g/dL                  3.5 - 5.2     

 

             A/G Ratio    2.3 CALC                                

 

             Globulin     2.0 CALC                                

 

             Alp          87.7 U/L                  40 - 129      

 

             Alt (SGPT)   9 U/L                     0 - 41        

 

             Ast (Sgot)   12 U/L                    0 - 40        

 

             Tbili        0.41 mg/dL                0.0 - 1.2     

 

             Osmolality-Calculated 282.3 CALC                              

 

             Anion Gap    19 mmol/L                               

 

             eGFR  78 #                      Calc         2

 

             eGFR Non-Afr. American 67 #                      Calc         3

 

                    Lipid Panel         10/05/2021          [facility]

             Chol         208 mg/dL    High         0 - 200       

 

             Trig         114 mg/dL                 35 - 200      

 

             HDL          40 mg/dL                  35 - 55       

 

             LDL_C        145 Calc     High         75 - 129      

 

             Cho/HDL Ratio 5.3 CALC                                

 

                    Laboratory test finding 2021          [facility]

             Hemoglobin A1c 6.3 %        High         4.50-6.20     







                          1                         CHRONIC KIDNEY DISEASE STAGI

NG PER NKF:   MALE GFR INTERPRETATION:  20-49 YRS:

    >60 mL/min  Normal 50-59 YRS:     >56 mL/min  Normal 60-69 YRS:     >49 
mL/min  Normal 70-79 YRS:     >42 mL/min  Normal 80 and above  >35 mL/min  
Normal   FEMALE GRF INTERPRETATION:  20-39 YRS:    >60 mL/min  Normal 40-49 YRS:
   >58 mL/min  Normal 50-59 YRS:    >51 mL/min  Normal 60-69 YRS:    >45 mL/min 
Normal 70-79 YRS:    >39 mL/min  Normal 80 and above >32 mL/min  
NormalCLASSIFICATION            CHOLESTEROL FOR ADULTS       
CHILDREN/ADOLESCENTS*   DESIRABLE:                <200 MG/DL                   
<170 MG/DL  BORDER-LINE HIGH RISK:    200-239 MG/DL                170-199 MG/DL
 HIGH RISK:                >240 MG/DL                   >200 MG/DL    CLASS. FOR
PRIMARY LDL CHOL PREVENTION:        LDL CHOL-CHILD/ADOLESCENTS*   DESIRABLE:    
         <130 MG/DL              <110 MG/DL  BORDERLINE-HIGH RISK:   130-159 
MG/DL           110-129 MG/DL  HIGH RISK:              >160 MG/DL              
>130 MG/DL   *CHILDREN AND ADOLESCENTS REPRESENTS INDIVIDUALA AGED 2-19 YEARS 
EXCLUSIVE.

 

                          2                         CKD-EPI



 

                          3                         CKD-EPI









Procedures





                Date            Code            Description     Status

 

                10/05/2021      37780           Office/Outpatient Established Mo

d MDM 30-39 Min Completed

 

                2021      27827           Office/Outpatient Established Mo

d MDM 30-39 Min Completed

 

                2021      33960           Capillary Blood Collection Finge

r, Heel, Ear Stick Completed







[employer]





                                        Description

 

                                        No Information Available







Encounters





           Type       Date       Location   Provider   Dx         Diagnosis

 

           Office Visit 10/05/2021  9:15a [facility] Irvin Lua, FRED

A E11.9      

Type 2 diabetes mellitus without complications

 

                          J44.9                     Chronic obstructive pulmonar

y disease, unspecified

 

                          E78.5                     Hyperlipidemia, unspecified

 

                          G47.30                    Sleep apnea, unspecified

 

                          E03.9                     Hypothyroidism, unspecified

 

                          Z23                       Encounter for immunization

 

           Office Visit 2021  1:00p [facility] Irvin Lua, FRED

A E11.9      

Type 2 diabetes mellitus without complications

 

                          J44.9                     Chronic obstructive pulmonar

y disease, unspecified

 

                          E78.5                     Hyperlipidemia, unspecified

 

                          G47.30                    Sleep apnea, unspecified

 

                          E03.9                     Hypothyroidism, unspecified







Assessments





                Date            Code            Description     Provider

 

                10/05/2021      E11.9           Type 2 diabetes mellitus without

 complications Irvin Lua, RPA

 

                10/05/2021      J44.9           Chronic obstructive pulmonary di

sease, unspecified Irvin Lua, RPA

 

                10/05/2021      E78.5           Hyperlipidemia, unspecified Irvin Simpson, RPA

 

                10/05/2021      G47.30          Sleep apnea, unspecified Irvin Lua, RPA

 

                10/05/2021      E03.9           Hypothyroidism, unspecified Irvin Simpson, RPA

 

                10/05/2021      Z23             Encounter for immunization Irvin Gibbs, RPA

 

                2021      E11.9           Type 2 diabetes mellitus without

 complications Irvin Lua, RPA

 

                2021      J44.9           Chronic obstructive pulmonary di

sease, unspecified Irvin Lua, RPA

 

                2021      E78.5           Hyperlipidemia, unspecified Irvin Simpson, RPA

 

                2021      G47.30          Sleep apnea, unspecified Irvin Lua, RPA

 

                2021      E03.9           Hypothyroidism, unspecified Irvin Simpson, RPA

 

                2021      E11.9           Type 2 diabetes mellitus without

 complications Laboratory 

[facility] Schedule







Plan of Treatment

Future Appointment(s):* 2022  9:00 am - Irvin Lua, [facility] at 
  [facility]

2009 - Simon Moncada M.D.* 496 COPD* Comments:* Discussed current use 
  of MDIs/Nebulyzed rx.  Ensured appropriate technique.



* Follow up:* In 3 months, For Complete Annual Exam.





* 780.57 Sleep Apnea Other Unspecified

* 530.81 Esophageal Reflux

* 244.9 Hypothyroidism Unspecified

* 272.4 Hyperlipidemia Oth Unspec

* 715.09 [facility] Multiple Sites* Follow up:* In 3 months, For Complete
  Annual Exam.





* V04.81 Flu Vaccine

* All * New Medication:* Xopenex HFA 45 mcg/Act - 2 puffs q4-6h prn shortness of
  breath









Functional Status





                                        Description

 

                                        No Information Available







Mental Status





                                        Description

 

                                        No Information Available







Referrals





                                        Description

 

                                        No Information Available

## 2021-11-03 NOTE — REP
INDICATION:

rhochi lungs.



COMPARISON:

Chest x-ray 09/22/2020, low-dose CT 07/15/2021



TECHNIQUE:

Two views



FINDINGS:

Lungs are well inflated without pleural effusion or definite infiltrate.  Some minor

apical pleural scarring suggested.  Few cuffed bronchi in the perihilar regions may

reflect bronchitis or reactive airway disease I see no dense consolidation, pleural

effusion or parenchymal mass.  Heart not enlarged.  No vascular redistribution or

pulmonary edema.  The aorta and airway are intact.  Bony thorax shows no acute

compression deformity but with marginal osteophytes heaviest in the mid and lower

thoracic region.  There is no free air under the diaphragm.



IMPRESSION:

1. Perihilar changes of bronchitis or reactive airway disease without dense

consolidation, pleural effusion or other acute parenchymal finding.

2. No cardiomegaly, vascular redistribution or other acute finding.





<Electronically signed by Jama Fitzgerald > 11/03/21 [id number]

## 2021-11-04 NOTE — ECGEPIP
[facility] - ED

                                       

                                       Test Date:    2021

Pat Name:     BISI HOLM          Department:   

Patient ID:   [id number]                 Room:         -

Gender:       Male                     Technician:   MABEL

:          1950               Requested By: FABRICIO SCHERER PA-C

Order Number: [id number]     Reading MD:   Magali Arias

                                 Measurements

Intervals                              Axis          

Rate:         68                       P:            69

MT:           158                      QRS:          15

QRSD:         88                       T:            43

QT:           400                                    

QTc:          425                                    

                           Interpretive Statements

Sinus rhythm with premature atrial complexes

NSTTW abnormalities

low voltage limb

decreased rate 1/3/18

Electronically Signed on 2021 9:43:07 EDT by Magali Arias

## 2021-11-12 ENCOUNTER — HOSPITAL ENCOUNTER (OUTPATIENT)
Dept: HOSPITAL 53 - M WUC | Age: 71
End: 2021-11-12
Attending: PHYSICIAN ASSISTANT
Payer: MEDICARE

## 2021-11-12 DIAGNOSIS — N17.9: Primary | ICD-10-CM

## 2021-11-12 DIAGNOSIS — K52.9: ICD-10-CM

## 2021-11-12 LAB
ALBUMIN SERPL BCG-MCNC: 3.1 GM/DL (ref 3.2–5.2)
ALT SERPL W P-5'-P-CCNC: 16 U/L (ref 12–78)
BILIRUB SERPL-MCNC: 0.3 MG/DL (ref 0.2–1)
BUN SERPL-MCNC: 36 MG/DL (ref 7–18)
CALCIUM SERPL-MCNC: 8.7 MG/DL (ref 8.8–10.2)
CHLORIDE SERPL-SCNC: 108 MEQ/L (ref 98–107)
CO2 SERPL-SCNC: 23 MEQ/L (ref 21–32)
CREAT SERPL-MCNC: 1.17 MG/DL (ref 0.7–1.3)
GFR SERPL CREATININE-BSD FRML MDRD: > 60 ML/MIN/{1.73_M2} (ref 42–?)
GLUCOSE SERPL-MCNC: 102 MG/DL (ref 70–100)
HCT VFR BLD AUTO: 42.7 % (ref 42–52)
HGB BLD-MCNC: 14.7 G/DL (ref 13.5–17.5)
MCH RBC QN AUTO: 30.5 PG (ref 27–33)
MCHC RBC AUTO-ENTMCNC: 34.4 G/DL (ref 32–36.5)
MCV RBC AUTO: 88.6 FL (ref 80–96)
PLATELET # BLD AUTO: 135 10^3/UL (ref 150–450)
POTASSIUM SERPL-SCNC: 3.9 MEQ/L (ref 3.5–5.1)
PROT SERPL-MCNC: 6.2 GM/DL (ref 6.4–8.2)
RBC # BLD AUTO: 4.82 10^6/UL (ref 4.3–6.1)
SODIUM SERPL-SCNC: 139 MEQ/L (ref 136–145)
WBC # BLD AUTO: 13.8 10^3/UL (ref 4–10)

## 2023-01-09 ENCOUNTER — HOSPITAL ENCOUNTER (OUTPATIENT)
Dept: HOSPITAL 53 - M WUC | Age: 73
End: 2023-01-09
Attending: PHYSICIAN ASSISTANT
Payer: MEDICARE

## 2023-01-09 DIAGNOSIS — E11.9: ICD-10-CM

## 2023-01-09 DIAGNOSIS — E78.5: Primary | ICD-10-CM

## 2023-01-09 LAB
ALBUMIN SERPL BCG-MCNC: 4 G/DL (ref 3.2–5.2)
ALP SERPL-CCNC: 83 U/L (ref 46–116)
ALT SERPL W P-5'-P-CCNC: 15 U/L (ref 7–40)
AST SERPL-CCNC: 13 U/L (ref ?–34)
BILIRUB SERPL-MCNC: 0.4 MG/DL (ref 0.3–1.2)
BUN SERPL-MCNC: 26 MG/DL (ref 9–23)
CALCIUM SERPL-MCNC: 9.3 MG/DL (ref 8.3–10.6)
CHLORIDE SERPL-SCNC: 108 MMOL/L (ref 98–107)
CHOLEST SERPL-MCNC: 211 MG/DL (ref ?–200)
CHOLEST/HDLC SERPL: 5.17 {RATIO} (ref ?–5)
CO2 SERPL-SCNC: 26 MMOL/L (ref 20–31)
CREAT SERPL-MCNC: 1.17 MG/DL (ref 0.7–1.3)
EST. AVERAGE GLUCOSE BLD GHB EST-MCNC: 114 MG/DL (ref 60–110)
GFR SERPL CREATININE-BSD FRML MDRD: > 60 ML/MIN/{1.73_M2} (ref 42–?)
GLUCOSE SERPL-MCNC: 107 MG/DL (ref 74–106)
HDLC SERPL-MCNC: 40.8 MG/DL (ref 40–?)
LDLC SERPL CALC-MCNC: 132.2 MG/DL (ref ?–100)
NONHDLC SERPL-MCNC: 170 MG/DL
POTASSIUM SERPL-SCNC: 4.3 MMOL/L (ref 3.5–5.1)
PROT SERPL-MCNC: 6.8 G/DL (ref 5.7–8.2)
SODIUM SERPL-SCNC: 141 MMOL/L (ref 136–145)
TRIGL SERPL-MCNC: 190 MG/DL (ref ?–150)

## 2023-04-11 ENCOUNTER — HOSPITAL ENCOUNTER (OUTPATIENT)
Dept: HOSPITAL 53 - M WUC | Age: 73
End: 2023-04-11
Attending: PHYSICIAN ASSISTANT
Payer: MEDICARE

## 2023-04-11 DIAGNOSIS — E11.9: Primary | ICD-10-CM

## 2023-04-11 LAB
CREAT UR-MCNC: 82.1 MG/DL
EST. AVERAGE GLUCOSE BLD GHB EST-MCNC: 120 MG/DL (ref 60–110)
MICROALBUMIN UR-MCNC: 4 MG/L
MICROALBUMIN/CREAT UR: 4.8 MCG/MG (ref 0–30)

## 2023-08-10 ENCOUNTER — HOSPITAL ENCOUNTER (OUTPATIENT)
Dept: HOSPITAL 53 - M WUC | Age: 73
End: 2023-08-10
Attending: PHYSICIAN ASSISTANT
Payer: MEDICARE

## 2023-08-10 DIAGNOSIS — E78.5: Primary | ICD-10-CM

## 2023-08-10 DIAGNOSIS — E11.9: ICD-10-CM

## 2023-08-10 DIAGNOSIS — E03.9: ICD-10-CM

## 2023-08-10 DIAGNOSIS — Z12.5: ICD-10-CM

## 2023-08-10 LAB
ALBUMIN SERPL BCG-MCNC: 4.1 G/DL (ref 3.2–5.2)
ALP SERPL-CCNC: 91 U/L (ref 46–116)
ALT SERPL W P-5'-P-CCNC: 14 U/L (ref 7–40)
AST SERPL-CCNC: 11 U/L (ref ?–34)
BILIRUB SERPL-MCNC: 0.7 MG/DL (ref 0.3–1.2)
BUN SERPL-MCNC: 21 MG/DL (ref 9–23)
CALCIUM SERPL-MCNC: 9.4 MG/DL (ref 8.3–10.6)
CHLORIDE SERPL-SCNC: 105 MMOL/L (ref 98–107)
CHOLEST SERPL-MCNC: 191 MG/DL (ref ?–200)
CHOLEST/HDLC SERPL: 3.48 {RATIO} (ref ?–5)
CO2 SERPL-SCNC: 26 MMOL/L (ref 20–31)
CREAT SERPL-MCNC: 1.3 MG/DL (ref 0.7–1.3)
EST. AVERAGE GLUCOSE BLD GHB EST-MCNC: 128 MG/DL (ref 60–110)
GFR SERPL CREATININE-BSD FRML MDRD: 57.6 ML/MIN/{1.73_M2} (ref 42–?)
GLUCOSE SERPL-MCNC: 110 MG/DL (ref 74–106)
HCT VFR BLD AUTO: 44.7 % (ref 42–52)
HDLC SERPL-MCNC: 54.8 MG/DL (ref 40–?)
HGB BLD-MCNC: 14.4 G/DL (ref 13.5–17.5)
LDLC SERPL CALC-MCNC: 118 MG/DL (ref ?–100)
MCH RBC QN AUTO: 30.9 PG (ref 27–33)
MCHC RBC AUTO-ENTMCNC: 32.2 G/DL (ref 32–36.5)
MCV RBC AUTO: 95.9 FL (ref 80–96)
NONHDLC SERPL-MCNC: 136.2 MG/DL
PLATELET # BLD AUTO: 191 10^3/UL (ref 150–450)
POTASSIUM SERPL-SCNC: 4.4 MMOL/L (ref 3.5–5.1)
PROT SERPL-MCNC: 7 G/DL (ref 5.7–8.2)
PSA SERPL-MCNC: 0.88 NG/ML (ref ?–4)
RBC # BLD AUTO: 4.66 10^6/UL (ref 4.3–6.1)
SODIUM SERPL-SCNC: 139 MMOL/L (ref 136–145)
TRIGL SERPL-MCNC: 91 MG/DL (ref ?–150)
TSH SERPL DL<=0.005 MIU/L-ACNC: 1.25 UIU/ML (ref 0.55–4.78)
WBC # BLD AUTO: 8.1 10^3/UL (ref 4–10)

## 2023-10-17 ENCOUNTER — HOSPITAL ENCOUNTER (OUTPATIENT)
Dept: HOSPITAL 53 - M RAD | Age: 73
End: 2023-10-17
Attending: NURSE PRACTITIONER
Payer: MEDICARE

## 2023-10-17 DIAGNOSIS — N40.1: ICD-10-CM

## 2023-10-17 DIAGNOSIS — Q61.02: ICD-10-CM

## 2023-10-17 DIAGNOSIS — N18.31: Primary | ICD-10-CM

## 2023-10-17 DIAGNOSIS — R16.1: ICD-10-CM

## 2023-11-13 ENCOUNTER — HOSPITAL ENCOUNTER (OUTPATIENT)
Dept: HOSPITAL 53 - M LABWUC | Age: 73
End: 2023-11-13
Attending: PHYSICIAN ASSISTANT
Payer: MEDICARE

## 2023-11-13 DIAGNOSIS — E11.9: Primary | ICD-10-CM

## 2023-11-13 LAB — EST. AVERAGE GLUCOSE BLD GHB EST-MCNC: 117 MG/DL (ref 60–110)

## 2024-02-13 ENCOUNTER — HOSPITAL ENCOUNTER (OUTPATIENT)
Dept: HOSPITAL 53 - M LABWUC | Age: 74
End: 2024-02-13
Attending: PHYSICIAN ASSISTANT
Payer: MEDICARE

## 2024-02-13 DIAGNOSIS — E78.5: ICD-10-CM

## 2024-02-13 DIAGNOSIS — E11.9: Primary | ICD-10-CM

## 2024-02-13 LAB
ALBUMIN SERPL BCG-MCNC: 4.2 G/DL (ref 3.2–5.2)
ALP SERPL-CCNC: 71 U/L (ref 46–116)
ALT SERPL W P-5'-P-CCNC: 16 U/L (ref 7–40)
AST SERPL-CCNC: 9 U/L (ref ?–34)
BILIRUB SERPL-MCNC: 0.5 MG/DL (ref 0.3–1.2)
BUN SERPL-MCNC: 22 MG/DL (ref 9–23)
CALCIUM SERPL-MCNC: 8.8 MG/DL (ref 8.3–10.6)
CHLORIDE SERPL-SCNC: 109 MMOL/L (ref 98–107)
CHOLEST SERPL-MCNC: 230 MG/DL (ref ?–200)
CHOLEST/HDLC SERPL: 5.08 {RATIO} (ref ?–5)
CO2 SERPL-SCNC: 24 MMOL/L (ref 20–31)
CREAT SERPL-MCNC: 1.08 MG/DL (ref 0.7–1.3)
EST. AVERAGE GLUCOSE BLD GHB EST-MCNC: 123 MG/DL (ref 60–110)
GFR SERPL CREATININE-BSD FRML MDRD: > 60 ML/MIN/{1.73_M2} (ref 42–?)
GLUCOSE SERPL-MCNC: 118 MG/DL (ref 74–106)
HCT VFR BLD AUTO: 42 % (ref 42–52)
HDLC SERPL-MCNC: 45.2 MG/DL (ref 40–?)
HGB BLD-MCNC: 13.9 G/DL (ref 13.5–17.5)
LDLC SERPL CALC-MCNC: 144.8 MG/DL (ref ?–100)
MCH RBC QN AUTO: 30.6 PG (ref 27–33)
MCHC RBC AUTO-ENTMCNC: 33.1 G/DL (ref 32–36.5)
MCV RBC AUTO: 92.5 FL (ref 80–96)
NONHDLC SERPL-MCNC: 184.8 MG/DL
PLATELET # BLD AUTO: 172 10^3/UL (ref 150–450)
POTASSIUM SERPL-SCNC: 4.1 MMOL/L (ref 3.5–5.1)
PROT SERPL-MCNC: 6.8 G/DL (ref 5.7–8.2)
RBC # BLD AUTO: 4.54 10^6/UL (ref 4.3–6.1)
SODIUM SERPL-SCNC: 139 MMOL/L (ref 136–145)
TRIGL SERPL-MCNC: 200 MG/DL (ref ?–150)
WBC # BLD AUTO: 5.9 10^3/UL (ref 4–10)

## 2024-05-13 ENCOUNTER — HOSPITAL ENCOUNTER (OUTPATIENT)
Dept: HOSPITAL 53 - M WUC | Age: 74
End: 2024-05-13
Attending: PHYSICIAN ASSISTANT
Payer: MEDICARE

## 2024-05-13 DIAGNOSIS — R53.83: ICD-10-CM

## 2024-05-13 DIAGNOSIS — E11.9: ICD-10-CM

## 2024-05-13 DIAGNOSIS — Z12.5: ICD-10-CM

## 2024-05-13 DIAGNOSIS — F52.21: ICD-10-CM

## 2024-05-13 DIAGNOSIS — E78.5: Primary | ICD-10-CM

## 2024-05-13 LAB
ALBUMIN SERPL BCG-MCNC: 4 G/DL (ref 3.2–5.2)
ALP SERPL-CCNC: 84 U/L (ref 46–116)
ALT SERPL W P-5'-P-CCNC: 18 U/L (ref 7–40)
AST SERPL-CCNC: 14 U/L (ref ?–34)
BILIRUB SERPL-MCNC: 0.4 MG/DL (ref 0.3–1.2)
BUN SERPL-MCNC: 25 MG/DL (ref 9–23)
CALCIUM SERPL-MCNC: 9.2 MG/DL (ref 8.3–10.6)
CHLORIDE SERPL-SCNC: 106 MMOL/L (ref 98–107)
CHOLEST SERPL-MCNC: 234 MG/DL (ref ?–200)
CHOLEST/HDLC SERPL: 6 {RATIO} (ref ?–5)
CO2 SERPL-SCNC: 27 MMOL/L (ref 20–31)
CREAT SERPL-MCNC: 1.15 MG/DL (ref 0.7–1.3)
EST. AVERAGE GLUCOSE BLD GHB EST-MCNC: 134 MG/DL (ref 60–110)
GFR SERPL CREATININE-BSD FRML MDRD: > 60 ML/MIN/{1.73_M2} (ref 42–?)
GLUCOSE SERPL-MCNC: 127 MG/DL (ref 74–106)
HCT VFR BLD AUTO: 42 % (ref 42–52)
HDLC SERPL-MCNC: 39 MG/DL (ref 40–?)
HGB BLD-MCNC: 14.1 G/DL (ref 13.5–17.5)
MCH RBC QN AUTO: 31.2 PG (ref 27–33)
MCHC RBC AUTO-ENTMCNC: 33.6 G/DL (ref 32–36.5)
MCV RBC AUTO: 92.9 FL (ref 80–96)
NONHDLC SERPL-MCNC: 195 MG/DL
PLATELET # BLD AUTO: 159 10^3/UL (ref 150–450)
POTASSIUM SERPL-SCNC: 4.5 MMOL/L (ref 3.5–5.1)
PROT SERPL-MCNC: 7.1 G/DL (ref 5.7–8.2)
PSA SERPL-MCNC: 0.84 NG/ML (ref ?–4)
RBC # BLD AUTO: 4.52 10^6/UL (ref 4.3–6.1)
SODIUM SERPL-SCNC: 139 MMOL/L (ref 136–145)
TRIGL SERPL-MCNC: 429 MG/DL (ref ?–150)

## 2024-05-14 LAB — WBC # BLD AUTO: 6.5 10^3/UL (ref 4–10)

## 2024-05-17 LAB
TESTOST SERPL-MCNC: 223 NG/DL (ref 264–916)
TESTOSTERONE.FREE+WB MFR SERPL: 14.2 % (ref 9–46)
TESTOSTERONE.FREE+WB SERPL-MCNC: 31.7 NG/DL (ref 40–250)

## 2024-09-26 ENCOUNTER — HOSPITAL ENCOUNTER (OUTPATIENT)
Dept: HOSPITAL 53 - M LAB REF | Age: 74
End: 2024-09-26
Attending: NURSE PRACTITIONER
Payer: MEDICARE

## 2024-09-26 DIAGNOSIS — D64.9: ICD-10-CM

## 2024-09-26 DIAGNOSIS — D50.9: Primary | ICD-10-CM

## 2024-09-26 LAB
FERRITIN SERPL-MCNC: 73.4 NG/ML (ref 10.5–307.3)
FOLATE SERPL-MCNC: 8.1 NG/ML (ref 5.4–?)
IRON SATN MFR SERPL: 29.1 % (ref 19.7–50)
IRON SERPL-MCNC: 102 UG/DL (ref 65–175)
TIBC SERPL-MCNC: 351 UG/DL (ref 250–425)
VIT B12 SERPL-MCNC: 618 PG/ML (ref 211–911)

## 2024-11-19 ENCOUNTER — HOSPITAL ENCOUNTER (OUTPATIENT)
Dept: HOSPITAL 53 - M WUC | Age: 74
End: 2024-11-19
Attending: PHYSICIAN ASSISTANT
Payer: MEDICARE

## 2024-11-19 DIAGNOSIS — E11.9: Primary | ICD-10-CM

## 2024-11-19 LAB — EST. AVERAGE GLUCOSE BLD GHB EST-MCNC: 137 MG/DL (ref 60–110)

## 2025-02-24 ENCOUNTER — HOSPITAL ENCOUNTER (OUTPATIENT)
Dept: HOSPITAL 53 - M WUC | Age: 75
End: 2025-02-24
Attending: PHYSICIAN ASSISTANT
Payer: MEDICARE

## 2025-02-24 DIAGNOSIS — E03.9: ICD-10-CM

## 2025-02-24 DIAGNOSIS — E11.9: Primary | ICD-10-CM

## 2025-02-24 DIAGNOSIS — E78.5: ICD-10-CM

## 2025-02-24 LAB
25(OH)D3 SERPL-MCNC: 34 NG/ML (ref 20–100)
ALBUMIN SERPL BCG-MCNC: 3.8 G/DL (ref 3.2–5.2)
ALP SERPL-CCNC: 64 U/L (ref 40–129)
ALT SERPL W P-5'-P-CCNC: 18 U/L (ref 7–40)
AST SERPL-CCNC: 11 U/L (ref ?–34)
BILIRUB SERPL-MCNC: 0.6 MG/DL (ref 0.3–1.2)
BUN SERPL-MCNC: 22 MG/DL (ref 9–23)
CALCIUM SERPL-MCNC: 9 MG/DL (ref 8.3–10.6)
CHLORIDE SERPL-SCNC: 110 MMOL/L (ref 98–107)
CHOLEST SERPL-MCNC: 169 MG/DL (ref ?–200)
CHOLEST/HDLC SERPL: 3.63 {RATIO} (ref ?–5)
CO2 SERPL-SCNC: 25 MMOL/L (ref 20–31)
CREAT SERPL-MCNC: 1.17 MG/DL (ref 0.7–1.3)
EST. AVERAGE GLUCOSE BLD GHB EST-MCNC: 157 MG/DL (ref 60–110)
GFR SERPL CREATININE-BSD FRML MDRD: > 60 ML/MIN/{1.73_M2} (ref 42–?)
GLUCOSE SERPL-MCNC: 186 MG/DL (ref 74–106)
HCT VFR BLD AUTO: 38.9 % (ref 42–52)
HDLC SERPL-MCNC: 46.5 MG/DL (ref 40–?)
HGB BLD-MCNC: 12.8 G/DL (ref 13.5–17.5)
LDLC SERPL CALC-MCNC: 78.1 MG/DL (ref ?–100)
MCH RBC QN AUTO: 30.3 PG (ref 27–33)
MCHC RBC AUTO-ENTMCNC: 32.9 G/DL (ref 32–36.5)
MCV RBC AUTO: 92 FL (ref 80–96)
NONHDLC SERPL-MCNC: 122.5 MG/DL
PLATELET # BLD AUTO: 155 10^3/UL (ref 150–450)
POTASSIUM SERPL-SCNC: 4.7 MMOL/L (ref 3.5–5.1)
PROT SERPL-MCNC: 6.9 G/DL (ref 5.7–8.2)
RBC # BLD AUTO: 4.23 10^6/UL (ref 4.3–6.1)
SODIUM SERPL-SCNC: 142 MMOL/L (ref 136–145)
TRIGL SERPL-MCNC: 222 MG/DL (ref ?–150)
TSH SERPL DL<=0.005 MIU/L-ACNC: 3.41 UIU/ML (ref 0.55–4.78)

## 2025-02-25 LAB — WBC # BLD AUTO: 6 10^3/UL (ref 4–10)

## 2025-04-03 ENCOUNTER — HOSPITAL ENCOUNTER (OUTPATIENT)
Dept: HOSPITAL 53 - M RAD | Age: 75
End: 2025-04-03
Attending: NURSE PRACTITIONER
Payer: MEDICARE

## 2025-04-03 DIAGNOSIS — N26.1: ICD-10-CM

## 2025-04-03 DIAGNOSIS — N28.1: ICD-10-CM

## 2025-04-03 DIAGNOSIS — N18.31: Primary | ICD-10-CM

## 2025-04-03 DIAGNOSIS — N40.1: ICD-10-CM

## 2025-04-18 ENCOUNTER — HOSPITAL ENCOUNTER (OUTPATIENT)
Dept: HOSPITAL 53 - M SMT | Age: 75
End: 2025-04-18
Attending: UROLOGY
Payer: MEDICARE

## 2025-04-18 DIAGNOSIS — R35.0: Primary | ICD-10-CM

## 2025-04-18 DIAGNOSIS — Z12.5: ICD-10-CM

## 2025-04-18 LAB
APPEARANCE UR: CLEAR
BACTERIA UR QL AUTO: NEGATIVE
BILIRUB UR QL STRIP.AUTO: NEGATIVE
GLUCOSE UR QL STRIP.AUTO: (no result) MG/DL
HGB UR QL STRIP.AUTO: NEGATIVE
KETONES UR QL STRIP.AUTO: NEGATIVE MG/DL
LEUKOCYTE ESTERASE UR QL STRIP.AUTO: NEGATIVE
NITRITE UR QL STRIP.AUTO: NEGATIVE
PROT UR QL STRIP.AUTO: NEGATIVE MG/DL
RBC # UR AUTO: 2 /HPF (ref 0–3)
SP GR UR STRIP.AUTO: 1.02 (ref 1–1.03)
SQUAMOUS #/AREA URNS AUTO: 0 /HPF (ref 0–6)
UROBILINOGEN UR QL STRIP.AUTO: 0.2 MG/DL (ref 0–2)
WBC #/AREA URNS AUTO: 1 /HPF (ref 0–3)

## 2025-04-18 PROCEDURE — 81001 URINALYSIS AUTO W/SCOPE: CPT

## 2025-04-18 PROCEDURE — 87086 URINE CULTURE/COLONY COUNT: CPT
